# Patient Record
Sex: FEMALE | Race: WHITE | NOT HISPANIC OR LATINO | Employment: PART TIME | URBAN - METROPOLITAN AREA
[De-identification: names, ages, dates, MRNs, and addresses within clinical notes are randomized per-mention and may not be internally consistent; named-entity substitution may affect disease eponyms.]

---

## 2017-02-01 ENCOUNTER — TELEPHONE (OUTPATIENT)
Dept: INTERNAL MEDICINE | Facility: MEDICAL CENTER | Age: 62
End: 2017-02-01

## 2017-02-01 NOTE — TELEPHONE ENCOUNTER
Refilled prozac. Patient has elavil listed in her med list (from external pharmacy). I called patient and left voicemail to make sure she is not taking both prozac and elavil together.   Can you also please call and make sure she not taking both meds together. Thanks.

## 2017-02-07 RX ORDER — OMEGA-3/DHA/EPA/FISH OIL 35-113.5MG
TABLET,CHEWABLE ORAL
Qty: 60 TAB | Refills: 0 | Status: SHIPPED | OUTPATIENT
Start: 2017-02-07 | End: 2017-03-07

## 2017-02-07 NOTE — TELEPHONE ENCOUNTER
Last seen: 11/29/16 by Dr. Black  Next appt: None     Was the patient seen in the last year in this department? Yes   Does patient have an active prescription for medications requested? No   Received Request Via: Pharmacy

## 2017-03-07 ENCOUNTER — OFFICE VISIT (OUTPATIENT)
Dept: INTERNAL MEDICINE | Facility: MEDICAL CENTER | Age: 62
End: 2017-03-07
Payer: MEDICAID

## 2017-03-07 VITALS
HEIGHT: 64 IN | BODY MASS INDEX: 24.59 KG/M2 | HEART RATE: 58 BPM | WEIGHT: 144 LBS | RESPIRATION RATE: 16 BRPM | OXYGEN SATURATION: 99 % | SYSTOLIC BLOOD PRESSURE: 128 MMHG | DIASTOLIC BLOOD PRESSURE: 80 MMHG | TEMPERATURE: 97.9 F

## 2017-03-07 DIAGNOSIS — Z79.899 MEDICATION MANAGEMENT: ICD-10-CM

## 2017-03-07 DIAGNOSIS — E11.8 UNCONTROLLED TYPE 2 DIABETES MELLITUS WITH COMPLICATION, UNSPECIFIED LONG TERM INSULIN USE STATUS: ICD-10-CM

## 2017-03-07 DIAGNOSIS — E11.65 UNCONTROLLED TYPE 2 DIABETES MELLITUS WITH COMPLICATION, UNSPECIFIED LONG TERM INSULIN USE STATUS: ICD-10-CM

## 2017-03-07 DIAGNOSIS — Z72.0 TOBACCO USE: ICD-10-CM

## 2017-03-07 DIAGNOSIS — F41.9 ANXIETY: ICD-10-CM

## 2017-03-07 PROCEDURE — 99214 OFFICE O/P EST MOD 30 MIN: CPT | Mod: GC | Performed by: INTERNAL MEDICINE

## 2017-03-07 NOTE — PATIENT INSTRUCTIONS
Diabetes and Exercise  Exercising regularly is important. It is not just about losing weight. It has many health benefits, such as:  · Improving your overall fitness, flexibility, and endurance.  · Increasing your bone density.  · Helping with weight control.  · Decreasing your body fat.  · Increasing your muscle strength.  · Reducing stress and tension.  · Improving your overall health.  People with diabetes who exercise gain additional benefits because exercise:  · Reduces appetite.  · Improves the body's use of blood sugar (glucose).  · Helps lower or control blood glucose.  · Decreases blood pressure.  · Helps control blood lipids (such as cholesterol and triglycerides).  · Improves the body's use of the hormone insulin by:  · Increasing the body's insulin sensitivity.  · Reducing the body's insulin needs.  · Decreases the risk for heart disease because exercising:  · Lowers cholesterol and triglycerides levels.  · Increases the levels of good cholesterol (such as high-density lipoproteins [HDL]) in the body.  · Lowers blood glucose levels.  YOUR ACTIVITY PLAN   Choose an activity that you enjoy, and set realistic goals. To exercise safely, you should begin practicing any new physical activity slowly, and gradually increase the intensity of the exercise over time. Your health care provider or diabetes educator can help create an activity plan that works for you. General recommendations include:  · Encouraging children to engage in at least 60 minutes of physical activity each day.  · Stretching and performing strength training exercises, such as yoga or weight lifting, at least 2 times per week.  · Performing a total of at least 150 minutes of moderate-intensity exercise each week, such as brisk walking or water aerobics.  · Exercising at least 3 days per week, making sure you allow no more than 2 consecutive days to pass without exercising.  · Avoiding long periods of inactivity (90 minutes or more). When you  have to spend an extended period of time sitting down, take frequent breaks to walk or stretch.  RECOMMENDATIONS FOR EXERCISING WITH TYPE 1 OR TYPE 2 DIABETES   · Check your blood glucose before exercising. If blood glucose levels are greater than 240 mg/dL, check for urine ketones. Do not exercise if ketones are present.  · Avoid injecting insulin into areas of the body that are going to be exercised. For example, avoid injecting insulin into:  ¨ The arms when playing tennis.  ¨ The legs when jogging.  · Keep a record of:  ¨ Food intake before and after you exercise.  ¨ Expected peak times of insulin action.  ¨ Blood glucose levels before and after you exercise.  ¨ The type and amount of exercise you have done.  · Review your records with your health care provider. Your health care provider will help you to develop guidelines for adjusting food intake and insulin amounts before and after exercising.  · If you take insulin or oral hypoglycemic agents, watch for signs and symptoms of hypoglycemia. They include:  ¨ Dizziness.  ¨ Shaking.  ¨ Sweating.  ¨ Chills.  ¨ Confusion.  · Drink plenty of water while you exercise to prevent dehydration or heat stroke. Body water is lost during exercise and must be replaced.  · Talk to your health care provider before starting an exercise program to make sure it is safe for you. Remember, almost any type of activity is better than none.     This information is not intended to replace advice given to you by your health care provider. Make sure you discuss any questions you have with your health care provider.     Document Released: 03/09/2005 Document Revised: 05/03/2016 Document Reviewed: 05/27/2014  [employer] Interactive Patient Education ©2016 [employer]  Hypoglycemia  Hypoglycemia occurs when the glucose in your blood is too low. Glucose is a type of sugar that is your body's main energy source. Hormones, such as insulin and glucagon, control the level of glucose in the  blood. Insulin lowers blood glucose and glucagon increases blood glucose. Having too much insulin in your blood stream, or not eating enough food containing sugar, can result in hypoglycemia. Hypoglycemia can happen to people with or without diabetes. It can develop quickly and can be a medical emergency.   CAUSES   · Missing or delaying meals.  · Not eating enough carbohydrates at meals.  · Taking too much diabetes medicine.  · Not timing your oral diabetes medicine or insulin doses with meals, snacks, and exercise.  · Nausea and vomiting.  · Certain medicines.  · Severe illnesses, such as hepatitis, kidney disorders, and certain eating disorders.  · Increased activity or exercise without eating something extra or adjusting medicines.  · Drinking too much alcohol.  · A nerve disorder that affects body functions like your heart rate, blood pressure, and digestion (autonomic neuropathy).  · A condition where the stomach muscles do not function properly (gastroparesis). Therefore, medicines and food may not absorb properly.  · Rarely, a tumor of the pancreas can produce too much insulin.  SYMPTOMS   · Hunger.  · Sweating (diaphoresis).  · Change in body temperature.  · Shakiness.  · Headache.  · Anxiety.  · Lightheadedness.  · Irritability.  · Difficulty concentrating.  · Dry mouth.  · Tingling or numbness in the hands or feet.  · Restless sleep or sleep disturbances.  · Altered speech and coordination.  · Change in mental status.  · Seizures or prolonged convulsions.  · Combativeness.  · Drowsiness (lethargic).  · Weakness.  · Increased heart rate or palpitations.  · Confusion.  · Pale, gray skin color.  · Blurred or double vision.  · Fainting.  DIAGNOSIS   A physical exam and medical history will be performed. Your caregiver may make a diagnosis based on your symptoms. Blood tests and other lab tests may be performed to confirm a diagnosis. Once the diagnosis is made, your caregiver will see if your signs and  symptoms go away once your blood glucose is raised.   TREATMENT   Usually, you can easily treat your hypoglycemia when you notice symptoms.  · Check your blood glucose. If it is less than 70 mg/dl, take one of the following:    ¨ 3-4 glucose tablets.    ¨ ½ cup juice.    ¨ ½ cup regular soda.    ¨ 1 cup skim milk.    ¨ ½-1 tube of glucose gel.    ¨ 5-6 hard candies.    · Avoid high-fat drinks or food that may delay a rise in blood glucose levels.  · Do not take more than the recommended amount of sugary foods, drinks, gel, or tablets. Doing so will cause your blood glucose to go too high.    · Wait 10-15 minutes and recheck your blood glucose. If it is still less than 70 mg/dl or below your target range, repeat treatment.    · Eat a snack if it is more than 1 hour until your next meal.    There may be a time when your blood glucose may go so low that you are unable to treat yourself at home when you start to notice symptoms. You may need someone to help you. You may even faint or be unable to swallow. If you cannot treat yourself, someone will need to bring you to the hospital.   HOME CARE INSTRUCTIONS  · If you have diabetes, follow your diabetes management plan by:  ¨ Taking your medicines as directed.  ¨ Following your exercise plan.  ¨ Following your meal plan. Do not skip meals. Eat on time.  ¨ Testing your blood glucose regularly. Check your blood glucose before and after exercise. If you exercise longer or different than usual, be sure to check blood glucose more frequently.  ¨ Wearing your medical alert jewelry that says you have diabetes.  · Identify the cause of your hypoglycemia. Then, develop ways to prevent the recurrence of hypoglycemia.  · Do not take a hot bath or shower right after an insulin shot.  · Always carry treatment with you. Glucose tablets are the easiest to carry.  · If you are going to drink alcohol, drink it only with meals.  · Tell friends or family members ways to keep you safe during  a seizure. This may include removing hard or sharp objects from the area or turning you on your side.  · Maintain a healthy weight.  SEEK MEDICAL CARE IF:   · You are having problems keeping your blood glucose in your target range.  · You are having frequent episodes of hypoglycemia.  · You feel you might be having side effects from your medicines.  · You are not sure why your blood glucose is dropping so low.  · You notice a change in vision or a new problem with your vision.  SEEK IMMEDIATE MEDICAL CARE IF:   · Confusion develops.  · A change in mental status occurs.  · The inability to swallow develops.  · Fainting occurs.     This information is not intended to replace advice given to you by your health care provider. Make sure you discuss any questions you have with your health care provider.     Document Released: 12/18/2006 Document Revised: 12/23/2014 Document Reviewed: 04/15/2013  [employer] Interactive Patient Education ©2016 [employer]

## 2017-03-07 NOTE — PROGRESS NOTES
Established Patient    Mary presents today with the following:    CC: Follow-up    HPI: 61-year-old female here for regular follow-up.    Diabetes mellitus type 2: She is currently on metformin 1000 twice a day, Lantus 20 in the evening and glipizide 5 mg once a day. Her A1c in September was 7. She did not get repeat A1c. Also did not get her microalbumin to creatinine ratio. Patient is still having episodes of dizziness and confusion. She is not checking her blood sugars. She states that the meter and the strips are not working together and her insurance is not going to cover for another glucometer.    Tobacco abuse: She is down to 5 cigarettes now from 10 cigarettes.     Depression/anxiety: Patient is taking only Elavil. She confirms that she is not taking Prozac in addition.    Has maintenance: States she has an upcoming appointment for mammogram as well as colonoscopy which is scheduled on 28 March.      Patient Active Problem List    Diagnosis Date Noted   • Screening mammogram, encounter for 11/29/2016   • Callus of right foot 11/29/2016   • Essential hypertension, benign 11/14/2016   • Chest discomfort 11/14/2016   • Coronary artery disease, non-occlusive 11/14/2016   • Unstable angina (CMS-HCC) 08/18/2016   • Tobacco use 08/18/2016   • Near syncope 08/18/2016   • History of lupus 08/18/2016   • Vitamin B12 deficiency 08/15/2016   • Multiple pulmonary nodules 06/23/2016   • Fibromyalgia 06/23/2016   • GERD (gastroesophageal reflux disease) 06/23/2016   • Dyslipidemia 06/23/2016   • Insomnia 06/23/2016   • Anxiety 06/23/2016   • Vitamin D deficiency 06/23/2016   • Diabetes type 2, uncontrolled (CMS-[facility]) 09/21/2010       Current Outpatient Prescriptions   Medication Sig Dispense Refill   • [facility] VITAMIN B12 1000 MCG Tab TAKE 2 TABLETS BY MOUTH EVERY DAY. 60 Tab 0   • fluoxetine (PROZAC) 10 MG Cap TAKE 1 CAPSULE BY MOUTH DAILY 30 Cap 3   • atorvastatin (LIPITOR) 40 MG Tab Take 1 Tab by mouth every day. 30  "Tab 5   • cyanocobalamin (VITAMIN B12) 1000 MCG Tab Take 2 Tabs by mouth every day. 30 Tab 6   • trazodone (DESYREL) 50 MG Tab Take 1 Tab by mouth every bedtime. TAKE 1 TABLET BY MOUTH DAILY 30 Tab 3   • metoprolol (LOPRESSOR) 25 MG Tab Take 0.5 Tabs by mouth 2 times a day. 120 Tab 3   • glipiZIDE (GLUCOTROL) 5 MG Tab TAKE 1 TABLET BY MOUTH 2 TIMES A DAY. 60 Tab 5   • Blood Glucose Monitoring Suppl (TRUE METRIX METER) W/DEVICE Kit 1 Each by Does not apply route 2 times a day. True Metrix 1 Kit 10   • glucose blood strip 1 Strip by Other route 2 Times a Day. True Metrix brand 100 Strip 6   • cyanocobalamin (VITAMIN B-12) 1000 MCG/ML Solution 1 mL by Intramuscular route every 30 days. 1 Vial 2   • metformin (GLUCOPHAGE) 1000 MG tablet Take 1 Tab by mouth 2 times a day, with meals. 60 Tab 6   • gabapentin (NEURONTIN) 100 MG Cap Take 1 Cap by mouth 3 times a day. 90 Cap 3   • amitriptyline (ELAVIL) 25 MG Tab Take 25 mg by mouth.  1   • Lancets Misc Lancets order: Lancets for glucometer. Sig: use BID and prn ssx high or low sugar. #100 RF x 3 100 Each 6   • lisinopril (PRINIVIL) 20 MG Tab Take 0.5 Tabs by mouth every day. 30 Tab 6   • insulin glargine (LANTUS) 100 UNIT/ML Solution Pen-injector injection Inject 20 Units as instructed every evening. 3 PEN 6   • glipiZIDE (GLUCOTROL) 5 MG Tab Take 1 Tab by mouth 2 times a day. 60 Tab 3   • Blood Glucose Monitoring Suppl (TRUERESULT BLOOD GLUCOSE) W/DEVICE Kit Use as directed 1 Kit 0   • Insulin Pen Needle 31G X 8 MM Misc Use as directed 100 Each 6   • pantoprazole (PROTONIX) 40 MG Tablet Delayed Response Take 40 mg by mouth every day.     • Lancet Devices (TRUEDRAW LANCING DEVICE) Misc by Does not apply route.     • aspirin 81 MG tablet Take 81 mg by mouth every morning. Starting 9/26        No current facility-administered medications for this visit.       ROS: As per HPI.     /80 mmHg  Pulse 58  Temp(Src) 36.6 °C (97.9 °F)  Resp 16  Ht 1.626 m (5' 4.02\")  Wt " 65.318 kg (144 lb)  BMI 24.71 kg/m2  SpO2 99%    Physical Exam   Constitutional:  oriented to person, place, and time. No distress.   Eyes: Pupils are equal, round, and reactive to light. No scleral icterus.  Neck: Neck supple. No thyromegaly present.   Cardiovascular: Normal rate, regular rhythm and normal heart sounds.  Exam reveals no gallop and no friction rub.  No murmur heard.  Pulmonary/Chest: Breath sounds normal. Chest wall is not dull to percussion.   Musculoskeletal:   no edema.   Lymphadenopathy: no cervical adenopathy  Neurological: alert and oriented to person, place, and time.   Skin: No cyanosis. Nails show no clubbing.      Assessment and Plan    1. Uncontrolled type 2 diabetes mellitus with complication, unspecified long term insulin use status (CMS-[facility])  Currently on metformin thousand twice a day, Lantus 20 at bedtime, glipizide 5 mg.  A1c 6.6. As we previously thought patient is overly controlled on her diabetes and is having hypoglycemic episodes contributing to dizziness and confusion. She has previously been worked up for chest pain/dizziness and has had Holter monitoring and cardiac catheterization done which was pretty much normal. She was diagnosed with B12 deficiency and has been taking supplements but is still having episodes of dizziness and confusion.  We will completely stop glipizide at this point in time and decrease metformin to 1000 daily from twice a day.  We will repeat A1c in 3 months and see how she's doing.  Reordered microalbumin to creatinine ratio as patient did not get it done last time  She does not want to see a podiatrist.    2. Tobacco use  She is down to 5 cigarettes now from 10 cigarettes. Congratulated about cutting down on smoking    3. Anxiety  Patient confirms that she is not on both Prozac and Elavil together and is only taking Elavil.      4. Health maintenance  States that she is going to have the mammogram done soon. Also stated that she has appointment  for colonoscopy on the 28th of this month. Willing to get a Pap smear next visit. Patient was asked to schedule a Pap smear visit. Instructed her to get a DTaP vaccine from her pharmacy.          Followup: Return in about 5 weeks (around 4/11/2017).      Signed by: Darwin Jasmine M.D.

## 2017-03-07 NOTE — MR AVS SNAPSHOT
"Mary Gibbsbe   3/7/2017 8:30 AM   Office Visit   MRN: [id number]    Department:  [facility] Med - Internal Med   Dept Phone:  [phone]    Description:  Female : 1955   Provider:  Darwin Jasmine M.D.           Reason for Visit     Dizziness states still not getting better      Allergies as of 3/7/2017     No Known Allergies      You were diagnosed with     Uncontrolled type 2 diabetes mellitus with complication, unspecified long term insulin use status (CMS-HCC)   [3355111]       Tobacco use   [632679]       Anxiety   [165185]         Vital Signs     Blood Pressure Pulse Temperature Respirations Height Weight    128/80 mmHg 58 36.6 °C (97.9 °F) 16 1.626 m (5' 4.02\") 65.318 kg (144 lb)    Body Mass Index Oxygen Saturation Smoking Status             24.71 kg/m2 99% Current Every Day Smoker         Basic Information     Date Of Birth Sex Race Ethnicity Preferred Language    1955 Female White Non- English      Your appointments     2017  9:30 AM   ANNUAL EXAM PREVENTATIVE with Darwin Jasmine M.D.   [facility] - Internal Medicine (--)    [address]  [address]  [facility] [id number] [phone]            May 30, 2017  2:30 PM   FOLLOW UP with Beck Man M.D.   [facility] (--)    [address], [address]  [facility] [id number] [phone]              Problem List              ICD-10-CM Priority Class Noted - Resolved    Diabetes type 2, uncontrolled (CMS-HCC) E11.65   2010 - Present    Multiple pulmonary nodules R91.8   2016 - Present    Fibromyalgia M79.7   2016 - Present    GERD (gastroesophageal reflux disease) K21.9   2016 - Present    Dyslipidemia E78.5   2016 - Present    Insomnia G47.00   2016 - Present    Anxiety F41.9   2016 - Present    Vitamin D deficiency E55.9   2016 - Present    Vitamin B12 deficiency E53.8   8/15/2016 - Present    Unstable angina " (CMS-[facility]) I20.0   8/18/2016 - Present    Tobacco use Z72.0   8/18/2016 - Present    Near syncope R55   8/18/2016 - Present    History of lupus Z87.39   8/18/2016 - Present    Essential hypertension, benign I10   11/14/2016 - Present    Chest discomfort R07.89   11/14/2016 - Present    Coronary artery disease, non-occlusive I25.10   11/14/2016 - Present    Screening mammogram, encounter for Z12.31   11/29/2016 - Present    Callus of right foot L84   11/29/2016 - Present      Health Maintenance        Date Due Completion Dates    IMM HEP B VACCINE (1 of 3 - Primary Series) 1955 ---    DIABETES MONOFILAMENT / LE EXAM 3/23/1957 ---    URINE ACR / MICROALBUMIN 9/23/1974 ---    IMM DTaP/Tdap/Td Vaccine (1 - Tdap) 9/23/1975 ---    PAP SMEAR 9/23/1977 ---    IMM ZOSTER VACCINE 9/23/2016 ---    IMM PNEUMOCOCCAL 19-64 (ADULT) MEDIUM RISK SERIES (1 of 1 - PPSV23) 1/24/2017 11/29/2016    MAMMOGRAM 2/1/2017 2/1/2016, 5/6/2013    A1C SCREENING 3/12/2017 9/12/2016, 7/2/2016, 9/21/2010, 5/8/2009    RETINAL SCREENING 6/23/2017 6/23/2016    SERUM CREATININE 8/25/2017 8/25/2016, 7/2/2016, 3/8/2016, 3/2/2016, 1/21/2016, 1/14/2016, 8/26/2013, 9/22/2010, 9/21/2010, 9/20/2010, 5/8/2009    FASTING LIPID PROFILE 11/28/2017 11/28/2016, 9/21/2010, 5/8/2009    COLONOSCOPY 2/1/2027 2/1/2017 (N/S)    Override on 2/1/2017: (N/S) (PER [facility] AND [facility] NO COLONOSCOPY)            Current Immunizations     13-VALENT PCV PREVNAR 11/29/2016    Influenza Vaccine Quad Inj (Preserved) 11/22/2016, 10/9/2014      Below and/or attached are the medications your provider expects you to take. Review all of your home medications and newly ordered medications with your provider and/or pharmacist. Follow medication instructions as directed by your provider and/or pharmacist. Please keep your medication list with you and share with your provider. Update the information when medications are discontinued, doses are changed, or new medications (including  over-the-counter products) are added; and carry medication information at all times in the event of emergency situations     Allergies:  No Known Allergies          Medications  Valid as of: March 07, 2017 -  9:07 AM    Generic Name Brand Name Tablet Size Instructions for use    Amitriptyline HCl (Tab) ELAVIL 25 MG Take 25 mg by mouth.        Aspirin (Tab) aspirin 81 MG Take 81 mg by mouth every morning. Starting 9/26         Atorvastatin Calcium (Tab) LIPITOR 40 MG Take 1 Tab by mouth every day.        Blood Glucose Monitoring Suppl (Kit) TRUERESULT BLOOD GLUCOSE W/DEVICE Use as directed        Blood Glucose Monitoring Suppl (Kit) TRUE METRIX METER W/DEVICE 1 Each by Does not apply route 2 times a day. True Metrix        Cyanocobalamin (Tab) VITAMIN B12 1000 MCG Take 2 Tabs by mouth every day.        FLUoxetine HCl (Cap) PROZAC 10 MG TAKE 1 CAPSULE BY MOUTH DAILY        Gabapentin (Cap) NEURONTIN 100 MG Take 1 Cap by mouth 3 times a day.        GlipiZIDE (Tab) GLUCOTROL 5 MG Take 1 Tab by mouth 2 times a day.        Glucose Blood (Strip) glucose blood  1 Strip by Other route 2 Times a Day. True Metrix brand        Insulin Glargine (Solution Pen-injector) LANTUS 100 UNIT/ML Inject 20 Units as instructed every evening.        Insulin Pen Needle (Misc) Insulin Pen Needle 31G X 8 MM Use as directed        Lancet Devices (Misc) TRUEDRAW LANCING DEVICE  by Does not apply route.        Lancets (Misc) Lancets  Lancets order: Lancets for glucometer. Sig: use BID and prn ssx high or low sugar. #100 RF x 3        Lisinopril (Tab) PRINIVIL 20 MG Take 0.5 Tabs by mouth every day.        MetFORMIN HCl (Tab) GLUCOPHAGE 1000 MG Take 1 Tab by mouth 2 times a day, with meals.        Metoprolol Tartrate (Tab) LOPRESSOR 25 MG Take 0.5 Tabs by mouth 2 times a day.        Pantoprazole Sodium (Tablet Delayed Response) PROTONIX 40 MG Take 40 mg by mouth every day.        TraZODone HCl (Tab) DESYREL 50 MG Take 1 Tab by mouth every bedtime.  TAKE 1 TABLET BY MOUTH DAILY        .                 Medicines prescribed today were sent to:     [facility] #[id number] - [place], [place] - [address] [facility]    [address] [place] [address] [place] [address]    Phone: [phone] Fax: [phone]    Open 24 Hours?: No      Medication refill instructions:       If your prescription bottle indicates you have medication refills left, it is not necessary to call your provider’s office. Please contact your pharmacy and they will refill your medication.    If your prescription bottle indicates you do not have any refills left, you may request refills at any time through one of the following ways: The online [employer] system (except Urgent Care), by calling your provider’s office, or by asking your pharmacy to contact your provider’s office with a refill request. Medication refills are processed only during regular business hours and may not be available until the next business day. Your provider may request additional information or to have a follow-up visit with you prior to refilling your medication.   *Please Note: Medication refills are assigned a new Rx number when refilled electronically. Your pharmacy may indicate that no refills were authorized even though a new prescription for the same medication is available at the pharmacy. Please request the medicine by name with the pharmacy before contacting your provider for a refill.        Your To Do List     Future Labs/Procedures Complete By Expires    MICROALBUMIN CREAT RATIO URINE  As directed 3/7/2018      Instructions    Diabetes and Exercise  Exercising regularly is important. It is not just about losing weight. It has many health benefits, such as:  · Improving your overall fitness, flexibility, and endurance.  · Increasing your bone density.  · Helping with weight control.  · Decreasing your body fat.  · Increasing your muscle strength.  · Reducing stress and tension.  · Improving your overall health.  People with diabetes who exercise  gain additional benefits because exercise:  · Reduces appetite.  · Improves the body's use of blood sugar (glucose).  · Helps lower or control blood glucose.  · Decreases blood pressure.  · Helps control blood lipids (such as cholesterol and triglycerides).  · Improves the body's use of the hormone insulin by:  · Increasing the body's insulin sensitivity.  · Reducing the body's insulin needs.  · Decreases the risk for heart disease because exercising:  · Lowers cholesterol and triglycerides levels.  · Increases the levels of good cholesterol (such as high-density lipoproteins [HDL]) in the body.  · Lowers blood glucose levels.  YOUR ACTIVITY PLAN   Choose an activity that you enjoy, and set realistic goals. To exercise safely, you should begin practicing any new physical activity slowly, and gradually increase the intensity of the exercise over time. Your health care provider or diabetes educator can help create an activity plan that works for you. General recommendations include:  · Encouraging children to engage in at least 60 minutes of physical activity each day.  · Stretching and performing strength training exercises, such as yoga or weight lifting, at least 2 times per week.  · Performing a total of at least 150 minutes of moderate-intensity exercise each week, such as brisk walking or water aerobics.  · Exercising at least 3 days per week, making sure you allow no more than 2 consecutive days to pass without exercising.  · Avoiding long periods of inactivity (90 minutes or more). When you have to spend an extended period of time sitting down, take frequent breaks to walk or stretch.  RECOMMENDATIONS FOR EXERCISING WITH TYPE 1 OR TYPE 2 DIABETES   · Check your blood glucose before exercising. If blood glucose levels are greater than 240 mg/dL, check for urine ketones. Do not exercise if ketones are present.  · Avoid injecting insulin into areas of the body that are going to be exercised. For example, avoid  injecting insulin into:  ¨ The arms when playing tennis.  ¨ The legs when jogging.  · Keep a record of:  ¨ Food intake before and after you exercise.  ¨ Expected peak times of insulin action.  ¨ Blood glucose levels before and after you exercise.  ¨ The type and amount of exercise you have done.  · Review your records with your health care provider. Your health care provider will help you to develop guidelines for adjusting food intake and insulin amounts before and after exercising.  · If you take insulin or oral hypoglycemic agents, watch for signs and symptoms of hypoglycemia. They include:  ¨ Dizziness.  ¨ Shaking.  ¨ Sweating.  ¨ Chills.  ¨ Confusion.  · Drink plenty of water while you exercise to prevent dehydration or heat stroke. Body water is lost during exercise and must be replaced.  · Talk to your health care provider before starting an exercise program to make sure it is safe for you. Remember, almost any type of activity is better than none.     This information is not intended to replace advice given to you by your health care provider. Make sure you discuss any questions you have with your health care provider.     Document Released: 03/09/2005 Document Revised: 05/03/2016 Document Reviewed: 05/27/2014  [employer] Patient Education ©2016 [employer]  Hypoglycemia  Hypoglycemia occurs when the glucose in your blood is too low. Glucose is a type of sugar that is your body's main energy source. Hormones, such as insulin and glucagon, control the level of glucose in the blood. Insulin lowers blood glucose and glucagon increases blood glucose. Having too much insulin in your blood stream, or not eating enough food containing sugar, can result in hypoglycemia. Hypoglycemia can happen to people with or without diabetes. It can develop quickly and can be a medical emergency.   CAUSES   · Missing or delaying meals.  · Not eating enough carbohydrates at meals.  · Taking too much diabetes  medicine.  · Not timing your oral diabetes medicine or insulin doses with meals, snacks, and exercise.  · Nausea and vomiting.  · Certain medicines.  · Severe illnesses, such as hepatitis, kidney disorders, and certain eating disorders.  · Increased activity or exercise without eating something extra or adjusting medicines.  · Drinking too much alcohol.  · A nerve disorder that affects body functions like your heart rate, blood pressure, and digestion (autonomic neuropathy).  · A condition where the stomach muscles do not function properly (gastroparesis). Therefore, medicines and food may not absorb properly.  · Rarely, a tumor of the pancreas can produce too much insulin.  SYMPTOMS   · Hunger.  · Sweating (diaphoresis).  · Change in body temperature.  · Shakiness.  · Headache.  · Anxiety.  · Lightheadedness.  · Irritability.  · Difficulty concentrating.  · Dry mouth.  · Tingling or numbness in the hands or feet.  · Restless sleep or sleep disturbances.  · Altered speech and coordination.  · Change in mental status.  · Seizures or prolonged convulsions.  · Combativeness.  · Drowsiness (lethargic).  · Weakness.  · Increased heart rate or palpitations.  · Confusion.  · Pale, gray skin color.  · Blurred or double vision.  · Fainting.  DIAGNOSIS   A physical exam and medical history will be performed. Your caregiver may make a diagnosis based on your symptoms. Blood tests and other lab tests may be performed to confirm a diagnosis. Once the diagnosis is made, your caregiver will see if your signs and symptoms go away once your blood glucose is raised.   TREATMENT   Usually, you can easily treat your hypoglycemia when you notice symptoms.  · Check your blood glucose. If it is less than 70 mg/dl, take one of the following:    ¨ 3-4 glucose tablets.    ¨ ½ cup juice.    ¨ ½ cup regular soda.    ¨ 1 cup skim milk.    ¨ ½-1 tube of glucose gel.    ¨ 5-6 hard candies.    · Avoid high-fat drinks or food that may delay a rise  in blood glucose levels.  · Do not take more than the recommended amount of sugary foods, drinks, gel, or tablets. Doing so will cause your blood glucose to go too high.    · Wait 10-15 minutes and recheck your blood glucose. If it is still less than 70 mg/dl or below your target range, repeat treatment.    · Eat a snack if it is more than 1 hour until your next meal.    There may be a time when your blood glucose may go so low that you are unable to treat yourself at home when you start to notice symptoms. You may need someone to help you. You may even faint or be unable to swallow. If you cannot treat yourself, someone will need to bring you to the hospital.   HOME CARE INSTRUCTIONS  · If you have diabetes, follow your diabetes management plan by:  ¨ Taking your medicines as directed.  ¨ Following your exercise plan.  ¨ Following your meal plan. Do not skip meals. Eat on time.  ¨ Testing your blood glucose regularly. Check your blood glucose before and after exercise. If you exercise longer or different than usual, be sure to check blood glucose more frequently.  ¨ Wearing your medical alert jewelry that says you have diabetes.  · Identify the cause of your hypoglycemia. Then, develop ways to prevent the recurrence of hypoglycemia.  · Do not take a hot bath or shower right after an insulin shot.  · Always carry treatment with you. Glucose tablets are the easiest to carry.  · If you are going to drink alcohol, drink it only with meals.  · Tell friends or family members ways to keep you safe during a seizure. This may include removing hard or sharp objects from the area or turning you on your side.  · Maintain a healthy weight.  SEEK MEDICAL CARE IF:   · You are having problems keeping your blood glucose in your target range.  · You are having frequent episodes of hypoglycemia.  · You feel you might be having side effects from your medicines.  · You are not sure why your blood glucose is dropping so low.  · You  notice a change in vision or a new problem with your vision.  SEEK IMMEDIATE MEDICAL CARE IF:   · Confusion develops.  · A change in mental status occurs.  · The inability to swallow develops.  · Fainting occurs.     This information is not intended to replace advice given to you by your health care provider. Make sure you discuss any questions you have with your health care provider.     Document Released: 12/18/2006 Document Revised: 12/23/2014 Document Reviewed: 04/15/2013  [employer] Interactive Patient Education ©2016 [employer]            MyChart Status: Patient Declined

## 2017-03-11 ENCOUNTER — HOSPITAL ENCOUNTER (OUTPATIENT)
Facility: MEDICAL CENTER | Age: 62
End: 2017-03-11
Attending: GENERAL ACUTE CARE HOSPITAL
Payer: MEDICAID

## 2017-03-11 DIAGNOSIS — E11.65 UNCONTROLLED TYPE 2 DIABETES MELLITUS WITH COMPLICATION, UNSPECIFIED LONG TERM INSULIN USE STATUS: ICD-10-CM

## 2017-03-11 DIAGNOSIS — E11.8 UNCONTROLLED TYPE 2 DIABETES MELLITUS WITH COMPLICATION, UNSPECIFIED LONG TERM INSULIN USE STATUS: ICD-10-CM

## 2017-03-11 LAB
CREAT UR-MCNC: 98.1 MG/DL
MICROALBUMIN UR-MCNC: 3.5 MG/DL
MICROALBUMIN/CREAT UR: 36 MG/G (ref 0–30)

## 2017-03-11 PROCEDURE — 82570 ASSAY OF URINE CREATININE: CPT

## 2017-03-11 PROCEDURE — 82043 UR ALBUMIN QUANTITATIVE: CPT

## 2017-03-14 DIAGNOSIS — E53.8 VITAMIN B12 DEFICIENCY: ICD-10-CM

## 2017-03-15 NOTE — TELEPHONE ENCOUNTER
Last seen: 3/7/17 by Dr. Jasmine  Next appt: 4/11/17 with Dr. Jasmine    Was the patient seen in the last year in this department? Yes   Does patient have an active prescription for medications requested? No   Received Request Via: Pharmacy

## 2017-04-11 ENCOUNTER — HOSPITAL ENCOUNTER (OUTPATIENT)
Facility: MEDICAL CENTER | Age: 62
End: 2017-04-11
Attending: GENERAL ACUTE CARE HOSPITAL
Payer: MEDICAID

## 2017-04-11 ENCOUNTER — OFFICE VISIT (OUTPATIENT)
Dept: INTERNAL MEDICINE | Facility: MEDICAL CENTER | Age: 62
End: 2017-04-11
Payer: MEDICAID

## 2017-04-11 VITALS
TEMPERATURE: 96.6 F | DIASTOLIC BLOOD PRESSURE: 72 MMHG | HEIGHT: 64 IN | SYSTOLIC BLOOD PRESSURE: 104 MMHG | WEIGHT: 147.13 LBS | BODY MASS INDEX: 25.12 KG/M2 | OXYGEN SATURATION: 95 % | HEART RATE: 61 BPM

## 2017-04-11 DIAGNOSIS — Z01.419 PAP SMEAR, AS PART OF ROUTINE GYNECOLOGICAL EXAMINATION: ICD-10-CM

## 2017-04-11 PROCEDURE — 87624 HPV HI-RISK TYP POOLED RSLT: CPT

## 2017-04-11 PROCEDURE — 88175 CYTOPATH C/V AUTO FLUID REDO: CPT

## 2017-04-11 PROCEDURE — 99396 PREV VISIT EST AGE 40-64: CPT | Mod: GC | Performed by: INTERNAL MEDICINE

## 2017-04-11 PROCEDURE — 99000 SPECIMEN HANDLING OFFICE-LAB: CPT | Performed by: INTERNAL MEDICINE

## 2017-04-11 NOTE — PATIENT INSTRUCTIONS
Pap Test  A Pap test checks the cells on the surface of your cervix. Your doctor will look for cell changes that are not normal, an infection, or cancer. If the cells no longer look normal, it is called dysplasia. Dysplasia can turn into cancer. Regular Pap tests are important to stop cancer from developing.  BEFORE THE PROCEDURE  · Ask your doctor when to schedule your Pap test. Timing the test around your period may be important.  · Do not douche or have sex (intercourse) for 24 hours before the test.  · Do not put creams on your vagina or use tampons for 24 hours before the test.  · Go pee (urinate) just before the test.  PROCEDURE  · You will lie on an exam table with your feet in stirrups.  · A warm metal or plastic tool (speculum) will be put in your vagina to open it up.  · Your doctor will use a small, plastic brush or wooden spatula to take cells from your cervix.  · The cells will be put in a lab container.  · The cells will be checked under a microscope to see if they are normal or not.  AFTER THE PROCEDURE  Get your test results. If they are abnormal, you may need more tests.  Document Released: 01/20/2012 Document Revised: 03/11/2013 Document Reviewed: 12/13/2012  [employer]® Patient Information ©2014 [employer].

## 2017-04-11 NOTE — MR AVS SNAPSHOT
"Mary Gibbsbe   2017 9:30 AM   Office Visit   MRN: [id number]    Department:  [facility] - Internal Med   Dept Phone:  [phone]    Description:  Female : 1955   Provider:  Darwin Jasmine M.D.           Reason for Visit     Follow-Up     Gynecologic Exam           Allergies as of 2017     No Known Allergies      You were diagnosed with     Pap smear, as part of routine gynecological examination   [[id number]]         Vital Signs     Blood Pressure Pulse Temperature Height Weight Body Mass Index    104/72 mmHg 61 35.9 °C (96.6 °F) 1.626 m (5' 4.02\") 66.735 kg (147 lb 2 oz) 25.24 kg/m2    Oxygen Saturation Smoking Status                95% Current Every Day Smoker          Basic Information     Date Of Birth Sex Race Ethnicity Preferred Language    1955 Female White Non- English      Your appointments     May 16, 2017 10:30 AM   Established Patient with Darwin Jasmine M.D.   [facility] - Internal Medicine (--)    [address] [place] [address]  [address]  [facility] [address] [phone]           You will be receiving a confirmation call a few days before your appointment from our automated call confirmation system.            May 30, 2017  2:30 PM   FOLLOW UP with Beck Man M.D.   [facility] (--)    [address] [place] [address]  [facility] [address] [phone]              Problem List              ICD-10-CM Priority Class Noted - Resolved    Diabetes type 2, uncontrolled (CMS-HCC) E11.65   2010 - Present    Multiple pulmonary nodules R91.8   2016 - Present    Fibromyalgia M79.7   2016 - Present    GERD (gastroesophageal reflux disease) K21.9   2016 - Present    Dyslipidemia E78.5   2016 - Present    Insomnia G47.00   2016 - Present    Anxiety F41.9   2016 - Present    Vitamin D deficiency E55.9   2016 - Present    Vitamin B12 deficiency E53.8   8/15/2016 - Present    Unstable " angina (CMS-[facility]) I20.0   8/18/2016 - Present    Tobacco use Z72.0   8/18/2016 - Present    Near syncope R55   8/18/2016 - Present    History of lupus Z87.39   8/18/2016 - Present    Essential hypertension, benign I10   11/14/2016 - Present    Chest discomfort R07.89   11/14/2016 - Present    Coronary artery disease, non-occlusive I25.10   11/14/2016 - Present    Screening mammogram, encounter for Z12.31   11/29/2016 - Present    Callus of right foot L84   11/29/2016 - Present      Health Maintenance        Date Due Completion Dates    IMM HEP B VACCINE (1 of 3 - Primary Series) 1955 ---    DIABETES MONOFILAMENT / LE EXAM 3/23/1957 ---    IMM DTaP/Tdap/Td Vaccine (1 - Tdap) 9/23/1975 ---    PAP SMEAR 9/23/1977 ---    IMM ZOSTER VACCINE 9/23/2016 ---    IMM PNEUMOCOCCAL 19-64 (ADULT) MEDIUM RISK SERIES (1 of 1 - PPSV23) 1/24/2017 11/29/2016    MAMMOGRAM 2/1/2017 2/1/2016, 5/6/2013    A1C SCREENING 3/12/2017 9/12/2016, 7/2/2016, 9/21/2010, 5/8/2009    RETINAL SCREENING 6/23/2017 6/23/2016    SERUM CREATININE 8/25/2017 8/25/2016, 7/2/2016, 3/8/2016, 3/2/2016, 1/21/2016, 1/14/2016, 8/26/2013, 9/22/2010, 9/21/2010, 9/20/2010, 5/8/2009    FASTING LIPID PROFILE 11/28/2017 11/28/2016, 9/21/2010, 5/8/2009    URINE ACR / MICROALBUMIN 3/11/2018 3/11/2017    COLONOSCOPY 2/1/2027 2/1/2017 (N/S)    Override on 2/1/2017: (N/S) (PER [facility]IC AND C NO COLONOSCOPY)            Current Immunizations     13-VALENT PCV PREVNAR 11/29/2016    Influenza Vaccine Quad Inj (Preserved) 11/22/2016, 10/9/2014      Below and/or attached are the medications your provider expects you to take. Review all of your home medications and newly ordered medications with your provider and/or pharmacist. Follow medication instructions as directed by your provider and/or pharmacist. Please keep your medication list with you and share with your provider. Update the information when medications are discontinued, doses are changed, or new medications (including  over-the-counter products) are added; and carry medication information at all times in the event of emergency situations     Allergies:  No Known Allergies          Medications  Valid as of: April 11, 2017 -  9:59 AM    Generic Name Brand Name Tablet Size Instructions for use    Amitriptyline HCl (Tab) ELAVIL 25 MG Take 25 mg by mouth.        Aspirin (Tab) aspirin 81 MG Take 81 mg by mouth every morning. Starting 9/26         Atorvastatin Calcium (Tab) LIPITOR 40 MG Take 1 Tab by mouth every day.        Blood Glucose Monitoring Suppl (Kit) TRUERESULT BLOOD GLUCOSE W/DEVICE Use as directed        Blood Glucose Monitoring Suppl (Kit) TRUE METRIX METER W/DEVICE 1 Each by Does not apply route 2 times a day. True Metrix        Cyanocobalamin (Tab) VITAMIN B12 1000 MCG Take 2 Tabs by mouth every day.        FLUoxetine HCl (Cap) PROZAC 10 MG TAKE 1 CAPSULE BY MOUTH DAILY        Gabapentin (Cap) NEURONTIN 100 MG Take 1 Cap by mouth 3 times a day.        Glucose Blood (Strip) glucose blood  1 Strip by Other route 2 Times a Day. True Metrix brand        Insulin Glargine (Solution Pen-injector) LANTUS 100 UNIT/ML Inject 20 Units as instructed every evening.        Insulin Glargine (Solution Pen-injector) LANTUS SOLOSTAR 100 UNIT/ML INJECT 20 UNITS SUBCOUTANEOUSLY AS INSTRUCTED EVERY EVENING.        Insulin Pen Needle (Misc) Insulin Pen Needle 31G X 8 MM Use as directed        Lancet Devices (Misc) TRUEDRAW LANCING DEVICE  by Does not apply route.        Lancets (Misc) Lancets  Lancets order: Lancets for glucometer. Sig: use BID and prn ssx high or low sugar. #100 RF x 3        Lisinopril (Tab) PRINIVIL 20 MG Take 0.5 Tabs by mouth every day.        MetFORMIN HCl (Tab) GLUCOPHAGE 1000 MG Take 1 Tab by mouth every day.        Metoprolol Tartrate (Tab) LOPRESSOR 25 MG Take 0.5 Tabs by mouth 2 times a day.        Pantoprazole Sodium (Tablet Delayed Response) PROTONIX 40 MG Take 40 mg by mouth every day.        TraZODone HCl (Tab)  DESYREL 50 MG Take 1 Tab by mouth every bedtime. TAKE 1 TABLET BY MOUTH DAILY        .                 Medicines prescribed today were sent to:     [facility] #[id number] - [place], [place] - [address] [facility]    [address]  [place] [address]    Phone: [phone] Fax: [phone]    Open 24 Hours?: No      Medication refill instructions:       If your prescription bottle indicates you have medication refills left, it is not necessary to call your provider’s office. Please contact your pharmacy and they will refill your medication.    If your prescription bottle indicates you do not have any refills left, you may request refills at any time through one of the following ways: The online Eponym system (except Urgent Care), by calling your provider’s office, or by asking your pharmacy to contact your provider’s office with a refill request. Medication refills are processed only during regular business hours and may not be available until the next business day. Your provider may request additional information or to have a follow-up visit with you prior to refilling your medication.   *Please Note: Medication refills are assigned a new Rx number when refilled electronically. Your pharmacy may indicate that no refills were authorized even though a new prescription for the same medication is available at the pharmacy. Please request the medicine by name with the pharmacy before contacting your provider for a refill.        Your To Do List     Future Labs/Procedures Complete By Expires    THINPREP PAP,REFLEX HPV ON ASC-US ONLY  As directed 4/11/2018         Eponym Status: Patient Declined        Quit Tobacco Information     Do you want to quit using tobacco?    Quitting tobacco decreases risks of cancer, heart and lung disease, increases life expectancy, improves sense of taste and smell, and increases spending money, among other benefits.    If you are thinking about quitting, we can help.  • [facility] Quit Tobacco Program:  [phone]  o Program occurs weekly for four weeks and includes pharmacist consultation on products to support quitting smoking or chewing tobacco. A provider referral is needed for pharmacist consultation.  • Tobacco Users Help Hotline: 8-800QUIT-NOW (702-3425) or https://[place].quitlogix.org/  o Free, confidential telephone and online coaching for [place] residents. Sessions are designed on a schedule that is convenient for you. Eligible clients receive free nicotine replacement therapy.  • Nationally: www.smokefree.gov  o Information and professional assistance to support both immediate and long-term needs as you become, and remain, a non-smoker. Smokefree.gov allows you to choose the help that best fits your needs.

## 2017-04-11 NOTE — PROGRESS NOTES
Established Patient    Mary presents today with the following:    CC: Pap smear    HPI: 61 year old F here for routine pap smear. Last pap smear few years ago-normal per patient. No history of uterine or cervical diseases.    Patient Active Problem List    Diagnosis Date Noted   • Screening mammogram, encounter for 11/29/2016   • Callus of right foot 11/29/2016   • Essential hypertension, benign 11/14/2016   • Chest discomfort 11/14/2016   • Coronary artery disease, non-occlusive 11/14/2016   • Unstable angina (CMS-HCC) 08/18/2016   • Tobacco use 08/18/2016   • Near syncope 08/18/2016   • History of lupus 08/18/2016   • Vitamin B12 deficiency 08/15/2016   • Multiple pulmonary nodules 06/23/2016   • Fibromyalgia 06/23/2016   • GERD (gastroesophageal reflux disease) 06/23/2016   • Dyslipidemia 06/23/2016   • Insomnia 06/23/2016   • Anxiety 06/23/2016   • Vitamin D deficiency 06/23/2016   • Diabetes type 2, uncontrolled (CMS-HCC) 09/21/2010       Current Outpatient Prescriptions   Medication Sig Dispense Refill   • LANTUS SOLOSTAR 100 UNIT/ML Solution Pen-injector injection INJECT 20 UNITS SUBCOUTANEOUSLY AS INSTRUCTED EVERY EVENING. 15 PEN 0   • cyanocobalamin (VITAMIN B12) 1000 MCG Tab Take 2 Tabs by mouth every day. 60 Tab 6   • metformin (GLUCOPHAGE) 1000 MG tablet Take 1 Tab by mouth every day. 30 Tab 6   • fluoxetine (PROZAC) 10 MG Cap TAKE 1 CAPSULE BY MOUTH DAILY 30 Cap 3   • atorvastatin (LIPITOR) 40 MG Tab Take 1 Tab by mouth every day. 30 Tab 5   • trazodone (DESYREL) 50 MG Tab Take 1 Tab by mouth every bedtime. TAKE 1 TABLET BY MOUTH DAILY 30 Tab 3   • metoprolol (LOPRESSOR) 25 MG Tab Take 0.5 Tabs by mouth 2 times a day. 120 Tab 3   • Blood Glucose Monitoring Suppl (TRUE METRIX METER) W/DEVICE Kit 1 Each by Does not apply route 2 times a day. True Metrix 1 Kit 10   • glucose blood strip 1 Strip by Other route 2 Times a Day. True Metrix brand 100 Strip 6   • gabapentin (NEURONTIN) 100 MG Cap Take 1  "Cap by mouth 3 times a day. 90 Cap 3   • amitriptyline (ELAVIL) 25 MG Tab Take 25 mg by mouth.  1   • Lancets Misc Lancets order: Lancets for glucometer. Sig: use BID and prn ssx high or low sugar. #100 RF x 3 100 Each 6   • lisinopril (PRINIVIL) 20 MG Tab Take 0.5 Tabs by mouth every day. 30 Tab 6   • insulin glargine (LANTUS) 100 UNIT/ML Solution Pen-injector injection Inject 20 Units as instructed every evening. 3 PEN 6   • Blood Glucose Monitoring Suppl (bluepulseULT BLOOD GLUCOSE) W/DEVICE Kit Use as directed 1 Kit 0   • Insulin Pen Needle 31G X 8 MM Misc Use as directed 100 Each 6   • pantoprazole (PROTONIX) 40 MG Tablet Delayed Response Take 40 mg by mouth every day.     • Lancet Devices (TRUEDRAW LANCING DEVICE) Misc by Does not apply route.     • aspirin 81 MG tablet Take 81 mg by mouth every morning. Starting 9/26        No current facility-administered medications for this visit.       ROS: As per HPI.     /72 mmHg  Pulse 61  Temp(Src) 35.9 °C (96.6 °F)  Ht 1.626 m (5' 4.02\")  Wt 66.735 kg (147 lb 2 oz)  BMI 25.24 kg/m2  SpO2 95%    Physical Exam   Constitutional:  oriented to person, place, and time. No distress.   Cardiovascular: Normal rate, regular rhythm and normal heart sounds.  Exam reveals no gallop and no friction rub.  No murmur heard.  Pulmonary/Chest: Breath sounds normal. Chest wall is not dull to percussion.       Assessment and Plan    1. Pap smear, as part of routine gynecological examination  Pap smear performed by myself under supervision of Dr. Marin.  Results to be communicated to patient.      Followup: Return in about 5 weeks (around 5/16/2017).      Signed by: Darwin Jasmine M.D.  "

## 2017-04-12 LAB
CYTOLOGY REG CYTOL: NORMAL
HPV HR 12 DNA CVX QL NAA+PROBE: NEGATIVE
HPV16 DNA SPEC QL NAA+PROBE: NEGATIVE
HPV18 DNA SPEC QL NAA+PROBE: NEGATIVE
SPECIMEN SOURCE: NORMAL

## 2017-05-11 NOTE — TELEPHONE ENCOUNTER
Was the patient seen in the last year in this department? Yes    Last seen: 04/11/17 by Dr. Jasmine  Next appt: 05/16/17 with Dr. Jasmine      Does patient have an active prescription for medications requested? No        Received Request Via: Pharmacy

## 2017-05-13 RX ORDER — LISINOPRIL 20 MG/1
TABLET ORAL
Qty: 30 TAB | Refills: 2 | Status: SHIPPED | OUTPATIENT
Start: 2017-05-13 | End: 2017-05-18

## 2017-05-16 ENCOUNTER — APPOINTMENT (OUTPATIENT)
Dept: INTERNAL MEDICINE | Facility: MEDICAL CENTER | Age: 62
End: 2017-05-16
Payer: MEDICAID

## 2017-05-18 ENCOUNTER — OFFICE VISIT (OUTPATIENT)
Dept: INTERNAL MEDICINE | Facility: MEDICAL CENTER | Age: 62
End: 2017-05-18
Payer: MEDICAID

## 2017-05-18 VITALS
TEMPERATURE: 97.3 F | HEART RATE: 80 BPM | SYSTOLIC BLOOD PRESSURE: 120 MMHG | BODY MASS INDEX: 25.44 KG/M2 | RESPIRATION RATE: 18 BRPM | OXYGEN SATURATION: 96 % | DIASTOLIC BLOOD PRESSURE: 76 MMHG | HEIGHT: 64 IN | WEIGHT: 149 LBS

## 2017-05-18 DIAGNOSIS — I10 ESSENTIAL HYPERTENSION, BENIGN: ICD-10-CM

## 2017-05-18 DIAGNOSIS — K21.9 GASTROESOPHAGEAL REFLUX DISEASE WITHOUT ESOPHAGITIS: ICD-10-CM

## 2017-05-18 DIAGNOSIS — E53.8 VITAMIN B12 DEFICIENCY: ICD-10-CM

## 2017-05-18 DIAGNOSIS — E78.5 DYSLIPIDEMIA: ICD-10-CM

## 2017-05-18 DIAGNOSIS — E11.8 UNCONTROLLED TYPE 2 DIABETES MELLITUS WITH COMPLICATION, UNSPECIFIED LONG TERM INSULIN USE STATUS: ICD-10-CM

## 2017-05-18 DIAGNOSIS — F41.9 ANXIETY: ICD-10-CM

## 2017-05-18 DIAGNOSIS — M79.7 FIBROMYALGIA: ICD-10-CM

## 2017-05-18 DIAGNOSIS — E11.65 UNCONTROLLED TYPE 2 DIABETES MELLITUS WITH COMPLICATION, UNSPECIFIED LONG TERM INSULIN USE STATUS: ICD-10-CM

## 2017-05-18 DIAGNOSIS — I25.10 CORONARY ARTERY DISEASE, NON-OCCLUSIVE: ICD-10-CM

## 2017-05-18 DIAGNOSIS — Z72.0 TOBACCO USE: ICD-10-CM

## 2017-05-18 PROCEDURE — 99213 OFFICE O/P EST LOW 20 MIN: CPT | Mod: GE | Performed by: INTERNAL MEDICINE

## 2017-05-18 RX ORDER — PANTOPRAZOLE SODIUM 40 MG/1
40 TABLET, DELAYED RELEASE ORAL DAILY
Qty: 30 TAB | Refills: 1 | Status: SHIPPED | OUTPATIENT
Start: 2017-05-18 | End: 2017-06-22

## 2017-05-18 ASSESSMENT — PAIN SCALES - GENERAL: PAINLEVEL: NO PAIN

## 2017-05-18 ASSESSMENT — PATIENT HEALTH QUESTIONNAIRE - PHQ9: CLINICAL INTERPRETATION OF PHQ2 SCORE: 2

## 2017-05-18 NOTE — PROGRESS NOTES
Established Patient    Mary presents today with the following:    CC: Follow-up    HPI: 61-year-old female here for follow-up.    Diabetic neuropathy/diabetes mellitus type 2: Patient states that her neuropathy is getting worse. She noted that her neuropathy is quite better when she started taking B12 vitamins and is concerned that she may not be absorbing oral B12 supplementation. She is requesting vitamin B-12 levels to be checked. She is supposed to be on gabapentin for neuropathy but is only taking it as needed because that makes her drowsy. She is on metformin 1000 once a day as well as Lantus 20 diabetes. Her last A1c was 6.6.     Tobacco abuse: Patient is currently smoking 4 cigarettes.     Anxiety/depression: Patient previously stated that she is taking Elavil and not Prozac. However, today she stated that she is doing it the other way around. When asked how she takes the medicine she stated that she takes it on an as needed basis. Also noted that she is not feeling depressed or anxious anymore and does not mind going off of it.    GERD: Requesting for refills on Protonix. States that her symptoms are well controlled on this medication.    Hyperlipidemia: Taking statins does not have any muscle aches or pains.    Fibromyalgia: States that she was previously seeing a rheumatologist and would like to get a referral.    Patient Active Problem List    Diagnosis Date Noted   • Screening mammogram, encounter for 11/29/2016   • Callus of right foot 11/29/2016   • Essential hypertension, benign 11/14/2016   • Chest discomfort 11/14/2016   • Coronary artery disease, non-occlusive 11/14/2016   • Unstable angina (CMS-HCC) 08/18/2016   • Tobacco use 08/18/2016   • Near syncope 08/18/2016   • History of lupus 08/18/2016   • Vitamin B12 deficiency 08/15/2016   • Multiple pulmonary nodules 06/23/2016   • Fibromyalgia 06/23/2016   • GERD (gastroesophageal reflux disease) 06/23/2016   • Dyslipidemia 06/23/2016   •  "Insomnia 06/23/2016   • Anxiety 06/23/2016   • Vitamin D deficiency 06/23/2016   • Diabetes type 2, uncontrolled (CMS-[facility]) 09/21/2010       Current Outpatient Prescriptions   Medication Sig Dispense Refill   • lisinopril (PRINIVIL) 20 MG Tab TAKE 1/2 TABLETS BY MOUTH EVERY DAY. 30 Tab 2   • trazodone (DESYREL) 50 MG Tab Take 1 Tab by mouth every bedtime. 30 Tab 0   • LANTUS SOLOSTAR 100 UNIT/ML Solution Pen-injector injection INJECT 20 UNITS SUBCOUTANEOUSLY AS INSTRUCTED EVERY EVENING. 15 PEN 0   • cyanocobalamin (VITAMIN B12) 1000 MCG Tab Take 2 Tabs by mouth every day. 60 Tab 6   • metformin (GLUCOPHAGE) 1000 MG tablet Take 1 Tab by mouth every day. 30 Tab 6   • fluoxetine (PROZAC) 10 MG Cap TAKE 1 CAPSULE BY MOUTH DAILY 30 Cap 3   • atorvastatin (LIPITOR) 40 MG Tab Take 1 Tab by mouth every day. 30 Tab 5   • metoprolol (LOPRESSOR) 25 MG Tab Take 0.5 Tabs by mouth 2 times a day. 120 Tab 3   • gabapentin (NEURONTIN) 100 MG Cap Take 1 Cap by mouth 3 times a day. 90 Cap 3   • amitriptyline (ELAVIL) 25 MG Tab Take 25 mg by mouth.  1   • lisinopril (PRINIVIL) 20 MG Tab Take 0.5 Tabs by mouth every day. 30 Tab 6   • insulin glargine (LANTUS) 100 UNIT/ML Solution Pen-injector injection Inject 20 Units as instructed every evening. 3 PEN 6   • Insulin Pen Needle 31G X 8 MM Misc Use as directed 100 Each 6   • pantoprazole (PROTONIX) 40 MG Tablet Delayed Response Take 40 mg by mouth every day.     • Lancet Devices (TRUEDRAW LANCING DEVICE) Misc by Does not apply route.     • aspirin 81 MG tablet Take 81 mg by mouth every morning. Starting 9/26      • Lancets Misc Lancets order: Lancets for glucometer. Sig: use BID and prn ssx high or low sugar. #100 RF x 3 100 Each 6     No current facility-administered medications for this visit.       ROS: As per HPI.     /76 mmHg  Pulse 80  Temp(Src) 36.3 °C (97.3 °F)  Resp 18  Ht 1.626 m (5' 4.02\")  Wt 67.586 kg (149 lb)  BMI 25.56 kg/m2  SpO2 96%  Breastfeeding? " No    Physical Exam   Constitutional:  oriented to person, place, and time. No distress.   Eyes: Pupils are equal, round, and reactive to light. No scleral icterus.  Neck: Neck supple. No thyromegaly present.   Cardiovascular: Normal rate, regular rhythm and normal heart sounds.  Exam reveals no gallop and no friction rub.  No murmur heard.  Pulmonary/Chest: Breath sounds normal. Chest wall is not dull to percussion.   Musculoskeletal:   no edema.   Lymphadenopathy: no cervical adenopathy  Neurological: alert and oriented to person, place, and time.   Skin: No cyanosis. Nails show no clubbing.      Assessment and Plan    1. Vitamin B12 deficiency  Patient wants her Vitamin B12 to be checked.  States that it helped with her neuropathy initially but now she is having worsening. She is curious what her levels are.  - VITAMIN B12; Future    2. Tobacco use  She is down to 4 cigarettes now (last visit 5 cigarettes)  Encouraged to quit smoking completely    3. Gastroesophageal reflux disease without esophagitis  Discussed the long term effects of PPI. Encouraged weaning herself off of it.    4. Essential hypertension, benign  Well controlled. Continue lisinopril and metoprolol    5. Uncontrolled type 2 diabetes mellitus with complication, unspecified long term insulin use status (CMS-[facility])  Diabetic neuropathy  Last A1c 6.6. Currently on lantus 20 U and metformin 1000 QD.  Will check A1c next visit.  Discussed with patient that we can try duloxetine instead of gabapentin for neuropathy but she needs to be off of fluoxetine first. Patient stated that she is only taking fluoxetine very rarely (see below). When she sees me next visit will prescribe duloxetine but first want to make sure she is off of fluoxetine completely.    6. Dyslipidemia  Continue atorvastatin.    7. Anxiety  Previously the patient told us that she is taking Elavil. However, today she is telling me that she is not taking Elavil but is taking Prozac.  Also  she stated that Prozac is only on and as needed basis and not daily   Discussed with patient that prozac should be taken every day and not when necessary. Patient stated that she does not feel depressed or anxious and would like to go off of it. Instructed to wean herself off of Prozac.    9. Fibromyalgia  States that she was previously seeing Dr. Delvalle. Previously we had put in a referral but she never heard from rheumatology. She is requesting a rereferral.  - REFERRAL TO RHEUMATOLOGY      Followup: 5 weeks    Signed by: Darwin Jasmine M.D.

## 2017-05-18 NOTE — MR AVS SNAPSHOT
"Mary Gibbsbe   2017 2:45 PM   Office Visit   MRN: [id number]    Department:  [facility] Med - Internal Med   Dept Phone:  [phone]    Description:  Female : 1955   Provider:  Darwin Jasmine M.D.           Reason for Visit     Diabetes dm 2    Medication Refill protonix    Gastrophageal Reflux anxiety      Allergies as of 2017     No Known Allergies      You were diagnosed with     Vitamin B12 deficiency   [965182]       Tobacco use   [446044]       Gastroesophageal reflux disease without esophagitis   [956445]       Essential hypertension, benign   [401.1.ICD-9-CM]       Uncontrolled type 2 diabetes mellitus with complication, unspecified long term insulin use status (CMS-[facility])   [1008604]       Dyslipidemia   [176467]       Coronary artery disease, non-occlusive   [024068]       Anxiety   [727678]       Fibromyalgia   [263374]         Vital Signs     Blood Pressure Pulse Temperature Respirations Height Weight    120/76 mmHg 80 36.3 °C (97.3 °F) 18 1.626 m (5' 4.02\") 67.586 kg (149 lb)    Body Mass Index Oxygen Saturation Breastfeeding? Smoking Status          25.56 kg/m2 96% No Current Every Day Smoker        Basic Information     Date Of Birth Sex Race Ethnicity Preferred Language    1955 Female White Non- English      Your appointments     May 30, 2017  2:30 PM   FOLLOW UP with Beck Man M.D.   [facility] (--)    [address] [place] [address] [address]  [facility] [place] [address] [phone]            2017  3:15 PM   [facility] (--)    [address]  [address]  [facility] [address] [phone]           You will be receiving a confirmation call a few days before your appointment from our automated call confirmation system.              Problem List              ICD-10-CM Priority Class Noted - Resolved    Diabetes type 2, uncontrolled " (CMS-HCC) E11.65   9/21/2010 - Present    Multiple pulmonary nodules R91.8   6/23/2016 - Present    Fibromyalgia M79.7   6/23/2016 - Present    GERD (gastroesophageal reflux disease) K21.9   6/23/2016 - Present    Dyslipidemia E78.5   6/23/2016 - Present    Insomnia G47.00   6/23/2016 - Present    Anxiety F41.9   6/23/2016 - Present    Vitamin D deficiency E55.9   6/23/2016 - Present    Vitamin B12 deficiency E53.8   8/15/2016 - Present    Tobacco use Z72.0   8/18/2016 - Present    History of lupus Z87.39   8/18/2016 - Present    Essential hypertension, benign I10   11/14/2016 - Present    Coronary artery disease, non-occlusive I25.10   11/14/2016 - Present    Screening mammogram, encounter for Z12.31   11/29/2016 - Present      Health Maintenance        Date Due Completion Dates    IMM HEP B VACCINE (1 of 3 - Primary Series) 1955 ---    DIABETES MONOFILAMENT / LE EXAM 3/23/1957 ---    IMM DTaP/Tdap/Td Vaccine (1 - Tdap) 9/23/1975 ---    IMM ZOSTER VACCINE 9/23/2016 ---    IMM PNEUMOCOCCAL 19-64 (ADULT) MEDIUM RISK SERIES (1 of 1 - PPSV23) 1/24/2017 11/29/2016    MAMMOGRAM 2/1/2017 2/1/2016, 5/6/2013    A1C SCREENING 3/12/2017 9/12/2016, 7/2/2016, 9/21/2010, 5/8/2009    RETINAL SCREENING 6/23/2017 6/23/2016    SERUM CREATININE 8/25/2017 8/25/2016, 7/2/2016, 3/8/2016, 3/2/2016, 1/21/2016, 1/14/2016, 8/26/2013, 9/22/2010, 9/21/2010, 9/20/2010, 5/8/2009    FASTING LIPID PROFILE 11/28/2017 11/28/2016, 9/21/2010, 5/8/2009    URINE ACR / MICROALBUMIN 3/11/2018 3/11/2017    PAP SMEAR 4/11/2020 4/11/2017    COLONOSCOPY 2/1/2027 2/1/2017 (N/S)    Override on 2/1/2017: (N/S) (PER [facility] AND C NO COLONOSCOPY)            Current Immunizations     13-VALENT PCV PREVNAR 11/29/2016    Influenza Vaccine Quad Inj (Preserved) 11/22/2016, 10/9/2014      Below and/or attached are the medications your provider expects you to take. Review all of your home medications and newly ordered medications with your provider and/or pharmacist.  Follow medication instructions as directed by your provider and/or pharmacist. Please keep your medication list with you and share with your provider. Update the information when medications are discontinued, doses are changed, or new medications (including over-the-counter products) are added; and carry medication information at all times in the event of emergency situations     Allergies:  No Known Allergies          Medications  Valid as of: May 18, 2017 -  3:08 PM    Generic Name Brand Name Tablet Size Instructions for use    Aspirin (Tab) aspirin 81 MG Take 81 mg by mouth every morning. Starting 9/26         Atorvastatin Calcium (Tab) LIPITOR 40 MG Take 1 Tab by mouth every day.        Cyanocobalamin (Tab) VITAMIN B12 1000 MCG Take 2 Tabs by mouth every day.        FLUoxetine HCl (Cap) PROZAC 10 MG TAKE 1 CAPSULE BY MOUTH DAILY        Gabapentin (Cap) NEURONTIN 100 MG Take 1 Cap by mouth 3 times a day.        Insulin Glargine (Solution Pen-injector) LANTUS 100 UNIT/ML Inject 20 Units as instructed every evening.        Insulin Glargine (Solution Pen-injector) LANTUS SOLOSTAR 100 UNIT/ML INJECT 20 UNITS SUBCOUTANEOUSLY AS INSTRUCTED EVERY EVENING.        Insulin Pen Needle (Misc) Insulin Pen Needle 31G X 8 MM Use as directed        Lancet Devices (Misc) TRUEDRAW LANCING DEVICE  by Does not apply route.        Lisinopril (Tab) PRINIVIL 20 MG Take 0.5 Tabs by mouth every day.        MetFORMIN HCl (Tab) GLUCOPHAGE 1000 MG Take 1 Tab by mouth every day.        Metoprolol Tartrate (Tab) LOPRESSOR 25 MG Take 0.5 Tabs by mouth 2 times a day.        Pantoprazole Sodium (Tablet Delayed Response) PROTONIX 40 MG Take 1 Tab by mouth every day.        TraZODone HCl (Tab) DESYREL 50 MG Take 1 Tab by mouth every bedtime.        .                 Medicines prescribed today were sent to:     [facility]/PHARMACY #[id number] - [place], [place] - [address]    [address] [place] [address]    Phone: [phone] Fax: [phone]    Open 24  Hours?: No      Medication refill instructions:       If your prescription bottle indicates you have medication refills left, it is not necessary to call your provider’s office. Please contact your pharmacy and they will refill your medication.    If your prescription bottle indicates you do not have any refills left, you may request refills at any time through one of the following ways: The online [employer] system (except Urgent Care), by calling your provider’s office, or by asking your pharmacy to contact your provider’s office with a refill request. Medication refills are processed only during regular business hours and may not be available until the next business day. Your provider may request additional information or to have a follow-up visit with you prior to refilling your medication.   *Please Note: Medication refills are assigned a new Rx number when refilled electronically. Your pharmacy may indicate that no refills were authorized even though a new prescription for the same medication is available at the pharmacy. Please request the medicine by name with the pharmacy before contacting your provider for a refill.        Your To Do List     Future Labs/Procedures Complete By Expires    VITAMIN B12  As directed 5/18/2018      Referral     A referral request has been sent to our patient care coordination department. Please allow 3-5 business days for us to process this request and contact you either by phone or mail. If you do not hear from us by the 5th business day, please call us at [phone].        Instructions    Smoking Hazards  Smoking cigarettes is extremely bad for your health. Tobacco smoke has over 200 known poisons in it. It contains the poisonous gases nitrogen oxide and carbon monoxide. There are over 60 chemicals in tobacco smoke that cause cancer. Some of the chemicals found in cigarette smoke include:   · Cyanide.    · Benzene.    · Formaldehyde.    · Methanol (wood alcohol).     · Acetylene (fuel used in welding torches).    · Ammonia.    Even smoking lightly shortens your life expectancy by several years. You can greatly reduce the risk of medical problems for you and your family by stopping now. Smoking is the most preventable cause of death and disease in our society. Within days of quitting smoking, your circulation improves, you decrease the risk of having a heart attack, and your lung capacity improves. There may be some increased phlegm in the first few days after quitting, and it may take months for your lungs to clear up completely. Quitting for 10 years reduces your risk of developing lung cancer to almost that of a nonsmoker.   WHAT ARE THE RISKS OF SMOKING?  Cigarette smokers have an increased risk of many serious medical problems, including:  · Lung cancer.    · Lung disease (such as pneumonia, bronchitis, and emphysema).    · Heart attack and chest pain due to the heart not getting enough oxygen (angina).    · Heart disease and peripheral blood vessel disease.    · Hypertension.    · Stroke.    · Oral cancer (cancer of the lip, mouth, or voice box).    · Bladder cancer.    · Pancreatic cancer.    · Cervical cancer.    · Pregnancy complications, including premature birth.    · Stillbirths and smaller  babies, birth defects, and genetic damage to sperm.    · Early menopause.    · Lower estrogen level for women.    · Infertility.    · Facial wrinkles.    · Blindness.    · Increased risk of broken bones (fractures).    · Senile dementia.    · Stomach ulcers and internal bleeding.    · Delayed wound healing and increased risk of complications during surgery.  Because of secondhand smoke exposure, children of smokers have an increased risk of the following:   · Sudden infant death syndrome (SIDS).    · Respiratory infections.    · Lung cancer.    · Heart disease.    · Ear infections.    WHY IS SMOKING ADDICTIVE?  Nicotine is the chemical agent in tobacco that is capable of  causing addiction or dependence. When you smoke and inhale, nicotine is absorbed rapidly into the bloodstream through your lungs. Both inhaled and noninhaled nicotine may be addictive.   WHAT ARE THE BENEFITS OF QUITTING?   There are many health benefits to quitting smoking. Some are:   · The likelihood of developing cancer and heart disease decreases. Health improvements are seen almost immediately.    · Blood pressure, pulse rate, and breathing patterns start returning to normal soon after quitting.    · People who quit may see an improvement in their overall quality of life.    HOW DO YOU QUIT SMOKING?  Smoking is an addiction with both physical and psychological effects, and longtime habits can be hard to change. Your health care provider can recommend:  · Programs and community resources, which may include group support, education, or therapy.  · Replacement products, such as patches, gum, and nasal sprays. Use these products only as directed. Do not replace cigarette smoking with electronic cigarettes (commonly called e-cigarettes). The safety of e-cigarettes is unknown, and some may contain harmful chemicals.  FOR MORE INFORMATION  · American Lung Association: www.lung.org  · American Cancer Society: www.cancer.org     This information is not intended to replace advice given to you by your health care provider. Make sure you discuss any questions you have with your health care provider.     Document Released: 01/25/2006 Document Revised: 10/08/2014 Document Reviewed: 06/09/2014  [employer] Interactive Patient Education ©2016 [employer]            MyChart Status: Patient Declined        Quit Tobacco Information     Do you want to quit using tobacco?    Quitting tobacco decreases risks of cancer, heart and lung disease, increases life expectancy, improves sense of taste and smell, and increases spending money, among other benefits.    If you are thinking about quitting, we can help.  • [facility] Quit Tobacco  Program: 986.468.5652  o Program occurs weekly for four weeks and includes pharmacist consultation on products to support quitting smoking or chewing tobacco. A provider referral is needed for pharmacist consultation.  • Tobacco Users Help Hotline: 800-QUIT-NOW (404-7714) or https://[place].quitlogix.org/  o Free, confidential telephone and online coaching for [place] residents. Sessions are designed on a schedule that is convenient for you. Eligible clients receive free nicotine replacement therapy.  • Nationally: www.smokefree.gov  o Information and professional assistance to support both immediate and long-term needs as you become, and remain, a non-smoker. Smokefree.gov allows you to choose the help that best fits your needs.

## 2017-05-18 NOTE — PATIENT INSTRUCTIONS
Smoking Hazards  Smoking cigarettes is extremely bad for your health. Tobacco smoke has over 200 known poisons in it. It contains the poisonous gases nitrogen oxide and carbon monoxide. There are over 60 chemicals in tobacco smoke that cause cancer. Some of the chemicals found in cigarette smoke include:   · Cyanide.    · Benzene.    · Formaldehyde.    · Methanol (wood alcohol).    · Acetylene (fuel used in welding torches).    · Ammonia.    Even smoking lightly shortens your life expectancy by several years. You can greatly reduce the risk of medical problems for you and your family by stopping now. Smoking is the most preventable cause of death and disease in our society. Within days of quitting smoking, your circulation improves, you decrease the risk of having a heart attack, and your lung capacity improves. There may be some increased phlegm in the first few days after quitting, and it may take months for your lungs to clear up completely. Quitting for 10 years reduces your risk of developing lung cancer to almost that of a nonsmoker.   WHAT ARE THE RISKS OF SMOKING?  Cigarette smokers have an increased risk of many serious medical problems, including:  · Lung cancer.    · Lung disease (such as pneumonia, bronchitis, and emphysema).    · Heart attack and chest pain due to the heart not getting enough oxygen (angina).    · Heart disease and peripheral blood vessel disease.    · Hypertension.    · Stroke.    · Oral cancer (cancer of the lip, mouth, or voice box).    · Bladder cancer.    · Pancreatic cancer.    · Cervical cancer.    · Pregnancy complications, including premature birth.    · Stillbirths and smaller  babies, birth defects, and genetic damage to sperm.    · Early menopause.    · Lower estrogen level for women.    · Infertility.    · Facial wrinkles.    · Blindness.    · Increased risk of broken bones (fractures).    · Senile dementia.    · Stomach ulcers and internal bleeding.    · Delayed  wound healing and increased risk of complications during surgery.  Because of secondhand smoke exposure, children of smokers have an increased risk of the following:   · Sudden infant death syndrome (SIDS).    · Respiratory infections.    · Lung cancer.    · Heart disease.    · Ear infections.    WHY IS SMOKING ADDICTIVE?  Nicotine is the chemical agent in tobacco that is capable of causing addiction or dependence. When you smoke and inhale, nicotine is absorbed rapidly into the bloodstream through your lungs. Both inhaled and noninhaled nicotine may be addictive.   WHAT ARE THE BENEFITS OF QUITTING?   There are many health benefits to quitting smoking. Some are:   · The likelihood of developing cancer and heart disease decreases. Health improvements are seen almost immediately.    · Blood pressure, pulse rate, and breathing patterns start returning to normal soon after quitting.    · People who quit may see an improvement in their overall quality of life.    HOW DO YOU QUIT SMOKING?  Smoking is an addiction with both physical and psychological effects, and longtime habits can be hard to change. Your health care provider can recommend:  · Programs and community resources, which may include group support, education, or therapy.  · Replacement products, such as patches, gum, and nasal sprays. Use these products only as directed. Do not replace cigarette smoking with electronic cigarettes (commonly called e-cigarettes). The safety of e-cigarettes is unknown, and some may contain harmful chemicals.  FOR MORE INFORMATION  · American Lung Association: www.lung.org  · American Cancer Society: www.cancer.org     This information is not intended to replace advice given to you by your health care provider. Make sure you discuss any questions you have with your health care provider.     Document Released: 01/25/2006 Document Revised: 10/08/2014 Document Reviewed: 06/09/2014  Elsevier Interactive Patient Education ©2016  [employer] Inc.

## 2017-05-30 NOTE — TELEPHONE ENCOUNTER
Was the patient seen in the last year in this department? Yes    Last seen: 05/18/17 by Dr. Jasmine  Next appt: 06/22/17 with Dr. Jasmine      Does patient have an active prescription for medications requested? No     Received Request Via: Pharmacy

## 2017-05-31 RX ORDER — FLUOXETINE 10 MG/1
CAPSULE ORAL
Qty: 30 CAP | Refills: 3 | Status: SHIPPED | OUTPATIENT
Start: 2017-05-31 | End: 2017-06-22

## 2017-06-02 ENCOUNTER — OFFICE VISIT (OUTPATIENT)
Dept: CARDIOLOGY | Facility: MEDICAL CENTER | Age: 62
End: 2017-06-02
Payer: MEDICAID

## 2017-06-02 VITALS
SYSTOLIC BLOOD PRESSURE: 130 MMHG | BODY MASS INDEX: 25.27 KG/M2 | HEART RATE: 56 BPM | WEIGHT: 148 LBS | DIASTOLIC BLOOD PRESSURE: 70 MMHG | HEIGHT: 64 IN | OXYGEN SATURATION: 95 %

## 2017-06-02 DIAGNOSIS — I10 ESSENTIAL HYPERTENSION, BENIGN: ICD-10-CM

## 2017-06-02 DIAGNOSIS — Z72.0 TOBACCO USE: ICD-10-CM

## 2017-06-02 DIAGNOSIS — J80: ICD-10-CM

## 2017-06-02 DIAGNOSIS — I25.10 CORONARY ARTERY DISEASE, NON-OCCLUSIVE: ICD-10-CM

## 2017-06-02 DIAGNOSIS — E78.5 DYSLIPIDEMIA: ICD-10-CM

## 2017-06-02 DIAGNOSIS — R07.2 PRECORDIAL PAIN: ICD-10-CM

## 2017-06-02 PROCEDURE — 99214 OFFICE O/P EST MOD 30 MIN: CPT | Performed by: INTERNAL MEDICINE

## 2017-06-02 RX ORDER — GLIPIZIDE 5 MG/1
5 TABLET ORAL
Refills: 5 | COMMUNITY
Start: 2017-03-27 | End: 2017-06-22

## 2017-06-02 ASSESSMENT — ENCOUNTER SYMPTOMS
CONSTIPATION: 1
BACK PAIN: 1
INSOMNIA: 1
ABDOMINAL PAIN: 0
TINGLING: 1
ORTHOPNEA: 0
LOSS OF CONSCIOUSNESS: 0
DIARRHEA: 1
SHORTNESS OF BREATH: 1
NERVOUS/ANXIOUS: 1
BLURRED VISION: 1
MYALGIAS: 0
FEVER: 0
PALPITATIONS: 0
PND: 0
CHILLS: 0
HEARTBURN: 1
DIZZINESS: 1
HEADACHES: 1

## 2017-06-02 NOTE — MR AVS SNAPSHOT
"        Mary Byers Oscar   2017 1:00 PM   Office Visit   MRN: [id number]    Department:  [facility]   Dept Phone:  [phone]    Description:  Female : 1955   Provider:  Beck Man M.D.           Reason for Visit     Follow-Up C/O chest pains, episodes are lasting longer. Quit smoking for the most part.       Allergies as of 2017     No Known Allergies      You were diagnosed with     Precordial pain   [786.51.ICD-9-CM]       Coronary artery disease, non-occlusive   [333265]       Essential hypertension, benign   [401.1.ICD-9-CM]       Dyslipidemia   [785187]       Adult respiratory stress syndrome (CMS-HCC)   [342935]       Tobacco use   [236553]         Vital Signs     Blood Pressure Pulse Height Weight Body Mass Index Oxygen Saturation    130/70 mmHg 56 1.626 m (5' 4.02\") 67.132 kg (148 lb) 25.39 kg/m2 95%    Smoking Status                   Former Smoker           Basic Information     Date Of Birth Sex Race Ethnicity Preferred Language    1955 Female White Non- English      Your appointments     2017  3:15 PM   Established Patient with Darwin Jasmine M.D.   [facility] - Internal Medicine (--)    [address] [place] [address] [phone]           You will be receiving a confirmation call a few days before your appointment from our automated call confirmation system.              Problem List              ICD-10-CM Priority Class Noted - Resolved    Diabetes type 2, uncontrolled (CMS-HCC) E11.65   2010 - Present    Multiple pulmonary nodules R91.8   2016 - Present    Fibromyalgia M79.7   2016 - Present    GERD (gastroesophageal reflux disease) K21.9   2016 - Present    Dyslipidemia E78.5   2016 - Present    Insomnia G47.00   2016 - Present    Anxiety F41.9   2016 - Present    Vitamin D deficiency E55.9   2016 - Present    Vitamin B12 deficiency E53.8   8/15/2016 - Present   " Tobacco use Z72.0   8/18/2016 - Present    History of lupus Z87.39   8/18/2016 - Present    Essential hypertension, benign I10   11/14/2016 - Present    Coronary artery disease, non-occlusive I25.10   11/14/2016 - Present    Screening mammogram, encounter for Z12.31   11/29/2016 - Present    Adult respiratory stress syndrome (CMS-HCC) J80   6/2/2017 - Present    Precordial pain R07.2   6/2/2017 - Present      Health Maintenance        Date Due Completion Dates    IMM HEP B VACCINE (1 of 3 - Primary Series) 1955 ---    DIABETES MONOFILAMENT / LE EXAM 3/23/1957 ---    IMM DTaP/Tdap/Td Vaccine (1 - Tdap) 9/23/1975 ---    IMM ZOSTER VACCINE 9/23/2016 ---    IMM PNEUMOCOCCAL 19-64 (ADULT) MEDIUM RISK SERIES (1 of 1 - PPSV23) 1/24/2017 11/29/2016    MAMMOGRAM 2/1/2017 2/1/2016, 5/6/2013    A1C SCREENING 3/12/2017 9/12/2016, 7/2/2016, 9/21/2010, 5/8/2009    RETINAL SCREENING 6/23/2017 6/23/2016    SERUM CREATININE 8/25/2017 8/25/2016, 7/2/2016, 3/8/2016, 3/2/2016, 1/21/2016, 1/14/2016, 8/26/2013, 9/22/2010, 9/21/2010, 9/20/2010, 5/8/2009    FASTING LIPID PROFILE 11/28/2017 11/28/2016, 9/21/2010, 5/8/2009    URINE ACR / MICROALBUMIN 3/11/2018 3/11/2017    PAP SMEAR 4/11/2020 4/11/2017    COLONOSCOPY 2/1/2027 2/1/2017 (N/S)    Override on 2/1/2017: (N/S) (PER [facility] AND C NO COLONOSCOPY)            Current Immunizations     13-VALENT PCV PREVNAR 11/29/2016    Influenza Vaccine Quad Inj (Preserved) 11/22/2016, 10/9/2014      Below and/or attached are the medications your provider expects you to take. Review all of your home medications and newly ordered medications with your provider and/or pharmacist. Follow medication instructions as directed by your provider and/or pharmacist. Please keep your medication list with you and share with your provider. Update the information when medications are discontinued, doses are changed, or new medications (including over-the-counter products) are added; and carry medication  information at all times in the event of emergency situations     Allergies:  No Known Allergies          Medications  Valid as of: June 02, 2017 -  1:22 PM    Generic Name Brand Name Tablet Size Instructions for use    Aspirin (Tab) aspirin 81 MG Take 81 mg by mouth every morning. Starting 9/26         Atorvastatin Calcium (Tab) LIPITOR 40 MG Take 1 Tab by mouth every day.        Cyanocobalamin (Tab) VITAMIN B12 1000 MCG Take 2 Tabs by mouth every day.        FLUoxetine HCl (Cap) PROZAC 10 MG TAKE 1 CAPSULE BY MOUTH DAILY        Gabapentin (Cap) NEURONTIN 100 MG Take 1 Cap by mouth 3 times a day.        GlipiZIDE (Tab) GLUCOTROL 5 MG Take 5 mg by mouth.        Insulin Glargine (Solution Pen-injector) LANTUS 100 UNIT/ML Inject 20 Units as instructed every evening.        Insulin Glargine (Solution Pen-injector) LANTUS SOLOSTAR 100 UNIT/ML INJECT 20 UNITS SUBCOUTANEOUSLY AS INSTRUCTED EVERY EVENING.        Insulin Pen Needle (Misc) Insulin Pen Needle 31G X 8 MM Use as directed        Lancet Devices (Misc) TRUEDRAW LANCING DEVICE  by Does not apply route.        Lisinopril (Tab) PRINIVIL 20 MG Take 0.5 Tabs by mouth every day.        MetFORMIN HCl (Tab) GLUCOPHAGE 1000 MG Take 1 Tab by mouth every day.        Metoprolol Tartrate (Tab) LOPRESSOR 25 MG Take 0.5 Tabs by mouth 2 times a day.        Pantoprazole Sodium (Tablet Delayed Response) PROTONIX 40 MG Take 1 Tab by mouth every day.        TraZODone HCl (Tab) DESYREL 50 MG Take 1 Tab by mouth every bedtime.        .                 Medicines prescribed today were sent to:     [facility]/PHARMACY #[id number] - [place], [place] - [address]    [address] [place] [address]    Phone: [phone] Fax: [phone]    Open 24 Hours?: No      Medication refill instructions:       If your prescription bottle indicates you have medication refills left, it is not necessary to call your provider’s office. Please contact your pharmacy and they will refill your medication.    If your  prescription bottle indicates you do not have any refills left, you may request refills at any time through one of the following ways: The online [employer] system (except Urgent Care), by calling your provider’s office, or by asking your pharmacy to contact your provider’s office with a refill request. Medication refills are processed only during regular business hours and may not be available until the next business day. Your provider may request additional information or to have a follow-up visit with you prior to refilling your medication.   *Please Note: Medication refills are assigned a new Rx number when refilled electronically. Your pharmacy may indicate that no refills were authorized even though a new prescription for the same medication is available at the pharmacy. Please request the medicine by name with the pharmacy before contacting your provider for a refill.        Your To Do List     Future Labs/Procedures Complete By Expires    NM-CARDIAC STRESS TEST  As directed 12/3/2017         [employer] Status: Patient Declined

## 2017-06-02 NOTE — Clinical Note
"     [facility]   [address], [address]  [place] [address]  Phone: [phone]  Fax: [phone]              Mary Moore  1955    Encounter Date: 6/2/2017    Beck Man M.D.          PROGRESS NOTE:  Subjective:   Mary Moore is a 61 y.o. female who presents today for follow-up evaluation of chest discomfort, CAD, hypertension and hyperlipidemia.    Last seen on 11/14/2016.    Since 11/14/2016 appointment the patient has had recent recurrent jaw and chest pain with without exertion.  Continues to smoke cigarettes.  States he has a lot of \"stress\" at home.    Has occasional nocturnal chest discomfort.  However has chronic insomnia.  Continues to smoke cigarettes.  Missed her scheduled appointment with the \"lung doctor\".  Has a rescheduled appointment on 12/5/2016.    Past medical history  Back in August, 2016 the past 3-4 weeks the patient reports to me symptoms of substernal toothache-like chest discomfort and jaw pain and headache.  The symptoms occur with or without activity.  She also feels episodes of lightheadedness without palpitations.  She doesn't feel comfortable about driving.  General he doesn't feel well.  Under a lot of stress for various reasons.    In 2009, seen by Dr. Demetrius Rosen evaluation of anginal like symptoms.  Cardiac catheterization showed minimal coronary artery disease and an EF of 75%.  Medical therapy and smoking cessation was recommended.    The patient continues to smoke cigarettes.  She has significant history of hypertension, diabetes mellitus and hypercholesterolemia.    Past Medical History   Diagnosis Date   • Insomnia      chronic   • Hyperlipidemia    • Hypertension      bordeline    • Multiple pulmonary nodules 6/23/2016   • Fibromyalgia 6/23/2016   • Constipation 6/23/2016   • Chest pain 6/23/2016   • Hypercalcemia 6/23/2016   • Urinary incontinence 6/23/2016   • Epigastric pain 6/23/2016   • GERD " (gastroesophageal reflux disease) 6/23/2016   • Lentigo 6/23/2016   • Lupus erythematosus 6/23/2016   • Dyslipidemia 6/23/2016   • HTN (hypertension) 6/23/2016   • Anxiety 6/23/2016   • Vitamin D deficiency 6/23/2016   • Facial rash 6/23/2016     Past Surgical History   Procedure Laterality Date   • Recovery  8/25/2016     Procedure: CATH LAB-C W/POSSIBLE-RITU;  Surgeon: Recoveryonly Surgery;  Location: SURGERY PRE-POST PROC UNIT [facility] [place];  Service:      Family History   Problem Relation Age of Onset   • Heart Disease Father    • Stroke Father      History   Smoking status   • Former Smoker -- 0.50 packs/day for 40 years   • Types: Cigarettes   • Quit date: 04/01/2017   Smokeless tobacco   • Never Used     Comment: 1/2 ppd - 1ppd      No Known Allergies  Outpatient Encounter Prescriptions as of 6/2/2017   Medication Sig Dispense Refill   • fluoxetine (PROZAC) 10 MG Cap TAKE 1 CAPSULE BY MOUTH DAILY 30 Cap 3   • pantoprazole (PROTONIX) 40 MG Tablet Delayed Response Take 1 Tab by mouth every day. 30 Tab 1   • trazodone (DESYREL) 50 MG Tab Take 1 Tab by mouth every bedtime. 30 Tab 0   • cyanocobalamin (VITAMIN B12) 1000 MCG Tab Take 2 Tabs by mouth every day. 60 Tab 6   • metformin (GLUCOPHAGE) 1000 MG tablet Take 1 Tab by mouth every day. 30 Tab 6   • atorvastatin (LIPITOR) 40 MG Tab Take 1 Tab by mouth every day. 30 Tab 5   • metoprolol (LOPRESSOR) 25 MG Tab Take 0.5 Tabs by mouth 2 times a day. 120 Tab 3   • gabapentin (NEURONTIN) 100 MG Cap Take 1 Cap by mouth 3 times a day. 90 Cap 3   • lisinopril (PRINIVIL) 20 MG Tab Take 0.5 Tabs by mouth every day. 30 Tab 6   • insulin glargine (LANTUS) 100 UNIT/ML Solution Pen-injector injection Inject 20 Units as instructed every evening. 3 PEN 6   • Insulin Pen Needle 31G X 8 MM Misc Use as directed 100 Each 6   • Lancet Devices (TRUEDRAW LANCING DEVICE) Misc by Does not apply route.     • aspirin 81 MG tablet Take 81 mg by mouth every morning. Starting 9/26      •  "glipiZIDE (GLUCOTROL) 5 MG Tab Take 5 mg by mouth.  5   • LANTUS SOLOSTAR 100 UNIT/ML Solution Pen-injector injection INJECT 20 UNITS SUBCOUTANEOUSLY AS INSTRUCTED EVERY EVENING. (Patient not taking: Reported on 6/2/2017) 15 PEN 6     No facility-administered encounter medications on file as of 6/2/2017.     Review of Systems   Constitutional: Positive for malaise/fatigue. Negative for fever and chills.   HENT: Positive for congestion.    Eyes: Positive for blurred vision.   Respiratory: Positive for shortness of breath.    Cardiovascular: Positive for chest pain. Negative for palpitations, orthopnea, leg swelling and PND.   Gastrointestinal: Positive for heartburn, diarrhea and constipation. Negative for abdominal pain.   Genitourinary: Negative for dysuria.   Musculoskeletal: Positive for back pain and joint pain. Negative for myalgias.   Skin: Negative for rash.   Neurological: Positive for dizziness, tingling and headaches. Negative for loss of consciousness.   Psychiatric/Behavioral: The patient is nervous/anxious and has insomnia.         Objective:   /70 mmHg  Pulse 56  Ht 1.626 m (5' 4.02\")  Wt 67.132 kg (148 lb)  BMI 25.39 kg/m2  SpO2 95%    Physical Exam   Constitutional: She is oriented to person, place, and time. She appears well-nourished. No distress.   HENT:   Head: Normocephalic and atraumatic.   Poor dentition.   Neck: Neck supple. No JVD present.   Normal jugular venous pressure.   Cardiovascular: Normal rate, regular rhythm, S1 normal and S2 normal.  Exam reveals no gallop and no friction rub.    No murmur heard.  Pulses:       Carotid pulses are 2+ on the right side, and 2+ on the left side.       Radial pulses are 2+ on the right side, and 2+ on the left side.   Pulmonary/Chest: Effort normal and breath sounds normal. She has no wheezes. She has no rhonchi. She has no rales.   Musculoskeletal: She exhibits no edema.   Neurological: She is alert and oriented to person, place, and time. " She has normal strength. Gait normal.   Skin: Skin is warm and dry. No cyanosis. Nails show no clubbing.   Psychiatric: She has a normal mood and affect. Her behavior is normal.    08/08/2016 EKG: Normal sinus rhythm, rate 63. Reviewed by myself.    11/16/2009 ECHOCARDIOGRAM  EF 65%.  No valvular disease.  Pulmonary artery pressure 25-30 mmHg.    11/17/2009 CARDIAC CATHETERIZATION  EF 75%.  Minimal coronary artery disease.  Medical therapy and smoking cessation recommended.    DATE OF SERVICE:  08/25/2016  PROCEDURE:  Cardiac catheterization.  A.  Left heart catheterization.  B.  Left ventriculography.  C.  Selective coronary artery.  D.  Right radial artery approach.  1.  EF 64%.  Normal wall motion.  2.  Proximal LAD at 20% ostial.  3.  Ostial D1 50%-60%.    7/6/2016 laboratory reviewed.    Assessment:     1. Precordial pain  NM-CARDIAC STRESS TEST   2. Coronary artery disease, non-occlusive  NM-CARDIAC STRESS TEST   3. Essential hypertension, benign     4. Dyslipidemia     5. Adult respiratory stress syndrome (CMS-HCC)     6. Tobacco use         Medical Decision Making:  Today's Assessment / Status / Plan:     Chest jaw discomfort.    CAD.    Hypertension. Controlled.    Hyperlipidemia.  On atorvastatin.    Diabetes mellitus.    Chronic tobacco use.    Insomnia/sleep disorder.    Polypharmacy.    Adult situational stress syndrome.    Recommendations/Discussion:  Again I counseled the patient and admonished her to stop smoking.  We'll proceed with a stress MPI.  Follow-up one year..      Darwin Jasmine M.D.  [address] [address]  VIA In Basket

## 2017-06-02 NOTE — PROGRESS NOTES
"Subjective:   Mary Moore is a 61 y.o. female who presents today for follow-up evaluation of chest discomfort, CAD, hypertension and hyperlipidemia.    Last seen on 11/14/2016.    Since 11/14/2016 appointment the patient has had recent recurrent jaw and chest pain with without exertion.  Continues to smoke cigarettes.  States he has a lot of \"stress\" at home.    Has occasional nocturnal chest discomfort.  However has chronic insomnia.  Continues to smoke cigarettes.  Missed her scheduled appointment with the \"lung doctor\".  Has a rescheduled appointment on 12/5/2016.    Past medical history  Back in August, 2016 the past 3-4 weeks the patient reports to me symptoms of substernal toothache-like chest discomfort and jaw pain and headache.  The symptoms occur with or without activity.  She also feels episodes of lightheadedness without palpitations.  She doesn't feel comfortable about driving.  General he doesn't feel well.  Under a lot of stress for various reasons.    In 2009, seen by Dr. Demetrius Rosen evaluation of anginal like symptoms.  Cardiac catheterization showed minimal coronary artery disease and an EF of 75%.  Medical therapy and smoking cessation was recommended.    The patient continues to smoke cigarettes.  She has significant history of hypertension, diabetes mellitus and hypercholesterolemia.    Past Medical History   Diagnosis Date   • Insomnia      chronic   • Hyperlipidemia    • Hypertension      bordeline    • Multiple pulmonary nodules 6/23/2016   • Fibromyalgia 6/23/2016   • Constipation 6/23/2016   • Chest pain 6/23/2016   • Hypercalcemia 6/23/2016   • Urinary incontinence 6/23/2016   • Epigastric pain 6/23/2016   • GERD (gastroesophageal reflux disease) 6/23/2016   • Lentigo 6/23/2016   • Lupus erythematosus 6/23/2016   • Dyslipidemia 6/23/2016   • HTN (hypertension) 6/23/2016   • Anxiety 6/23/2016   • Vitamin D deficiency 6/23/2016   • Facial rash 6/23/2016     Past Surgical History "   Procedure Laterality Date   • Recovery  8/25/2016     Procedure: CATH LAB-[facility] W/POSSIBLE-RITU;  Surgeon: Recoveryonly Surgery;  Location: SURGERY PRE-POST PROC UNIT [facility] [place];  Service:      Family History   Problem Relation Age of Onset   • Heart Disease Father    • Stroke Father      History   Smoking status   • Former Smoker -- 0.50 packs/day for 40 years   • Types: Cigarettes   • Quit date: 04/01/2017   Smokeless tobacco   • Never Used     Comment: 1/2 ppd - 1ppd      No Known Allergies  Outpatient Encounter Prescriptions as of 6/2/2017   Medication Sig Dispense Refill   • fluoxetine (PROZAC) 10 MG Cap TAKE 1 CAPSULE BY MOUTH DAILY 30 Cap 3   • pantoprazole (PROTONIX) 40 MG Tablet Delayed Response Take 1 Tab by mouth every day. 30 Tab 1   • trazodone (DESYREL) 50 MG Tab Take 1 Tab by mouth every bedtime. 30 Tab 0   • cyanocobalamin (VITAMIN B12) 1000 MCG Tab Take 2 Tabs by mouth every day. 60 Tab 6   • metformin (GLUCOPHAGE) 1000 MG tablet Take 1 Tab by mouth every day. 30 Tab 6   • atorvastatin (LIPITOR) 40 MG Tab Take 1 Tab by mouth every day. 30 Tab 5   • metoprolol (LOPRESSOR) 25 MG Tab Take 0.5 Tabs by mouth 2 times a day. 120 Tab 3   • gabapentin (NEURONTIN) 100 MG Cap Take 1 Cap by mouth 3 times a day. 90 Cap 3   • lisinopril (PRINIVIL) 20 MG Tab Take 0.5 Tabs by mouth every day. 30 Tab 6   • insulin glargine (LANTUS) 100 UNIT/ML Solution Pen-injector injection Inject 20 Units as instructed every evening. 3 PEN 6   • Insulin Pen Needle 31G X 8 MM Misc Use as directed 100 Each 6   • Lancet Devices (TRUEDRAW LANCING DEVICE) Misc by Does not apply route.     • aspirin 81 MG tablet Take 81 mg by mouth every morning. Starting 9/26      • glipiZIDE (GLUCOTROL) 5 MG Tab Take 5 mg by mouth.  5   • LANTUS SOLOSTAR 100 UNIT/ML Solution Pen-injector injection INJECT 20 UNITS SUBCOUTANEOUSLY AS INSTRUCTED EVERY EVENING. (Patient not taking: Reported on 6/2/2017) 15 PEN 6     No facility-administered encounter  "medications on file as of 6/2/2017.     Review of Systems   Constitutional: Positive for malaise/fatigue. Negative for fever and chills.   HENT: Positive for congestion.    Eyes: Positive for blurred vision.   Respiratory: Positive for shortness of breath.    Cardiovascular: Positive for chest pain. Negative for palpitations, orthopnea, leg swelling and PND.   Gastrointestinal: Positive for heartburn, diarrhea and constipation. Negative for abdominal pain.   Genitourinary: Negative for dysuria.   Musculoskeletal: Positive for back pain and joint pain. Negative for myalgias.   Skin: Negative for rash.   Neurological: Positive for dizziness, tingling and headaches. Negative for loss of consciousness.   Psychiatric/Behavioral: The patient is nervous/anxious and has insomnia.         Objective:   /70 mmHg  Pulse 56  Ht 1.626 m (5' 4.02\")  Wt 67.132 kg (148 lb)  BMI 25.39 kg/m2  SpO2 95%    Physical Exam   Constitutional: She is oriented to person, place, and time. She appears well-nourished. No distress.   HENT:   Head: Normocephalic and atraumatic.   Poor dentition.   Neck: Neck supple. No JVD present.   Normal jugular venous pressure.   Cardiovascular: Normal rate, regular rhythm, S1 normal and S2 normal.  Exam reveals no gallop and no friction rub.    No murmur heard.  Pulses:       Carotid pulses are 2+ on the right side, and 2+ on the left side.       Radial pulses are 2+ on the right side, and 2+ on the left side.   Pulmonary/Chest: Effort normal and breath sounds normal. She has no wheezes. She has no rhonchi. She has no rales.   Musculoskeletal: She exhibits no edema.   Neurological: She is alert and oriented to person, place, and time. She has normal strength. Gait normal.   Skin: Skin is warm and dry. No cyanosis. Nails show no clubbing.   Psychiatric: She has a normal mood and affect. Her behavior is normal.    08/08/2016 EKG: Normal sinus rhythm, rate 63. Reviewed by myself.    11/16/2009 " ECHOCARDIOGRAM  EF 65%.  No valvular disease.  Pulmonary artery pressure 25-30 mmHg.    11/17/2009 CARDIAC CATHETERIZATION  EF 75%.  Minimal coronary artery disease.  Medical therapy and smoking cessation recommended.    DATE OF SERVICE:  08/25/2016  PROCEDURE:  Cardiac catheterization.  A.  Left heart catheterization.  B.  Left ventriculography.  C.  Selective coronary artery.  D.  Right radial artery approach.  1.  EF 64%.  Normal wall motion.  2.  Proximal LAD at 20% ostial.  3.  Ostial D1 50%-60%.    7/6/2016 laboratory reviewed.    Assessment:     1. Precordial pain  NM-CARDIAC STRESS TEST   2. Coronary artery disease, non-occlusive  NM-CARDIAC STRESS TEST   3. Essential hypertension, benign     4. Dyslipidemia     5. Adult respiratory stress syndrome (CMS-HCC)     6. Tobacco use         Medical Decision Making:  Today's Assessment / Status / Plan:     Chest jaw discomfort.    CAD.    Hypertension. Controlled.    Hyperlipidemia.  On atorvastatin.    Diabetes mellitus.    Chronic tobacco use.    Insomnia/sleep disorder.    Polypharmacy.    Adult situational stress syndrome.    Recommendations/Discussion:  Again I counseled the patient and admonished her to stop smoking.  We'll proceed with a stress MPI.  Follow-up one year..

## 2017-06-13 ENCOUNTER — HOSPITAL ENCOUNTER (OUTPATIENT)
Dept: RADIOLOGY | Facility: MEDICAL CENTER | Age: 62
End: 2017-06-13
Attending: INTERNAL MEDICINE
Payer: MEDICAID

## 2017-06-13 DIAGNOSIS — I25.10 CORONARY ARTERY DISEASE, NON-OCCLUSIVE: ICD-10-CM

## 2017-06-13 DIAGNOSIS — R07.2 PRECORDIAL PAIN: ICD-10-CM

## 2017-06-13 PROCEDURE — A9502 TC99M TETROFOSMIN: HCPCS

## 2017-06-13 PROCEDURE — 700111 HCHG RX REV CODE 636 W/ 250 OVERRIDE (IP)

## 2017-06-13 RX ORDER — REGADENOSON 0.08 MG/ML
INJECTION, SOLUTION INTRAVENOUS
Status: COMPLETED
Start: 2017-06-13 | End: 2017-06-13

## 2017-06-13 RX ADMIN — REGADENOSON 0.4 MG: 0.08 INJECTION, SOLUTION INTRAVENOUS at 11:04

## 2017-06-17 ENCOUNTER — HOSPITAL ENCOUNTER (OUTPATIENT)
Dept: LAB | Facility: MEDICAL CENTER | Age: 62
End: 2017-06-17
Attending: GENERAL ACUTE CARE HOSPITAL
Payer: MEDICAID

## 2017-06-17 DIAGNOSIS — E53.8 VITAMIN B12 DEFICIENCY: ICD-10-CM

## 2017-06-17 LAB — VIT B12 SERPL-MCNC: >1500 PG/ML (ref 211–911)

## 2017-06-17 PROCEDURE — 82607 VITAMIN B-12: CPT

## 2017-06-17 PROCEDURE — 36415 COLL VENOUS BLD VENIPUNCTURE: CPT

## 2017-06-19 ENCOUNTER — TELEPHONE (OUTPATIENT)
Dept: CARDIOLOGY | Facility: MEDICAL CENTER | Age: 62
End: 2017-06-19

## 2017-06-19 NOTE — TELEPHONE ENCOUNTER
Called pt to inform that MPI showed no abnormalities. No answer so LM that stress test was normal and to call with any questions or concerns.

## 2017-06-22 ENCOUNTER — OFFICE VISIT (OUTPATIENT)
Dept: INTERNAL MEDICINE | Facility: MEDICAL CENTER | Age: 62
End: 2017-06-22
Payer: MEDICAID

## 2017-06-22 VITALS
BODY MASS INDEX: 25.88 KG/M2 | WEIGHT: 151.6 LBS | SYSTOLIC BLOOD PRESSURE: 125 MMHG | HEIGHT: 64 IN | OXYGEN SATURATION: 93 % | HEART RATE: 71 BPM | TEMPERATURE: 97.5 F | DIASTOLIC BLOOD PRESSURE: 81 MMHG

## 2017-06-22 DIAGNOSIS — E11.8 UNCONTROLLED TYPE 2 DIABETES MELLITUS WITH COMPLICATION, UNSPECIFIED LONG TERM INSULIN USE STATUS: ICD-10-CM

## 2017-06-22 DIAGNOSIS — E11.42 DIABETIC POLYNEUROPATHY ASSOCIATED WITH TYPE 2 DIABETES MELLITUS (HCC): ICD-10-CM

## 2017-06-22 DIAGNOSIS — I10 ESSENTIAL HYPERTENSION, BENIGN: ICD-10-CM

## 2017-06-22 DIAGNOSIS — E11.65 UNCONTROLLED TYPE 2 DIABETES MELLITUS WITH COMPLICATION, UNSPECIFIED LONG TERM INSULIN USE STATUS: ICD-10-CM

## 2017-06-22 DIAGNOSIS — E53.8 VITAMIN B12 DEFICIENCY: ICD-10-CM

## 2017-06-22 DIAGNOSIS — K21.9 GASTROESOPHAGEAL REFLUX DISEASE WITHOUT ESOPHAGITIS: ICD-10-CM

## 2017-06-22 DIAGNOSIS — R55 NEAR SYNCOPE: ICD-10-CM

## 2017-06-22 DIAGNOSIS — Z72.0 TOBACCO USE: ICD-10-CM

## 2017-06-22 LAB
HBA1C MFR BLD: 11.4 % (ref ?–5.8)
INT CON NEG: NORMAL
INT CON POS: NORMAL

## 2017-06-22 PROCEDURE — 99213 OFFICE O/P EST LOW 20 MIN: CPT | Mod: GE | Performed by: INTERNAL MEDICINE

## 2017-06-22 PROCEDURE — 83036 HEMOGLOBIN GLYCOSYLATED A1C: CPT | Performed by: INTERNAL MEDICINE

## 2017-06-22 RX ORDER — LISINOPRIL 10 MG/1
10 TABLET ORAL DAILY
Qty: 30 TAB | Refills: 3 | Status: SHIPPED | OUTPATIENT
Start: 2017-06-22 | End: 2018-07-25 | Stop reason: SDUPTHER

## 2017-06-22 RX ORDER — DULOXETIN HYDROCHLORIDE 60 MG/1
60 CAPSULE, DELAYED RELEASE ORAL DAILY
Qty: 30 CAP | Refills: 0 | Status: SHIPPED | OUTPATIENT
Start: 2017-06-22 | End: 2017-09-12

## 2017-06-22 ASSESSMENT — PAIN SCALES - GENERAL: PAINLEVEL: NO PAIN

## 2017-06-22 NOTE — PROGRESS NOTES
Established Patient    Mary presents today with the following:    CC: 5 week follow up.    HPI: 61-year-old female here for follow-up.    B12 deficiency: Recheck B12 level is more than 1500. Patient is currently taking 2000 µg daily.    Tobacco abuse: Patient quit smoking completely. She states she is not able to sleep ever since she did that.    GERD: Last time it was recommended that she stop taking the pantoprazole. Patient states that she has completely discontinued it and feels much better without it. Denies any heartburn or chest pain.    Hypertension: Patient was taking double the dose of lisinopril therapy prescribed. Has been feeling dizzy. Of note her dizziness has been worked up extensively in the past.     Dyslipidemia: She is currently on atorvastatin. Denies any muscle aches or pains.    Depression: Patient is off of fluoxetine. She is not on any antidepressants anymore. Denies depression. Does admit to anxiety.    Fibromyalgia: Still waiting to hear from rheumatology referral.    Diabetic neuropathy: States that gabapentin is not working and she would want to try another medication. She states that she is not taking Elavil or Prozac.    Patient Active Problem List    Diagnosis Date Noted   • Adult respiratory stress syndrome (CMS-HCC) 06/02/2017   • Precordial pain 06/02/2017   • Screening mammogram, encounter for 11/29/2016   • Essential hypertension, benign 11/14/2016   • Coronary artery disease, non-occlusive 11/14/2016   • Tobacco use 08/18/2016   • History of lupus 08/18/2016   • Vitamin B12 deficiency 08/15/2016   • Multiple pulmonary nodules 06/23/2016   • Fibromyalgia 06/23/2016   • GERD (gastroesophageal reflux disease) 06/23/2016   • Dyslipidemia 06/23/2016   • Insomnia 06/23/2016   • Anxiety 06/23/2016   • Vitamin D deficiency 06/23/2016   • Diabetes type 2, uncontrolled (CMS-[facility]) 09/21/2010       Current Outpatient Prescriptions   Medication Sig Dispense Refill   • cyanocobalamin  "(VITAMIN B12) 1000 MCG Tab Take 1 Tab by mouth every day. 60 Tab 1   • lisinopril (PRINIVIL) 10 MG Tab Take 1 Tab by mouth every day. 30 Tab 3   • insulin glargine (BASAGLAR KWIKPEN) 100 UNIT/ML Solution Pen-injector injection Inject 20 Units as instructed every evening. 3 PEN 6   • trazodone (DESYREL) 50 MG Tab TAKE 1 TABLET BY MOUTH EVERY BEDTIME. 90 Tab 0   • metformin (GLUCOPHAGE) 1000 MG tablet Take 1 Tab by mouth every day. 30 Tab 6   • atorvastatin (LIPITOR) 40 MG Tab Take 1 Tab by mouth every day. 30 Tab 5   • metoprolol (LOPRESSOR) 25 MG Tab Take 0.5 Tabs by mouth 2 times a day. 120 Tab 3   • Insulin Pen Needle 31G X 8 MM Misc Use as directed 100 Each 6   • Lancet Devices (TRUEDRAW LANCING DEVICE) Misc by Does not apply route.     • aspirin 81 MG tablet Take 81 mg by mouth every morning. Starting 9/26      • LANTUS SOLOSTAR 100 UNIT/ML Solution Pen-injector injection INJECT 20 UNITS SUBCOUTANEOUSLY AS INSTRUCTED EVERY EVENING. (Patient not taking: Reported on 6/2/2017) 15 PEN 6     No current facility-administered medications for this visit.       ROS: As per HPI.     /81 mmHg  Pulse 71  Temp(Src) 36.4 °C (97.5 °F)  Ht 1.626 m (5' 4.02\")  Wt 68.765 kg (151 lb 9.6 oz)  BMI 26.01 kg/m2  SpO2 93%  Breastfeeding? No    Physical Exam   Constitutional:  oriented to person, place, and time. No distress.   Eyes: Pupils are equal, round, and reactive to light. No scleral icterus.  Neck: Neck supple. No thyromegaly present.   Cardiovascular: Normal rate, regular rhythm and normal heart sounds.  Exam reveals no gallop and no friction rub.  No murmur heard.  Pulmonary/Chest: Breath sounds normal. Chest wall is not dull to percussion.   Musculoskeletal:   no edema.   Lymphadenopathy: no cervical adenopathy  Neurological: alert and oriented to person, place, and time.   Skin: No cyanosis. Nails show no clubbing.      Assessment and Plan    1. Vitamin B12 deficiency  Since B12 levels are currently good will " cut down B12 from 2000 to 1000 µg.  - cyanocobalamin (VITAMIN B12) 1000 MCG Tab; Take 1 Tab by mouth every day.  Dispense: 60 Tab; Refill: 1    2. Tobacco use  Congratulated about quitting smoking    3. Gastroesophageal reflux disease without esophagitis  Off of pantoprazole. Denies any heartburn    4. Essential hypertension, benign  Educated about proper medication use.  Ordered lisinopril 10 mg so that she doesn't take double the dose prescribed.  Could be contributing to her dizziness.    5. Uncontrolled type 2 diabetes mellitus with complication, unspecified long term insulin use status (CMS-[facility])  A1c in office is 11 which is significantly down from 6.6 in March. A1c before that was 7 so it seems like this could be a lab error. Patient states that she's been taking medication as prescribed  She is currently on Lantus 20 units and metformin 1000 daily.  The only change last visit was cutting down her metformin from twice a day to once a day so it is unlikely that her A1c would jump 4 points.  We will repeat A1c and titrate her medications based on that.  - POCT Hemoglobin A1C  - HEMOGLOBIN A1C; Future    6. Diabetic neuropathy  Discontinued gabapentin as it's not working. Prescribed duloxetine instead.  Made sure the patient is not taking fluoxetine.      Followup: 5 weeks    Signed by: Darwin Jasmine M.D.

## 2017-06-22 NOTE — PATIENT INSTRUCTIONS
Diabetes and Exercise  Exercising regularly is important. It is not just about losing weight. It has many health benefits, such as:  · Improving your overall fitness, flexibility, and endurance.  · Increasing your bone density.  · Helping with weight control.  · Decreasing your body fat.  · Increasing your muscle strength.  · Reducing stress and tension.  · Improving your overall health.  People with diabetes who exercise gain additional benefits because exercise:  · Reduces appetite.  · Improves the body's use of blood sugar (glucose).  · Helps lower or control blood glucose.  · Decreases blood pressure.  · Helps control blood lipids (such as cholesterol and triglycerides).  · Improves the body's use of the hormone insulin by:  ¨ Increasing the body's insulin sensitivity.  ¨ Reducing the body's insulin needs.  · Decreases the risk for heart disease because exercising:  ¨ Lowers cholesterol and triglycerides levels.  ¨ Increases the levels of good cholesterol (such as high-density lipoproteins [HDL]) in the body.  ¨ Lowers blood glucose levels.  YOUR ACTIVITY PLAN   Choose an activity that you enjoy, and set realistic goals. To exercise safely, you should begin practicing any new physical activity slowly, and gradually increase the intensity of the exercise over time. Your health care provider or diabetes educator can help create an activity plan that works for you. General recommendations include:  · Encouraging children to engage in at least 60 minutes of physical activity each day.  · Stretching and performing strength training exercises, such as yoga or weight lifting, at least 2 times per week.  · Performing a total of at least 150 minutes of moderate-intensity exercise each week, such as brisk walking or water aerobics.  · Exercising at least 3 days per week, making sure you allow no more than 2 consecutive days to pass without exercising.  · Avoiding long periods of inactivity (90 minutes or more). When you  have to spend an extended period of time sitting down, take frequent breaks to walk or stretch.  RECOMMENDATIONS FOR EXERCISING WITH TYPE 1 OR TYPE 2 DIABETES   · Check your blood glucose before exercising. If blood glucose levels are greater than 240 mg/dL, check for urine ketones. Do not exercise if ketones are present.  · Avoid injecting insulin into areas of the body that are going to be exercised. For example, avoid injecting insulin into:  ¨ The arms when playing tennis.  ¨ The legs when jogging.  · Keep a record of:  ¨ Food intake before and after you exercise.  ¨ Expected peak times of insulin action.  ¨ Blood glucose levels before and after you exercise.  ¨ The type and amount of exercise you have done.  · Review your records with your health care provider. Your health care provider will help you to develop guidelines for adjusting food intake and insulin amounts before and after exercising.  · If you take insulin or oral hypoglycemic agents, watch for signs and symptoms of hypoglycemia. They include:  ¨ Dizziness.  ¨ Shaking.  ¨ Sweating.  ¨ Chills.  ¨ Confusion.  · Drink plenty of water while you exercise to prevent dehydration or heat stroke. Body water is lost during exercise and must be replaced.  · Talk to your health care provider before starting an exercise program to make sure it is safe for you. Remember, almost any type of activity is better than none.     This information is not intended to replace advice given to you by your health care provider. Make sure you discuss any questions you have with your health care provider.     Document Released: 03/09/2005 Document Revised: 05/03/2016 Document Reviewed: 05/27/2014  [employer] Patient Education ©2016 [employer]

## 2017-06-22 NOTE — MR AVS SNAPSHOT
"        Mary Gibbsbe   2017 3:15 PM   Office Visit   MRN: [id number]    Department:  [facility] - Internal Med   Dept Phone:  [phone]    Description:  Female : 1955   Provider:  Darwin Jasmine M.D.           Reason for Visit     Follow-Up labs      Allergies as of 2017     No Known Allergies      You were diagnosed with     Vitamin B12 deficiency   [981407]   B12 lab: 205.  Sounds like intake is good.  Ordered B12 1000mcg injection with daily PO 1000mcg B12.  Pernicious workup: Parietal and itrins    Tobacco use   [187866]       Gastroesophageal reflux disease without esophagitis   [365764]       Essential hypertension, benign   [401.1.ICD-9-CM]       Uncontrolled type 2 diabetes mellitus with complication, unspecified long term insulin use status (CMS-HCC)   [9242269]         Vital Signs     Blood Pressure Pulse Temperature Height Weight Body Mass Index    125/81 mmHg 71 36.4 °C (97.5 °F) 1.626 m (5' 4.02\") 68.765 kg (151 lb 9.6 oz) 26.01 kg/m2    Oxygen Saturation Breastfeeding? Smoking Status             93% No Former Smoker         Basic Information     Date Of Birth Sex Race Ethnicity Preferred Language    1955 Female White Non- English      Your appointments     2017  3:00 PM   NEW TO YOU with Miki Newsome M.D.   [facility] / [facility] - Internal Medicine (--)    [address] [place] [address] [id number] [phone]              Problem List              ICD-10-CM Priority Class Noted - Resolved    Diabetes type 2, uncontrolled (CMS-HCC) E11.65   2010 - Present    Multiple pulmonary nodules R91.8   2016 - Present    Fibromyalgia M79.7   2016 - Present    GERD (gastroesophageal reflux disease) K21.9   2016 - Present    Dyslipidemia E78.5   2016 - Present    Insomnia G47.00   2016 - Present    Anxiety F41.9   2016 - Present    Vitamin D deficiency E55.9   2016 - Present    Vitamin B12 deficiency E53.8   " 8/15/2016 - Present    Tobacco use Z72.0   8/18/2016 - Present    History of lupus Z87.39   8/18/2016 - Present    Essential hypertension, benign I10   11/14/2016 - Present    Coronary artery disease, non-occlusive I25.10   11/14/2016 - Present    Screening mammogram, encounter for Z12.31   11/29/2016 - Present    Adult respiratory stress syndrome (CMS-HCC) J80   6/2/2017 - Present    Precordial pain R07.2   6/2/2017 - Present      Health Maintenance        Date Due Completion Dates    IMM HEP B VACCINE (1 of 3 - Primary Series) 1955 ---    DIABETES MONOFILAMENT / LE EXAM 3/23/1956 ---    IMM DTaP/Tdap/Td Vaccine (1 - Tdap) 9/23/1975 ---    IMM ZOSTER VACCINE 9/23/2016 ---    IMM PNEUMOCOCCAL 19-64 (ADULT) MEDIUM RISK SERIES (1 of 1 - PPSV23) 1/24/2017 11/29/2016    MAMMOGRAM 2/1/2017 2/1/2016, 5/6/2013    A1C SCREENING 3/12/2017 9/12/2016, 7/2/2016, 9/21/2010, 5/8/2009    RETINAL SCREENING 6/23/2017 6/23/2016    SERUM CREATININE 8/25/2017 8/25/2016, 7/2/2016, 3/8/2016, 3/2/2016, 1/21/2016, 1/14/2016, 8/26/2013, 9/22/2010, 9/21/2010, 9/20/2010, 5/8/2009    FASTING LIPID PROFILE 11/28/2017 11/28/2016, 9/21/2010, 5/8/2009    URINE ACR / MICROALBUMIN 3/11/2018 3/11/2017    PAP SMEAR 4/11/2020 4/11/2017    COLONOSCOPY 2/1/2027 2/1/2017 (N/S)    Override on 2/1/2017: (N/S) (PER [facility] NO COLONOSCOPY)            Results     POCT Hemoglobin A1C      Component    Glycohemoglobin    11.4    Internal Control Negative    Valid    Internal Control Positive    Valid                        Current Immunizations     13-VALENT PCV PREVNAR 11/29/2016    Influenza Vaccine Quad Inj (Preserved) 11/22/2016, 10/9/2014      Below and/or attached are the medications your provider expects you to take. Review all of your home medications and newly ordered medications with your provider and/or pharmacist. Follow medication instructions as directed by your provider and/or pharmacist. Please keep your medication list with you and  share with your provider. Update the information when medications are discontinued, doses are changed, or new medications (including over-the-counter products) are added; and carry medication information at all times in the event of emergency situations     Allergies:  No Known Allergies          Medications  Valid as of: June 22, 2017 -  3:19 PM    Generic Name Brand Name Tablet Size Instructions for use    Aspirin (Tab) aspirin 81 MG Take 81 mg by mouth every morning. Starting 9/26         Atorvastatin Calcium (Tab) LIPITOR 40 MG Take 1 Tab by mouth every day.        Cyanocobalamin (Tab) VITAMIN B12 1000 MCG Take 1 Tab by mouth every day.        DULoxetine HCl (Cap DR Particles) CYMBALTA 60 MG Take 1 Cap by mouth every day.        Insulin Glargine (Solution Pen-injector) LANTUS SOLOSTAR 100 UNIT/ML INJECT 20 UNITS SUBCOUTANEOUSLY AS INSTRUCTED EVERY EVENING.        Insulin Glargine (Solution Pen-injector) LANTUS 100 UNIT/ML Inject 20 Units as instructed every evening.        Insulin Pen Needle (Misc) Insulin Pen Needle 31G X 8 MM Use as directed        Lancet Devices (Misc) TRUEDRAW LANCING DEVICE  by Does not apply route.        Lisinopril (Tab) PRINIVIL 10 MG Take 1 Tab by mouth every day.        MetFORMIN HCl (Tab) GLUCOPHAGE 1000 MG Take 1 Tab by mouth every day.        Metoprolol Tartrate (Tab) LOPRESSOR 25 MG Take 0.5 Tabs by mouth 2 times a day.        TraZODone HCl (Tab) DESYREL 50 MG TAKE 1 TABLET BY MOUTH EVERY BEDTIME.        .                 Medicines prescribed today were sent to:     [facility]/PHARMACY [facility][id number]  [place] - [address] [place] [address]    Phone: [phone] Fax: [phone]    Open 24 Hours?: No      Medication refill instructions:       If your prescription bottle indicates you have medication refills left, it is not necessary to call your provider’s office. Please contact your pharmacy and they will refill your medication.    If your prescription bottle indicates  you do not have any refills left, you may request refills at any time through one of the following ways: The online [employer] system (except Urgent Care), by calling your provider’s office, or by asking your pharmacy to contact your provider’s office with a refill request. Medication refills are processed only during regular business hours and may not be available until the next business day. Your provider may request additional information or to have a follow-up visit with you prior to refilling your medication.   *Please Note: Medication refills are assigned a new Rx number when refilled electronically. Your pharmacy may indicate that no refills were authorized even though a new prescription for the same medication is available at the pharmacy. Please request the medicine by name with the pharmacy before contacting your provider for a refill.        Your To Do List     Future Labs/Procedures Complete By Expires    HEMOGLOBIN A1C  As directed 6/22/2018      Instructions    Diabetes and Exercise  Exercising regularly is important. It is not just about losing weight. It has many health benefits, such as:  · Improving your overall fitness, flexibility, and endurance.  · Increasing your bone density.  · Helping with weight control.  · Decreasing your body fat.  · Increasing your muscle strength.  · Reducing stress and tension.  · Improving your overall health.  People with diabetes who exercise gain additional benefits because exercise:  · Reduces appetite.  · Improves the body's use of blood sugar (glucose).  · Helps lower or control blood glucose.  · Decreases blood pressure.  · Helps control blood lipids (such as cholesterol and triglycerides).  · Improves the body's use of the hormone insulin by:  ¨ Increasing the body's insulin sensitivity.  ¨ Reducing the body's insulin needs.  · Decreases the risk for heart disease because exercising:  ¨ Lowers cholesterol and triglycerides levels.  ¨ Increases the levels of good  cholesterol (such as high-density lipoproteins [HDL]) in the body.  ¨ Lowers blood glucose levels.  YOUR ACTIVITY PLAN   Choose an activity that you enjoy, and set realistic goals. To exercise safely, you should begin practicing any new physical activity slowly, and gradually increase the intensity of the exercise over time. Your health care provider or diabetes educator can help create an activity plan that works for you. General recommendations include:  · Encouraging children to engage in at least 60 minutes of physical activity each day.  · Stretching and performing strength training exercises, such as yoga or weight lifting, at least 2 times per week.  · Performing a total of at least 150 minutes of moderate-intensity exercise each week, such as brisk walking or water aerobics.  · Exercising at least 3 days per week, making sure you allow no more than 2 consecutive days to pass without exercising.  · Avoiding long periods of inactivity (90 minutes or more). When you have to spend an extended period of time sitting down, take frequent breaks to walk or stretch.  RECOMMENDATIONS FOR EXERCISING WITH TYPE 1 OR TYPE 2 DIABETES   · Check your blood glucose before exercising. If blood glucose levels are greater than 240 mg/dL, check for urine ketones. Do not exercise if ketones are present.  · Avoid injecting insulin into areas of the body that are going to be exercised. For example, avoid injecting insulin into:  ¨ The arms when playing tennis.  ¨ The legs when jogging.  · Keep a record of:  ¨ Food intake before and after you exercise.  ¨ Expected peak times of insulin action.  ¨ Blood glucose levels before and after you exercise.  ¨ The type and amount of exercise you have done.  · Review your records with your health care provider. Your health care provider will help you to develop guidelines for adjusting food intake and insulin amounts before and after exercising.  · If you take insulin or oral hypoglycemic  agents, watch for signs and symptoms of hypoglycemia. They include:  ¨ Dizziness.  ¨ Shaking.  ¨ Sweating.  ¨ Chills.  ¨ Confusion.  · Drink plenty of water while you exercise to prevent dehydration or heat stroke. Body water is lost during exercise and must be replaced.  · Talk to your health care provider before starting an exercise program to make sure it is safe for you. Remember, almost any type of activity is better than none.     This information is not intended to replace advice given to you by your health care provider. Make sure you discuss any questions you have with your health care provider.     Document Released: 03/09/2005 Document Revised: 05/03/2016 Document Reviewed: 05/27/2014  [employer] Interactive Patient Education ©2016 [employer]            MyChart Status: Patient Declined

## 2017-07-11 NOTE — TELEPHONE ENCOUNTER
Last seen: 06/22/17 by Dr. Jasmine  Next appt: 07/25/17 with Dr. Newsome    Was the patient seen in the last year in this department? Yes   Does patient have an active prescription for medications requested? No   Received Request Via: Pharmacy

## 2017-07-14 RX ORDER — PEN NEEDLE, DIABETIC 31 GX5/16"
NEEDLE, DISPOSABLE MISCELLANEOUS
Qty: 100 EACH | Refills: 0 | Status: SHIPPED | OUTPATIENT
Start: 2017-07-14 | End: 2018-11-12

## 2017-07-15 ENCOUNTER — HOSPITAL ENCOUNTER (OUTPATIENT)
Dept: LAB | Facility: MEDICAL CENTER | Age: 62
End: 2017-07-15
Attending: GENERAL ACUTE CARE HOSPITAL
Payer: MEDICAID

## 2017-07-15 DIAGNOSIS — E11.65 UNCONTROLLED TYPE 2 DIABETES MELLITUS WITH COMPLICATION, UNSPECIFIED LONG TERM INSULIN USE STATUS: ICD-10-CM

## 2017-07-15 DIAGNOSIS — E11.8 UNCONTROLLED TYPE 2 DIABETES MELLITUS WITH COMPLICATION, UNSPECIFIED LONG TERM INSULIN USE STATUS: ICD-10-CM

## 2017-07-15 LAB
EST. AVERAGE GLUCOSE BLD GHB EST-MCNC: 283 MG/DL
HBA1C MFR BLD: 11.5 % (ref 0–5.6)

## 2017-07-15 PROCEDURE — 83036 HEMOGLOBIN GLYCOSYLATED A1C: CPT

## 2017-07-15 PROCEDURE — 36415 COLL VENOUS BLD VENIPUNCTURE: CPT

## 2017-07-25 ENCOUNTER — OFFICE VISIT (OUTPATIENT)
Dept: INTERNAL MEDICINE | Facility: MEDICAL CENTER | Age: 62
End: 2017-07-25
Payer: MEDICAID

## 2017-07-25 VITALS
OXYGEN SATURATION: 96 % | HEART RATE: 57 BPM | TEMPERATURE: 97.5 F | BODY MASS INDEX: 26.12 KG/M2 | SYSTOLIC BLOOD PRESSURE: 122 MMHG | HEIGHT: 64 IN | WEIGHT: 153 LBS | DIASTOLIC BLOOD PRESSURE: 84 MMHG

## 2017-07-25 DIAGNOSIS — M79.7 FIBROMYALGIA: ICD-10-CM

## 2017-07-25 DIAGNOSIS — R74.8 INCREASED VITAMIN B12 LEVEL: ICD-10-CM

## 2017-07-25 DIAGNOSIS — E78.5 DYSLIPIDEMIA: ICD-10-CM

## 2017-07-25 DIAGNOSIS — E11.65 UNCONTROLLED TYPE 2 DIABETES MELLITUS WITH COMPLICATION, UNSPECIFIED LONG TERM INSULIN USE STATUS: ICD-10-CM

## 2017-07-25 DIAGNOSIS — Z72.0 TOBACCO USE: ICD-10-CM

## 2017-07-25 DIAGNOSIS — K21.9 GASTROESOPHAGEAL REFLUX DISEASE WITHOUT ESOPHAGITIS: ICD-10-CM

## 2017-07-25 DIAGNOSIS — F41.9 ANXIETY: ICD-10-CM

## 2017-07-25 DIAGNOSIS — I10 ESSENTIAL HYPERTENSION, BENIGN: ICD-10-CM

## 2017-07-25 DIAGNOSIS — E11.8 UNCONTROLLED TYPE 2 DIABETES MELLITUS WITH COMPLICATION, UNSPECIFIED LONG TERM INSULIN USE STATUS: ICD-10-CM

## 2017-07-25 PROCEDURE — 99214 OFFICE O/P EST MOD 30 MIN: CPT | Performed by: INTERNAL MEDICINE

## 2017-07-25 ASSESSMENT — PAIN SCALES - GENERAL: PAINLEVEL: NO PAIN

## 2017-07-25 NOTE — PROGRESS NOTES
"      Mary Moore is a 61 y.o. female who is here to establish care with me. She was a patient of Dr. Kenroy rios who has now graduated.     CC: Follow up on DM 2    HPI:    Mary is a 61 year old female with a past medical history of DM type 2, B12 def, HTN, Anxiety, Fibromyalgia, and Tobacco abuse who presents to the clinic today for follow up on her DM. Patient had an A1C in the office which was 11 during her previous visit. It was thought it might be an error so it was re-checked in the lab and was 11.5. Noted that from 9/2016, her HbA1C was 7.0. Patient was initially on Metformin 1000mg BID, Insulin, and Glipizide. During the past months, her Metformin dose was decreased in half and she was taken off insulin. Patient was having dizziness and didn't tolerate the metformin at a higher dose. She was on Insulin 20 U. She feels since that change, her DM has been out of control. She has not been checking her glucose levels at home because her glucometer doesn't work according to the patient. She is quite anxious about her DM and says she \"doesn't know what to do.\" Says the diabetic education classes are not covered by her insurance but she is aware of the food she is supposed to avoid. Also says that she has been eating more lately but there are no other significant changes in her lifestyle.     Ankle Swollen: Noticed that her ankles were swollen over the weekend. Lasted about 3 days and went away on its own. She didn't try anything specific for it but noticed that by Sunday, her ankles were better. No SOB, chest pain, difficulty in breathing, orthopnea.    Tobacco abuse: Had quit earlier but has relapses. She has not smoked for about 5 days now but went 3 weeks in between when she had quit. Says she is trying and working on it.    Anxiety: Patient feels her anxiety has worsened over time. Would like to speak with a therapist/psychiatrist about this as she is not able to focus or do things as efficiently as " before.     B12: Levels were high and the dosage of her B12 was decreased.    Healthcare maintenance: Uptodate on PAP smear done this past year, mammogram was ordered but patient wants another print out. Colonoscopy done in the past 5 years.       No problem-specific assessment & plan notes found for this encounter.      She  has a past medical history of Insomnia; Hyperlipidemia; Hypertension; Multiple pulmonary nodules (6/23/2016); Fibromyalgia (6/23/2016); Constipation (6/23/2016); Chest pain (6/23/2016); Hypercalcemia (6/23/2016); Urinary incontinence (6/23/2016); Epigastric pain (6/23/2016); GERD (gastroesophageal reflux disease) (6/23/2016); Lentigo (6/23/2016); Lupus erythematosus (6/23/2016); Dyslipidemia (6/23/2016); HTN (hypertension) (6/23/2016); Anxiety (6/23/2016); Vitamin D deficiency (6/23/2016); and Facial rash (6/23/2016). She also has no past medical history of Breast cancer (CMS-HCC).    Patient Active Problem List    Diagnosis Date Noted   • Adult respiratory stress syndrome (CMS-[facility]) 06/02/2017   • Precordial pain 06/02/2017   • Screening mammogram, encounter for 11/29/2016   • Essential hypertension, benign 11/14/2016   • Coronary artery disease, non-occlusive 11/14/2016   • Tobacco use 08/18/2016   • History of lupus 08/18/2016   • Vitamin B12 deficiency 08/15/2016   • Multiple pulmonary nodules 06/23/2016   • Fibromyalgia 06/23/2016   • GERD (gastroesophageal reflux disease) 06/23/2016   • Dyslipidemia 06/23/2016   • Insomnia 06/23/2016   • Anxiety 06/23/2016   • Vitamin D deficiency 06/23/2016   • Diabetes type 2, uncontrolled (CMS-HCC) 09/21/2010       Allergies:Review of patient's allergies indicates no known allergies.    Current Outpatient Prescriptions   Medication Sig Dispense Refill   • insulin glargine (BASAGLAR KWIKPEN) 100 UNIT/ML Solution Pen-injector injection Inject 30 Units as instructed every evening. 3 PEN 6   • glucose monitoring kit (FREESTYLE) monitoring kit 1 Each by Does  not apply route Once for 1 dose. 1 Kit 0   • B-D ULTRAFINE III SHORT PEN 31G X 8 MM Misc USE WITH INSULIN AS INSTRUCTED ONCE A  Each 0   • cyanocobalamin (VITAMIN B12) 1000 MCG Tab Take 1 Tab by mouth every day. 60 Tab 1   • lisinopril (PRINIVIL) 10 MG Tab Take 1 Tab by mouth every day. 30 Tab 3   • duloxetine (CYMBALTA) 60 MG Cap DR Particles delayed-release capsule Take 1 Cap by mouth every day. 30 Cap 0   • trazodone (DESYREL) 50 MG Tab TAKE 1 TABLET BY MOUTH EVERY BEDTIME. 90 Tab 0   • metformin (GLUCOPHAGE) 1000 MG tablet Take 1 Tab by mouth every day. 30 Tab 6   • atorvastatin (LIPITOR) 40 MG Tab Take 1 Tab by mouth every day. 30 Tab 5   • metoprolol (LOPRESSOR) 25 MG Tab Take 0.5 Tabs by mouth 2 times a day. 120 Tab 3   • Lancet Devices (TRUEDRAW LANCING DEVICE) Misc by Does not apply route.     • aspirin 81 MG tablet Take 81 mg by mouth every morning. Starting 9/26      • metformin (GLUCOPHAGE) 1000 MG tablet TAKE 1 TABLET BY MOUTH 2 TIMES A DAY, WITH MEALS. 180 Tab 0     No current facility-administered medications for this visit.       Social History   Substance Use Topics   • Smoking status: Former Smoker -- 0.50 packs/day for 40 years     Types: Cigarettes     Quit date: 04/01/2017   • Smokeless tobacco: Never Used      Comment: 1/2 ppd - 1ppd    • Alcohol Use: No       Family History   Problem Relation Age of Onset   • Heart Disease Father    • Stroke Father        Review of Systems:   Constitutional ROS: No unexpected change in weight, No weakness, No unexplained fevers, sweats, or chills  Eye ROS: No recent significant change in vision  Ear ROS: No ear pain  Neck ROS: No lumps or masses  Pulmonary ROS: No dyspnea on exertion, No wheezing, No shortness of breath, No recent change in breathing  Cardiovascular ROS: No exercise intolerance, No chest pain, No palpitations  Gastrointestinal ROS: No abdominal pain, No change in bowel habits, No nausea, vomiting, diarrhea, or  "constipation  Musculoskeletal/Extremities ROS: Positive for swelling in the lower extremities up to the ankles.   Skin/Integumentary ROS: color, texture and temperature normal  Neurologic ROS: No weakness  Psychiatric ROS: Positive for anxiety.  Otherwise, rest of the ROS are negative.     Physical Exam:  Blood pressure 122/84, pulse 57, temperature 36.4 °C (97.5 °F), height 1.626 m (5' 4.02\"), weight 69.4 kg (153 lb), SpO2 96 %. Body mass index is 26.25 kg/(m^2).    General Appearance: healthy, alert, no distress, cooperative  Skin: Skin color, texture, turgor normal. No rashes or lesions.  Head: Normocephalic. No masses appreciated.   Eyes: conjunctivae/corneas clear. PERRLA.  Ears: External ears normal.  Nose/Sinuses: Nares normal. Mucosa normal. No drainage or sinus tenderness.  Oropharynx: Lips, mucosa, and tongue normal. Oropharynx moist and without lesion.  Lungs: Percussion normal. Lungs clear to auscultation bilaterally.  Heart: RRR without murmur, gallop, or rubs.  Abdomen: Soft, non-tender. BS normal. No masses,  No organomegaly  Musculoskeletal: Extremities: Upper and lower extremities appear normal. No deformities, edema.   Peripheral Pulses: Pulses: radial=4/4, dorsalis pedis=4/4  Neurologic: Cranial nerves intact.   Psychiatric: Somewhat depressed mood, Appears anxious at times.     Assessment/Plan:     1. Uncontrolled type 2 diabetes mellitus with complication, unspecified long term insulin use status (CMS-HCC): Worsening  - Will increase dose of her Insulin from 20 U to 30 U.  - Patient has not tolerated a higher dose of Metformin than the 1000mg daily.   - Will re-check HbA1C before next visit.  - Concerning about the significant increase in the span of about 9 months or so.   - Patient follows up with Ophthalmologist.   - insulin glargine (BASAGLAR KWIKPEN) 100 UNIT/ML Solution Pen-injector injection; Inject 30 Units as instructed every evening.  Dispense: 3 PEN; Refill: 6  - HBA1C, FUTURE    2. " Fibromyalgia: Stable  - Has not seen her Rheumatologist.  - Was on Cymbalta but states she is not taking it currently.  - Will monitor.     3. Gastroesophageal reflux disease without esophagitis: Stable  - No issues at current visit. Off her PPI since the last time she was seen.     4. Dyslipidemia: Stable  - Last lipid panel within this past year showed LDL of 49.   - Will continue on Statin for now because of increased ASCVD.     5. Anxiety: Worsening  - Anxiety has gotten worse recently.  - Would like to speak with a therapist/psychiatrist.   - REFERRAL TO PSYCHIATRY    6. Tobacco use: worsening  - Advised patient the importance of smoking cessation especially considering her other co-morbidities.  - Patient states she is trying her best and has not smoked for the past 5 days.  - Emphasized that the best thing she can do right now is to quit and not start again. Patient expressed understanding.     7. Essential hypertension, benign: Stable  - BP today, 122/84. Well controlled.   - Continue Lisinopril at 10mg daily. Patient was confused between 20mg or 10mg.     8. Increased vitamin B12 level  - Will re-check B12 before next visit to make sure it is not increased as she has a history of vit B12 def.   - VITAMIN B12, FUTURE      Followup: Return in about 3 months (around 10/25/2017) for Diabetes management.

## 2017-07-25 NOTE — PATIENT INSTRUCTIONS
Get your lab work done before your next visit with me.  Schedule an appointment in 3 months to go over your diabetes.       Diabetes and Exercise  Exercising regularly is important. It is not just about losing weight. It has many health benefits, such as:  · Improving your overall fitness, flexibility, and endurance.  · Increasing your bone density.  · Helping with weight control.  · Decreasing your body fat.  · Increasing your muscle strength.  · Reducing stress and tension.  · Improving your overall health.  People with diabetes who exercise gain additional benefits because exercise:  · Reduces appetite.  · Improves the body's use of blood sugar (glucose).  · Helps lower or control blood glucose.  · Decreases blood pressure.  · Helps control blood lipids (such as cholesterol and triglycerides).  · Improves the body's use of the hormone insulin by:  ¨ Increasing the body's insulin sensitivity.  ¨ Reducing the body's insulin needs.  · Decreases the risk for heart disease because exercising:  ¨ Lowers cholesterol and triglycerides levels.  ¨ Increases the levels of good cholesterol (such as high-density lipoproteins [HDL]) in the body.  ¨ Lowers blood glucose levels.  YOUR ACTIVITY PLAN   Choose an activity that you enjoy, and set realistic goals. To exercise safely, you should begin practicing any new physical activity slowly, and gradually increase the intensity of the exercise over time. Your health care provider or diabetes educator can help create an activity plan that works for you. General recommendations include:  · Encouraging children to engage in at least 60 minutes of physical activity each day.  · Stretching and performing strength training exercises, such as yoga or weight lifting, at least 2 times per week.  · Performing a total of at least 150 minutes of moderate-intensity exercise each week, such as brisk walking or water aerobics.  · Exercising at least 3 days per week, making sure you allow no more  than 2 consecutive days to pass without exercising.  · Avoiding long periods of inactivity (90 minutes or more). When you have to spend an extended period of time sitting down, take frequent breaks to walk or stretch.  RECOMMENDATIONS FOR EXERCISING WITH TYPE 1 OR TYPE 2 DIABETES   · Check your blood glucose before exercising. If blood glucose levels are greater than 240 mg/dL, check for urine ketones. Do not exercise if ketones are present.  · Avoid injecting insulin into areas of the body that are going to be exercised. For example, avoid injecting insulin into:  ¨ The arms when playing tennis.  ¨ The legs when jogging.  · Keep a record of:  ¨ Food intake before and after you exercise.  ¨ Expected peak times of insulin action.  ¨ Blood glucose levels before and after you exercise.  ¨ The type and amount of exercise you have done.  · Review your records with your health care provider. Your health care provider will help you to develop guidelines for adjusting food intake and insulin amounts before and after exercising.  · If you take insulin or oral hypoglycemic agents, watch for signs and symptoms of hypoglycemia. They include:  ¨ Dizziness.  ¨ Shaking.  ¨ Sweating.  ¨ Chills.  ¨ Confusion.  · Drink plenty of water while you exercise to prevent dehydration or heat stroke. Body water is lost during exercise and must be replaced.  · Talk to your health care provider before starting an exercise program to make sure it is safe for you. Remember, almost any type of activity is better than none.     This information is not intended to replace advice given to you by your health care provider. Make sure you discuss any questions you have with your health care provider.     Document Released: 03/09/2005 Document Revised: 05/03/2016 Document Reviewed: 05/27/2014  [employer] Interactive Patient Education ©2016 [employer]

## 2017-07-25 NOTE — MR AVS SNAPSHOT
"        Mary Byers Oscar   2017 3:00 PM   Office Visit   MRN: [id number]    Department:  [facility]   Dept Phone:  [phone]    Description:  Female : 1955   Provider:  Miki Newsome M.D.           Reason for Visit     Establish Care new to you    Follow-Up fu labs     Foot Swelling bilateral ankle swollen on weekend      Allergies as of 2017     No Known Allergies      You were diagnosed with     Uncontrolled type 2 diabetes mellitus with complication, unspecified long term insulin use status (CMS-HCC)   [3495713]       Fibromyalgia   [544760]       Gastroesophageal reflux disease without esophagitis   [448120]       Dyslipidemia   [619771]       Anxiety   [022660]       Tobacco use   [077960]       Essential hypertension, benign   [401.1.ICD-9-CM]       Increased vitamin B12 level   [9399566]         Vital Signs     Blood Pressure Pulse Temperature Height Weight Body Mass Index    122/84 mmHg 57 36.4 °C (97.5 °F) 1.626 m (5' 4.02\") 69.4 kg (153 lb) 26.25 kg/m2    Oxygen Saturation Smoking Status                96% Former Smoker          Basic Information     Date Of Birth Sex Race Ethnicity Preferred Language    1955 Female White Non- English      Problem List              ICD-10-CM Priority Class Noted - Resolved    Diabetes type 2, uncontrolled (CMS-HCC) E11.65   2010 - Present    Multiple pulmonary nodules R91.8   2016 - Present    Fibromyalgia M79.7   2016 - Present    GERD (gastroesophageal reflux disease) K21.9   2016 - Present    Dyslipidemia E78.5   2016 - Present    Insomnia G47.00   2016 - Present    Anxiety F41.9   2016 - Present    Vitamin D deficiency E55.9   2016 - Present    Vitamin B12 deficiency E53.8   8/15/2016 - Present    Tobacco use Z72.0   2016 - Present    History of lupus Z87.39   2016 - Present    Essential hypertension, benign I10   2016 - Present    Coronary artery disease, non-occlusive " I25.10   11/14/2016 - Present    Screening mammogram, encounter for Z12.31   11/29/2016 - Present    Adult respiratory stress syndrome (CMS-HCC) J80   6/2/2017 - Present    Precordial pain R07.2   6/2/2017 - Present      Health Maintenance        Date Due Completion Dates    IMM HEP B VACCINE (1 of 3 - Primary Series) 1955 ---    DIABETES MONOFILAMENT / LE EXAM 3/23/1956 ---    IMM DTaP/Tdap/Td Vaccine (1 - Tdap) 9/23/1975 ---    IMM ZOSTER VACCINE 9/23/2016 ---    IMM PNEUMOCOCCAL 19-64 (ADULT) MEDIUM RISK SERIES (1 of 1 - PPSV23) 1/24/2017 11/29/2016    MAMMOGRAM 2/1/2017 2/1/2016, 5/6/2013    RETINAL SCREENING 6/23/2017 6/23/2016    SERUM CREATININE 8/25/2017 8/25/2016, 7/2/2016, 3/8/2016, 3/2/2016, 1/21/2016, 1/14/2016, 8/26/2013, 9/22/2010, 9/21/2010, 9/20/2010, 5/8/2009    IMM INFLUENZA (1) 9/1/2017 11/22/2016, 10/9/2014    FASTING LIPID PROFILE 11/28/2017 11/28/2016, 9/21/2010, 5/8/2009    A1C SCREENING 1/15/2018 7/15/2017, 6/22/2017, 9/12/2016, 7/2/2016, 9/21/2010, 5/8/2009    URINE ACR / MICROALBUMIN 3/11/2018 3/11/2017    PAP SMEAR 4/11/2020 4/11/2017    COLONOSCOPY 2/1/2027 2/1/2017 (N/S)    Override on 2/1/2017: (N/S) (PER [facility] AND C NO COLONOSCOPY)            Current Immunizations     13-VALENT PCV PREVNAR 11/29/2016    Influenza Vaccine Quad Inj (Preserved) 11/22/2016, 10/9/2014      Below and/or attached are the medications your provider expects you to take. Review all of your home medications and newly ordered medications with your provider and/or pharmacist. Follow medication instructions as directed by your provider and/or pharmacist. Please keep your medication list with you and share with your provider. Update the information when medications are discontinued, doses are changed, or new medications (including over-the-counter products) are added; and carry medication information at all times in the event of emergency situations     Allergies:  No Known Allergies          Medications  Valid  as of: July 25, 2017 -  5:36 PM    Generic Name Brand Name Tablet Size Instructions for use    Aspirin (Tab) aspirin 81 MG Take 81 mg by mouth every morning. Starting 9/26         Atorvastatin Calcium (Tab) LIPITOR 40 MG Take 1 Tab by mouth every day.        Blood Glucose Monitoring Suppl (Kit) FREESTYLE  1 Each by Does not apply route Once for 1 dose.        Cyanocobalamin (Tab) VITAMIN B12 1000 MCG Take 1 Tab by mouth every day.        DULoxetine HCl (Cap DR Particles) CYMBALTA 60 MG Take 1 Cap by mouth every day.        Insulin Glargine (Solution Pen-injector) LANTUS 100 UNIT/ML Inject 30 Units as instructed every evening.        Insulin Pen Needle (Misc) B-D ULTRAFINE III SHORT PEN 31G X 8 MM USE WITH INSULIN AS INSTRUCTED ONCE A DAY        Lancet Devices (Mis) TRUEDRAW LANCING DEVICE  by Does not apply route.        Lisinopril (Tab) PRINIVIL 10 MG Take 1 Tab by mouth every day.        MetFORMIN HCl (Tab) GLUCOPHAGE 1000 MG Take 1 Tab by mouth every day.        MetFORMIN HCl (Tab) GLUCOPHAGE 1000 MG TAKE 1 TABLET BY MOUTH 2 TIMES A DAY, WITH MEALS.        Metoprolol Tartrate (Tab) LOPRESSOR 25 MG Take 0.5 Tabs by mouth 2 times a day.        TraZODone HCl (Tab) DESYREL 50 MG TAKE 1 TABLET BY MOUTH EVERY BEDTIME.        .                 Medicines prescribed today were sent to:     [facility]Y #[id number] - [place], [place] - [address] [place] [id number]    Phone: [phone] Fax: [phone]    Open 24 Hours?: No      Medication refill instructions:       If your prescription bottle indicates you have medication refills left, it is not necessary to call your provider’s office. Please contact your pharmacy and they will refill your medication.    If your prescription bottle indicates you do not have any refills left, you may request refills at any time through one of the following ways: The online [employer] system (except Urgent Care), by calling your provider’s office, or by asking your pharmacy to  contact your provider’s office with a refill request. Medication refills are processed only during regular business hours and may not be available until the next business day. Your provider may request additional information or to have a follow-up visit with you prior to refilling your medication.   *Please Note: Medication refills are assigned a new Rx number when refilled electronically. Your pharmacy may indicate that no refills were authorized even though a new prescription for the same medication is available at the pharmacy. Please request the medicine by name with the pharmacy before contacting your provider for a refill.        Your To Do List     Future Labs/Procedures Complete By Expires    HEMOGLOBIN A1C  As directed 7/25/2018    VITAMIN B12  As directed 7/25/2018      Referral     A referral request has been sent to our patient care coordination department. Please allow 3-5 business days for us to process this request and contact you either by phone or mail. If you do not hear from us by the 5th business day, please call us at [phone].        Instructions    Get your lab work done before your next visit with me.  Schedule an appointment in 3 months to go over your diabetes.       Diabetes and Exercise  Exercising regularly is important. It is not just about losing weight. It has many health benefits, such as:  · Improving your overall fitness, flexibility, and endurance.  · Increasing your bone density.  · Helping with weight control.  · Decreasing your body fat.  · Increasing your muscle strength.  · Reducing stress and tension.  · Improving your overall health.  People with diabetes who exercise gain additional benefits because exercise:  · Reduces appetite.  · Improves the body's use of blood sugar (glucose).  · Helps lower or control blood glucose.  · Decreases blood pressure.  · Helps control blood lipids (such as cholesterol and triglycerides).  · Improves the body's use of the hormone  insulin by:  ¨ Increasing the body's insulin sensitivity.  ¨ Reducing the body's insulin needs.  · Decreases the risk for heart disease because exercising:  ¨ Lowers cholesterol and triglycerides levels.  ¨ Increases the levels of good cholesterol (such as high-density lipoproteins [HDL]) in the body.  ¨ Lowers blood glucose levels.  YOUR ACTIVITY PLAN   Choose an activity that you enjoy, and set realistic goals. To exercise safely, you should begin practicing any new physical activity slowly, and gradually increase the intensity of the exercise over time. Your health care provider or diabetes educator can help create an activity plan that works for you. General recommendations include:  · Encouraging children to engage in at least 60 minutes of physical activity each day.  · Stretching and performing strength training exercises, such as yoga or weight lifting, at least 2 times per week.  · Performing a total of at least 150 minutes of moderate-intensity exercise each week, such as brisk walking or water aerobics.  · Exercising at least 3 days per week, making sure you allow no more than 2 consecutive days to pass without exercising.  · Avoiding long periods of inactivity (90 minutes or more). When you have to spend an extended period of time sitting down, take frequent breaks to walk or stretch.  RECOMMENDATIONS FOR EXERCISING WITH TYPE 1 OR TYPE 2 DIABETES   · Check your blood glucose before exercising. If blood glucose levels are greater than 240 mg/dL, check for urine ketones. Do not exercise if ketones are present.  · Avoid injecting insulin into areas of the body that are going to be exercised. For example, avoid injecting insulin into:  ¨ The arms when playing tennis.  ¨ The legs when jogging.  · Keep a record of:  ¨ Food intake before and after you exercise.  ¨ Expected peak times of insulin action.  ¨ Blood glucose levels before and after you exercise.  ¨ The type and amount of exercise you have  done.  · Review your records with your health care provider. Your health care provider will help you to develop guidelines for adjusting food intake and insulin amounts before and after exercising.  · If you take insulin or oral hypoglycemic agents, watch for signs and symptoms of hypoglycemia. They include:  ¨ Dizziness.  ¨ Shaking.  ¨ Sweating.  ¨ Chills.  ¨ Confusion.  · Drink plenty of water while you exercise to prevent dehydration or heat stroke. Body water is lost during exercise and must be replaced.  · Talk to your health care provider before starting an exercise program to make sure it is safe for you. Remember, almost any type of activity is better than none.     This information is not intended to replace advice given to you by your health care provider. Make sure you discuss any questions you have with your health care provider.     Document Released: 03/09/2005 Document Revised: 05/03/2016 Document Reviewed: 05/27/2014  [employer] Patient Education ©2016 [employer]            MyChart Status: Patient Declined

## 2017-07-26 RX ORDER — ATORVASTATIN CALCIUM 40 MG/1
TABLET, FILM COATED ORAL
Qty: 90 TAB | Refills: 0 | Status: SHIPPED | OUTPATIENT
Start: 2017-07-26 | End: 2017-10-23 | Stop reason: SDUPTHER

## 2017-09-02 ENCOUNTER — HOSPITAL ENCOUNTER (OUTPATIENT)
Dept: LAB | Facility: MEDICAL CENTER | Age: 62
End: 2017-09-02
Attending: INTERNAL MEDICINE
Payer: MEDICAID

## 2017-09-02 DIAGNOSIS — R74.8 INCREASED VITAMIN B12 LEVEL: ICD-10-CM

## 2017-09-02 DIAGNOSIS — E11.65 UNCONTROLLED TYPE 2 DIABETES MELLITUS WITH COMPLICATION, UNSPECIFIED LONG TERM INSULIN USE STATUS: ICD-10-CM

## 2017-09-02 DIAGNOSIS — E11.8 UNCONTROLLED TYPE 2 DIABETES MELLITUS WITH COMPLICATION, UNSPECIFIED LONG TERM INSULIN USE STATUS: ICD-10-CM

## 2017-09-02 LAB
EST. AVERAGE GLUCOSE BLD GHB EST-MCNC: 335 MG/DL
HBA1C MFR BLD: 13.3 % (ref 0–5.6)
VIT B12 SERPL-MCNC: >1500 PG/ML (ref 211–911)

## 2017-09-02 PROCEDURE — 36415 COLL VENOUS BLD VENIPUNCTURE: CPT

## 2017-09-02 PROCEDURE — 82607 VITAMIN B-12: CPT

## 2017-09-02 PROCEDURE — 83036 HEMOGLOBIN GLYCOSYLATED A1C: CPT

## 2017-09-11 RX ORDER — TRAZODONE HYDROCHLORIDE 50 MG/1
TABLET ORAL
Qty: 90 TAB | Refills: 0 | Status: SHIPPED | OUTPATIENT
Start: 2017-09-11 | End: 2017-09-12

## 2017-09-11 NOTE — TELEPHONE ENCOUNTER
Last seen: 07/25/17 by Dr. castillo  Next appt: 09/12/17 with Dr. Castillo    Was the patient seen in the last year in this department? Yes   Does patient have an active prescription for medications requested? No   Received Request Via: Pharmacy

## 2017-09-12 ENCOUNTER — OFFICE VISIT (OUTPATIENT)
Dept: INTERNAL MEDICINE | Facility: MEDICAL CENTER | Age: 62
End: 2017-09-12
Payer: MEDICAID

## 2017-09-12 VITALS
SYSTOLIC BLOOD PRESSURE: 110 MMHG | HEART RATE: 86 BPM | HEIGHT: 64 IN | BODY MASS INDEX: 26.46 KG/M2 | TEMPERATURE: 98.6 F | DIASTOLIC BLOOD PRESSURE: 62 MMHG | WEIGHT: 155 LBS | OXYGEN SATURATION: 93 %

## 2017-09-12 DIAGNOSIS — I10 ESSENTIAL HYPERTENSION, BENIGN: ICD-10-CM

## 2017-09-12 DIAGNOSIS — G47.00 INSOMNIA, UNSPECIFIED TYPE: ICD-10-CM

## 2017-09-12 DIAGNOSIS — Z86.39 HX OF DIABETIC NEUROPATHY: ICD-10-CM

## 2017-09-12 DIAGNOSIS — E11.65 UNCONTROLLED TYPE 2 DIABETES MELLITUS WITH COMPLICATION, UNSPECIFIED LONG TERM INSULIN USE STATUS: ICD-10-CM

## 2017-09-12 DIAGNOSIS — R74.8 INCREASED VITAMIN B12 LEVEL: ICD-10-CM

## 2017-09-12 DIAGNOSIS — E11.8 UNCONTROLLED TYPE 2 DIABETES MELLITUS WITH COMPLICATION, UNSPECIFIED LONG TERM INSULIN USE STATUS: ICD-10-CM

## 2017-09-12 PROCEDURE — 90686 IIV4 VACC NO PRSV 0.5 ML IM: CPT | Performed by: INTERNAL MEDICINE

## 2017-09-12 PROCEDURE — 90471 IMMUNIZATION ADMIN: CPT | Performed by: INTERNAL MEDICINE

## 2017-09-12 PROCEDURE — 99214 OFFICE O/P EST MOD 30 MIN: CPT | Mod: 25 | Performed by: INTERNAL MEDICINE

## 2017-09-12 RX ORDER — TRAZODONE HYDROCHLORIDE 100 MG/1
100 TABLET ORAL DAILY
Qty: 30 TAB | Refills: 3 | Status: SHIPPED | OUTPATIENT
Start: 2017-09-12 | End: 2018-11-12

## 2017-09-12 RX ORDER — DIPHENHYDRAMINE HYDROCHLORIDE 25 MG/1
1 CAPSULE, LIQUID FILLED ORAL 4 TIMES DAILY
Qty: 1 KIT | Refills: 0 | Status: SHIPPED | OUTPATIENT
Start: 2017-09-12 | End: 2018-11-12

## 2017-09-12 RX ORDER — PREGABALIN 25 MG/1
25 CAPSULE ORAL 2 TIMES DAILY
Qty: 60 CAP | Refills: 3 | Status: SHIPPED | OUTPATIENT
Start: 2017-09-12 | End: 2017-12-12

## 2017-09-12 NOTE — PROGRESS NOTES
"Mary Moore is a 61 y.o. female here for a non-provider visit for:   FLU    Reason for immunization: Annual Flu Vaccine  Immunization records indicate need for vaccine: Yes, confirmed with Epic  Minimum interval has been met for this vaccine: Yes  ABN completed: Not Indicated    Order and dose verified by: Gertrude ELAM Dated  8/7/2015 was given to patient: Yes  All IAC Questionnaire questions were answered \"No.\"    Patient tolerated injection and no adverse effects were observed or reported: Yes    Pt scheduled for next dose in series: Not Indicated    "

## 2017-09-12 NOTE — PROGRESS NOTES
Mary Moore is a 61 y.o. female who is here for routine follow up and to discuss lab results.    CC: Numbness and tingling in the feet    HPI:    Mary Moore is a 61 year old female who presents today for a routine follow up.    DM type 2/neuropathy: Her Hba1C from June 2017 was 11.3. She was seen by me in the office in July, her insulin dose was increased from 20 U to 30 U of the Lantus. She then had a re-check of her HbA1C which came back at 13.3. She is here today for follow up. Patient had been taking Metformin 1000mg Daily but was at some point supposed to take BID. She has only been taking it once daily. Patient says she recently had an eye exam which was normal. She complains of nerve pain in her lower extremities, more so on the right than the left. Says it is extremely bothersome, she cannot feel her feet at times. It also wakes her up from sleep and has been ongoing for a while now. She has tried Gabapentin which has not worked well for her as it caused Dizziness and confusion. She was also on Cymbalta by her Rheumatologist. Doesn't take it any longer and feels it was not working.     Increased vitamin B12: B12 re-check continued to show >1500. Advised to stop taking the B12.     Insomnia: Trazodone helps. Has been on it in the past. Was on 50mg nightly. Says she has some from before but it has not been helping her sleep and with the nerve pain, it becomes extremely difficult for her to go back to sleep. This happens around 2-3 AM. Requesting a higher dose of the Trazodone.       No problem-specific Assessment & Plan notes found for this encounter.      She  has a past medical history of Anxiety (6/23/2016); Chest pain (6/23/2016); Constipation (6/23/2016); Dyslipidemia (6/23/2016); Epigastric pain (6/23/2016); Facial rash (6/23/2016); Fibromyalgia (6/23/2016); GERD (gastroesophageal reflux disease) (6/23/2016); HTN (hypertension) (6/23/2016); Hypercalcemia (6/23/2016); Hyperlipidemia;  Hypertension; Insomnia; Lentigo (6/23/2016); Lupus erythematosus (6/23/2016); Multiple pulmonary nodules (6/23/2016); Urinary incontinence (6/23/2016); and Vitamin D deficiency (6/23/2016). She also has no past medical history of Breast cancer (CMS-HCC).    Patient Active Problem List    Diagnosis Date Noted   • Adult respiratory stress syndrome (CMS-HCC) 06/02/2017   • Precordial pain 06/02/2017   • Screening mammogram, encounter for 11/29/2016   • Essential hypertension, benign 11/14/2016   • Coronary artery disease, non-occlusive 11/14/2016   • Tobacco use 08/18/2016   • History of lupus 08/18/2016   • Vitamin B12 deficiency 08/15/2016   • Multiple pulmonary nodules 06/23/2016   • Fibromyalgia 06/23/2016   • GERD (gastroesophageal reflux disease) 06/23/2016   • Dyslipidemia 06/23/2016   • Insomnia 06/23/2016   • Anxiety 06/23/2016   • Vitamin D deficiency 06/23/2016   • Diabetes type 2, uncontrolled (CMS-HCC) 09/21/2010       Allergies:Review of patient's allergies indicates no known allergies.    Current Outpatient Prescriptions   Medication Sig Dispense Refill   • trazodone (DESYREL) 100 MG Tab Take 1 Tab by mouth every day. 30 Tab 3   • pregabalin (LYRICA) 25 MG Cap Take 1 Cap by mouth 2 times a day. 60 Cap 3   • Blood Glucose Monitoring Suppl (BLOOD GLUCOSE MONITOR SYSTEM) w/Device Kit 1 Device by Does not apply route 4 times a day. 1 Kit 0   • atorvastatin (LIPITOR) 40 MG Tab TAKE 1 TAB BY MOUTH EVERY DAY. 90 Tab 0   • insulin glargine (BASAGLAR KWIKPEN) 100 UNIT/ML Solution Pen-injector injection Inject 30 Units as instructed every evening. (Patient taking differently: Inject 35 Units as instructed every evening.) 3 PEN 6   • B-D ULTRAFINE III SHORT PEN 31G X 8 MM Misc USE WITH INSULIN AS INSTRUCTED ONCE A  Each 0   • lisinopril (PRINIVIL) 10 MG Tab Take 1 Tab by mouth every day. 30 Tab 3   • metformin (GLUCOPHAGE) 1000 MG tablet Take 1 Tab by mouth every day. (Patient taking differently: Take 1,000  "mg by mouth 2 times a day.) 30 Tab 6   • metoprolol (LOPRESSOR) 25 MG Tab Take 0.5 Tabs by mouth 2 times a day. 120 Tab 3   • aspirin 81 MG tablet Take 81 mg by mouth every morning. Starting 9/26      • Lancet Devices (TRUEDRAW LANCING DEVICE) Misc by Does not apply route.       No current facility-administered medications for this visit.        Social History   Substance Use Topics   • Smoking status: Former Smoker     Packs/day: 0.50     Years: 40.00     Types: Cigarettes     Quit date: 4/1/2017   • Smokeless tobacco: Never Used      Comment: 1/2 ppd - 1ppd    • Alcohol use No       Family History   Problem Relation Age of Onset   • Heart Disease Father    • Stroke Father      Review of Systems:     Pertinent positives as stated in HPI, all others reviewed as negative.    Physical Exam:  Blood pressure 110/62, pulse 86, temperature 37 °C (98.6 °F), height 1.626 m (5' 4.02\"), weight 70.3 kg (155 lb), SpO2 93 %. Body mass index is 26.59 kg/m².    General Appearance: healthy, alert, no distress, cooperative  Skin: Skin color, texture, turgor normal. No rashes or lesions.  Head: Normocephalic. No masses appreciated.   Eyes: conjunctivae/corneas clear. PERRLA.  Lungs: Percussion normal. Lungs clear to auscultation bilaterally.  Heart: RRR without murmur, gallop, or rubs.  Abdomen: Soft, non-tender. BS normal.   Musculoskeletal: Extremities: Upper and lower extremities appear normal. No deformities, edema.   Monofilament testing: Decreased sensation in both feet. 0/5 on the right and 3/5 on the left.    Peripheral Pulses: Pulses: radial=4/4, dorsalis pedis=4/4.  Psychiatric: Mood appears normal.     Assessment/Plan:     1. Uncontrolled type 2 diabetes mellitus with complication, unspecified long term insulin use status (CMS-[facility])  - HbA1C recently checked showed 13.3 Has been increasing over the past year.   - Recommended increasing Insulin from 30 U to 35 U and taking the Metformin 1000mg BID.   - Advised to get lab " work before next appointment.  - Also ordered glucometer. Advised to check glucose regularly.     - pregabalin (LYRICA) 25 MG Cap; Take 1 Cap by mouth 2 times a day.  Dispense: 60 Cap; Refill: 3  - Flu Quad Inj >3 Year Pre-Filled PF  - Blood Glucose Monitoring Suppl (BLOOD GLUCOSE MONITOR SYSTEM) w/Device Kit; 1 Device by Does not apply route 4 times a day.  Dispense: 1 Kit; Refill: 0  - Diabetic Monofilament Lower Extremity Exam  - COMP METABOLIC PANEL; Future  - HEMOGLOBIN A1C; Future  - LIPID PROFILE; Future  - MICROALBUMIN CREAT RATIO URINE (LAB COLLECT); Future    2. Diabetic Neuropathy  - Was tried on Gabapentin and was on Cymbalta with no relief.  - Will try Lyrica. Advised better control of blood glucose level.     3. Essential hypertension, benign  - BP today, 110/62.  - Continue medications: Lisinopril and Metoprolol.    4. Increased vitamin B12 level  - Has been persistently >1500. Advised to stop the medication.  - Has not helped with the neuropathy in any way and there is no longer a deficiency.     5. Insomnia, unspecified type  - Was on Trazodone from previous provider.  - The dose of 50mg is not helping. Will increase the dose for now.  - Anticipate improvement in insomnia with better control of the Diabetes and Neuropathy.   - trazodone (DESYREL) 100 MG Tab; Take 1 Tab by mouth every day.  Dispense: 30 Tab; Refill: 3      Followup: Return in about 3 months (around 12/12/2017).

## 2017-09-12 NOTE — PATIENT INSTRUCTIONS
Your insulin dose has been increased from 30 U to 35 U.  Please take the Metformin twice a day.  You were given Lyrica for Neuropathic pain.  Get lab work done before your next visit (try to get it done as closest to your next visit as possible).   You were also given the Flu vaccine today.

## 2017-10-09 NOTE — TELEPHONE ENCOUNTER
Last seen: 9/12/2017 by Dr. Newsome  Next appt: 12/12/2017 with Dr. Newsome    Was the patient seen in the last year in this department? Yes   Does patient have an active prescription for medications requested? No   Received Request Via: Pharmacy

## 2017-10-23 RX ORDER — ATORVASTATIN CALCIUM 40 MG/1
TABLET, FILM COATED ORAL
Qty: 90 TAB | Refills: 0 | Status: SHIPPED | OUTPATIENT
Start: 2017-10-23 | End: 2018-02-20 | Stop reason: SDUPTHER

## 2017-10-23 NOTE — TELEPHONE ENCOUNTER
Last seen: 09/12/17 by Dr. Newsome  Next appt: 12/12/17 with Dr. Newsome    Was the patient seen in the last year in this department? Yes   Does patient have an active prescription for medications requested? No   Received Request Via: Pharmacy

## 2017-11-10 RX ORDER — LISINOPRIL 20 MG/1
TABLET ORAL
Qty: 90 TAB | Refills: 0 | Status: SHIPPED | OUTPATIENT
Start: 2017-11-10 | End: 2018-03-13

## 2017-12-06 ENCOUNTER — HOSPITAL ENCOUNTER (OUTPATIENT)
Dept: LAB | Facility: MEDICAL CENTER | Age: 62
End: 2017-12-06
Attending: INTERNAL MEDICINE
Payer: MEDICAID

## 2017-12-06 DIAGNOSIS — E11.8 UNCONTROLLED TYPE 2 DIABETES MELLITUS WITH COMPLICATION, UNSPECIFIED LONG TERM INSULIN USE STATUS: ICD-10-CM

## 2017-12-06 DIAGNOSIS — E11.65 UNCONTROLLED TYPE 2 DIABETES MELLITUS WITH COMPLICATION, UNSPECIFIED LONG TERM INSULIN USE STATUS: ICD-10-CM

## 2017-12-06 LAB
ALBUMIN SERPL BCP-MCNC: 3.9 G/DL (ref 3.2–4.9)
ALBUMIN/GLOB SERPL: 1.4 G/DL
ALP SERPL-CCNC: 63 U/L (ref 30–99)
ALT SERPL-CCNC: 21 U/L (ref 2–50)
ANION GAP SERPL CALC-SCNC: 7 MMOL/L (ref 0–11.9)
AST SERPL-CCNC: 17 U/L (ref 12–45)
BILIRUB SERPL-MCNC: 0.3 MG/DL (ref 0.1–1.5)
BUN SERPL-MCNC: 21 MG/DL (ref 8–22)
CALCIUM SERPL-MCNC: 8.8 MG/DL (ref 8.5–10.5)
CHLORIDE SERPL-SCNC: 107 MMOL/L (ref 96–112)
CHOLEST SERPL-MCNC: 114 MG/DL (ref 100–199)
CO2 SERPL-SCNC: 24 MMOL/L (ref 20–33)
CREAT SERPL-MCNC: 0.6 MG/DL (ref 0.5–1.4)
CREAT UR-MCNC: 199.8 MG/DL
EST. AVERAGE GLUCOSE BLD GHB EST-MCNC: 235 MG/DL
GFR SERPL CREATININE-BSD FRML MDRD: >60 ML/MIN/1.73 M 2
GLOBULIN SER CALC-MCNC: 2.8 G/DL (ref 1.9–3.5)
GLUCOSE SERPL-MCNC: 217 MG/DL (ref 65–99)
HBA1C MFR BLD: 9.8 % (ref 0–5.6)
HDLC SERPL-MCNC: 44 MG/DL
LDLC SERPL CALC-MCNC: 46 MG/DL
MICROALBUMIN UR-MCNC: 6.3 MG/DL
MICROALBUMIN/CREAT UR: 32 MG/G (ref 0–30)
POTASSIUM SERPL-SCNC: 4.3 MMOL/L (ref 3.6–5.5)
PROT SERPL-MCNC: 6.7 G/DL (ref 6–8.2)
SODIUM SERPL-SCNC: 138 MMOL/L (ref 135–145)
TRIGL SERPL-MCNC: 121 MG/DL (ref 0–149)

## 2017-12-06 PROCEDURE — 36415 COLL VENOUS BLD VENIPUNCTURE: CPT

## 2017-12-06 PROCEDURE — 83036 HEMOGLOBIN GLYCOSYLATED A1C: CPT

## 2017-12-06 PROCEDURE — 82043 UR ALBUMIN QUANTITATIVE: CPT

## 2017-12-06 PROCEDURE — 80061 LIPID PANEL: CPT

## 2017-12-06 PROCEDURE — 80053 COMPREHEN METABOLIC PANEL: CPT

## 2017-12-06 PROCEDURE — 82570 ASSAY OF URINE CREATININE: CPT

## 2017-12-12 ENCOUNTER — OFFICE VISIT (OUTPATIENT)
Dept: INTERNAL MEDICINE | Facility: MEDICAL CENTER | Age: 62
End: 2017-12-12
Payer: MEDICAID

## 2017-12-12 VITALS
WEIGHT: 164 LBS | HEART RATE: 66 BPM | DIASTOLIC BLOOD PRESSURE: 70 MMHG | BODY MASS INDEX: 28 KG/M2 | TEMPERATURE: 97.8 F | SYSTOLIC BLOOD PRESSURE: 120 MMHG | OXYGEN SATURATION: 97 % | HEIGHT: 64 IN

## 2017-12-12 DIAGNOSIS — Z12.39 BREAST CANCER SCREENING: ICD-10-CM

## 2017-12-12 DIAGNOSIS — E78.5 DYSLIPIDEMIA: ICD-10-CM

## 2017-12-12 DIAGNOSIS — E11.65 UNCONTROLLED TYPE 2 DIABETES MELLITUS WITH COMPLICATION, UNSPECIFIED LONG TERM INSULIN USE STATUS: ICD-10-CM

## 2017-12-12 DIAGNOSIS — E11.8 UNCONTROLLED TYPE 2 DIABETES MELLITUS WITH COMPLICATION, UNSPECIFIED LONG TERM INSULIN USE STATUS: ICD-10-CM

## 2017-12-12 DIAGNOSIS — Z79.899 MEDICATION MANAGEMENT: ICD-10-CM

## 2017-12-12 DIAGNOSIS — I10 ESSENTIAL HYPERTENSION, BENIGN: ICD-10-CM

## 2017-12-12 DIAGNOSIS — K21.9 GASTROESOPHAGEAL REFLUX DISEASE WITHOUT ESOPHAGITIS: ICD-10-CM

## 2017-12-12 DIAGNOSIS — G62.9 NEUROPATHY: ICD-10-CM

## 2017-12-12 DIAGNOSIS — Z00.00 ENCOUNTER FOR PREVENTIVE HEALTH EXAMINATION: ICD-10-CM

## 2017-12-12 PROCEDURE — 99214 OFFICE O/P EST MOD 30 MIN: CPT | Performed by: INTERNAL MEDICINE

## 2017-12-12 RX ORDER — AMITRIPTYLINE HYDROCHLORIDE 25 MG/1
25 TABLET, FILM COATED ORAL DAILY
Qty: 30 TAB | Refills: 3 | Status: SHIPPED | OUTPATIENT
Start: 2017-12-12 | End: 2018-11-12

## 2017-12-12 RX ORDER — PANTOPRAZOLE SODIUM 20 MG/1
20 TABLET, DELAYED RELEASE ORAL DAILY
Qty: 30 TAB | Refills: 3 | Status: SHIPPED | OUTPATIENT
Start: 2017-12-12 | End: 2017-12-27

## 2017-12-12 RX ORDER — ARIPIPRAZOLE 2 MG/1
2 TABLET ORAL
Refills: 1 | COMMUNITY
Start: 2017-12-05 | End: 2019-09-27

## 2017-12-12 NOTE — LETTER
December 12, 2017        Mary Moore  [address] [place] [address]        Dear To Whom It May Concern,    Please excuse the patient from work for this week from 12/11/2017-12/15/2017 due to the patient experiencing right leg pain and making it difficult for her to walk.     If you have any questions or concerns, please don't hesitate to call.        Sincerely,        Miki Newsome M.D.    Electronically Signed

## 2017-12-13 NOTE — PROGRESS NOTES
Mary Moore is a 62 y.o. female who is here for routine follow up.    CC: Lab review, follow up on Diabetes mellitus type 2    HPI:    GERD: Patient feels it is worsening. She says she has been having more heartburn now. She is not on any medication but she was on Protonix at some point. Wants to try that again. She denied any dysphagia, weight loss, or any loss of appetite.    DM type 2: Recent Hba1C checked was 9.8. It has decreased from 13.3. Patient is on Insulin 35 U and she is also on metformin. Says she wants to see an endocrinologist. She was offered diabetes education at some point but insurance didn't cover it at that time. Recommended following a low carb diet. She also complains of peripheral neuropathy but denied any fatigue, changes in vision.     Dyslipidemia: Lipid panel TG is 121, HDL 44, LDL 46. She is already on Atorvastatin.  No issues. Advised on diet and exercise.    Essential hypertension: BP stable. On Lisinopril. Patient is not sure of the dose. She is also on Metoprolol. No issues. No vision changes, headaches noted.     Neuropathy: Patient has peripheral neuropathy, worse on the right than the left. It is making it difficult for her to walk. She was on Gabapentin which she didn't tolerate because she had mood changes. She was then placed on Lyrica which the insurance didn't cover. She was tried on Elavil at some point but taken off for unclear reasons. Patient wanting to try that again.       No problem-specific Assessment & Plan notes found for this encounter.      She  has a past medical history of Anxiety (6/23/2016); Chest pain (6/23/2016); Constipation (6/23/2016); Dyslipidemia (6/23/2016); Epigastric pain (6/23/2016); Facial rash (6/23/2016); Fibromyalgia (6/23/2016); GERD (gastroesophageal reflux disease) (6/23/2016); HTN (hypertension) (6/23/2016); Hypercalcemia (6/23/2016); Hyperlipidemia; Hypertension; Insomnia; Lentigo (6/23/2016); Lupus erythematosus (6/23/2016);  Multiple pulmonary nodules (6/23/2016); Urinary incontinence (6/23/2016); and Vitamin D deficiency (6/23/2016). She also has no past medical history of Breast cancer (CMS-HCC).    Patient Active Problem List    Diagnosis Date Noted   • Adult respiratory stress syndrome (CMS-HCC) 06/02/2017   • Precordial pain 06/02/2017   • Screening mammogram, encounter for 11/29/2016   • Essential hypertension, benign 11/14/2016   • Coronary artery disease, non-occlusive 11/14/2016   • Tobacco use 08/18/2016   • History of lupus 08/18/2016   • Vitamin B12 deficiency 08/15/2016   • Multiple pulmonary nodules 06/23/2016   • Fibromyalgia 06/23/2016   • GERD (gastroesophageal reflux disease) 06/23/2016   • Dyslipidemia 06/23/2016   • Insomnia 06/23/2016   • Anxiety 06/23/2016   • Vitamin D deficiency 06/23/2016   • Diabetes type 2, uncontrolled (CMS-HCC) 09/21/2010       Allergies:Patient has no known allergies.    Current Outpatient Prescriptions   Medication Sig Dispense Refill   • aripiprazole (ABILIFY) 2 MG tablet Take 2 mg by mouth.  1   • pantoprazole (PROTONIX) 20 MG tablet Take 1 Tab by mouth every day. 30 Tab 3   • amitriptyline (ELAVIL) 25 MG Tab Take 1 Tab by mouth every day. 30 Tab 3   • lisinopril (PRINIVIL) 20 MG Tab TAKE 1/2 TABLETS BY MOUTH EVERY DAY. 90 Tab 0   • atorvastatin (LIPITOR) 40 MG Tab TAKE 1 TABLET BY MOUTH EVERY DAY. 90 Tab 0   • trazodone (DESYREL) 100 MG Tab Take 1 Tab by mouth every day. 30 Tab 3   • insulin glargine (BASAGLAR KWIKPEN) 100 UNIT/ML Solution Pen-injector injection Inject 30 Units as instructed every evening. (Patient taking differently: Inject 40 Units as instructed every evening.) 3 PEN 6   • lisinopril (PRINIVIL) 10 MG Tab Take 1 Tab by mouth every day. 30 Tab 3   • metformin (GLUCOPHAGE) 1000 MG tablet Take 1 Tab by mouth every day. (Patient taking differently: Take 1,000 mg by mouth 2 times a day.) 30 Tab 6   • metoprolol (LOPRESSOR) 25 MG Tab Take 0.5 Tabs by mouth 2 times a day. 120  "Tab 3   • aspirin 81 MG tablet Take 81 mg by mouth every morning. Starting 9/26      • Blood Glucose Monitoring Suppl (BLOOD GLUCOSE MONITOR SYSTEM) w/Device Kit 1 Device by Does not apply route 4 times a day. 1 Kit 0   • B-D ULTRAFINE III SHORT PEN 31G X 8 MM Misc USE WITH INSULIN AS INSTRUCTED ONCE A  Each 0   • Lancet Devices (TRUEDRAW LANCING DEVICE) Misc by Does not apply route.       No current facility-administered medications for this visit.        Social History   Substance Use Topics   • Smoking status: Former Smoker     Packs/day: 0.50     Years: 40.00     Types: Cigarettes     Quit date: 4/1/2017   • Smokeless tobacco: Never Used      Comment: 1/2 ppd - 1ppd    • Alcohol use No       Family History   Problem Relation Age of Onset   • Heart Disease Father    • Stroke Father        Review of Systems:     Pertinent positives as stated in HPI, all others reviewed as negative.    Physical Exam:  Blood pressure 120/70, pulse 66, temperature 36.6 °C (97.8 °F), height 1.626 m (5' 4.02\"), weight 74.4 kg (164 lb), SpO2 97 %. Body mass index is 28.13 kg/m².    General Appearance: healthy, alert, no distress, cooperative  Skin: No rashes or lesions.  Head: Normocephalic. No masses appreciated.   Oropharynx: Oropharynx moist and without lesion.  Lungs: Lungs clear to auscultation bilaterally.  Heart: RRR without murmur, gallop, or rubs.  Abdomen: Soft, non-tender. BS normal.   Musculoskeletal: Extremities: Upper and lower extremities appear normal. No deformities, edema.   Psychiatric: Mood appears normal.     Assessment/Plan:     1. Gastroesophageal reflux disease without esophagitis  - Will try Protonix. Patient has been on it in the past.  - Advised on eating small, frequent meals. Advised on what foods to avoid.   - pantoprazole (PROTONIX) 20 MG tablet; Take 1 Tab by mouth every day.  Dispense: 30 Tab; Refill: 3    2. Uncontrolled type 2 diabetes mellitus with complication, unspecified long term insulin " use status (CMS-HCC)  - Increased insulin from 35 U to 40 U. There has been improvement with her Hba1C but not within goal yet.  - She is on Metformin. Will continue that for now as well.  - Patient insisting on seeing an Endocrinologist for this. Will make referral on patient request.  - Advised on diet and exercise. Diabetes education referral not covered by insurance.   - REFERRAL TO ENDOCRINOLOGY    3. Dyslipidemia  - Continue Atorvastatin.     4. Essential hypertension, benign  - Continue Lisinopril (patient not sure of dose) and Metoprolol.    5. Breast cancer screening  - Due for mammogram. Will order.  - MA-SCREEN MAMMO W/CAD-BILAT; Future    6. Neuropathy (CMS-HCC)  - Will place on Amitriptyline. Patient unable to function because of the pain.  - She was tried on Gabapentin and experienced side effects.  - Lyrica was prescribed at previous visit, insurance didn't cover.   - amitriptyline (ELAVIL) 25 MG Tab; Take 1 Tab by mouth every day.  Dispense: 30 Tab; Refill: 3      Followup: Return in about 3 months (around 3/12/2018), or if symptoms worsen or fail to improve.

## 2017-12-18 DIAGNOSIS — I25.10 CORONARY ARTERY DISEASE, NON-OCCLUSIVE: ICD-10-CM

## 2017-12-20 ENCOUNTER — TELEPHONE (OUTPATIENT)
Dept: INTERNAL MEDICINE | Facility: MEDICAL CENTER | Age: 62
End: 2017-12-20

## 2017-12-21 NOTE — TELEPHONE ENCOUNTER
VOICEMAIL  1. Caller Name: Mary Moore   Call Back Number: [phone] (home)       2. Message: Needs for us to start a prior authorization on her Pantoprazole.  She has been vomiting and having indigestion.     3. Patient approves office to leave a detailed voicemail/MyChart message: yes

## 2017-12-21 NOTE — TELEPHONE ENCOUNTER
DOCUMENTATION OF PAR STATUS:    1. Name of Medication & Dose: Pantoprazole 20 mg      2. Name of Prescription Coverage Company & phone #: [employer] through [employer]    3. Date Prior Auth Submitted: 12/20/17    4. What information was given to obtain insurance decision? Diagnosis and notes     5. Prior Auth Letter Approved or Denied? Pending     6. Action Taken: Pharmacy/Patient Notified: Yes

## 2017-12-23 NOTE — TELEPHONE ENCOUNTER
Medication was denied.  Patient needs to try and fail 2 of the following medicine: Lansoprazole, Prevacid, Omeprazole or Nexium.

## 2017-12-23 NOTE — TELEPHONE ENCOUNTER
I called patient and left detailed message on her voicemail stating that her medication was denied and she had to try different medication and for her to give us a call with which ones she would like to try and we would gladly send it in to her pharmacy Per Dr. Newsome.

## 2017-12-27 DIAGNOSIS — K21.9 GASTROESOPHAGEAL REFLUX DISEASE WITHOUT ESOPHAGITIS: ICD-10-CM

## 2017-12-27 NOTE — TELEPHONE ENCOUNTER
Patient called back and she states she did not want any over the counter medications because they don't help her out.  She only wants to take medications from the pharmacy.  So if you can please send in any of the medications to her pharmacy.

## 2018-02-20 NOTE — TELEPHONE ENCOUNTER
Patient Seen: 12/12/17 With Dr. IVA Newsome  Next Appointment: 03/13/18 With Dr. IVA Newsome  Was the patient seen in the last year in this department? Yes     Does patient have an active prescription for medications requested? No     Received Request Via: Patient

## 2018-02-21 RX ORDER — ATORVASTATIN CALCIUM 40 MG/1
40 TABLET, FILM COATED ORAL
Qty: 90 TAB | Refills: 0 | Status: SHIPPED | OUTPATIENT
Start: 2018-02-21 | End: 2018-05-24 | Stop reason: SDUPTHER

## 2018-03-08 ENCOUNTER — HOSPITAL ENCOUNTER (OUTPATIENT)
Dept: LAB | Facility: MEDICAL CENTER | Age: 63
End: 2018-03-08
Attending: INTERNAL MEDICINE
Payer: MEDICAID

## 2018-03-08 LAB
CREAT UR-MCNC: 235.9 MG/DL
MICROALBUMIN UR-MCNC: 1.6 MG/DL
MICROALBUMIN/CREAT UR: 7 MG/G (ref 0–30)

## 2018-03-08 PROCEDURE — 82043 UR ALBUMIN QUANTITATIVE: CPT

## 2018-03-08 PROCEDURE — 82570 ASSAY OF URINE CREATININE: CPT

## 2018-03-13 ENCOUNTER — OFFICE VISIT (OUTPATIENT)
Dept: INTERNAL MEDICINE | Facility: MEDICAL CENTER | Age: 63
End: 2018-03-13
Payer: MEDICAID

## 2018-03-13 VITALS
TEMPERATURE: 97.7 F | BODY MASS INDEX: 26.8 KG/M2 | OXYGEN SATURATION: 96 % | SYSTOLIC BLOOD PRESSURE: 136 MMHG | DIASTOLIC BLOOD PRESSURE: 82 MMHG | WEIGHT: 157 LBS | HEIGHT: 64 IN | HEART RATE: 70 BPM

## 2018-03-13 DIAGNOSIS — I10 ESSENTIAL HYPERTENSION, BENIGN: ICD-10-CM

## 2018-03-13 DIAGNOSIS — M79.7 FIBROMYALGIA: ICD-10-CM

## 2018-03-13 DIAGNOSIS — E78.5 DYSLIPIDEMIA: ICD-10-CM

## 2018-03-13 DIAGNOSIS — K21.9 GASTROESOPHAGEAL REFLUX DISEASE WITHOUT ESOPHAGITIS: ICD-10-CM

## 2018-03-13 DIAGNOSIS — E11.65 UNCONTROLLED TYPE 2 DIABETES MELLITUS WITH COMPLICATION, UNSPECIFIED LONG TERM INSULIN USE STATUS: ICD-10-CM

## 2018-03-13 DIAGNOSIS — E11.8 UNCONTROLLED TYPE 2 DIABETES MELLITUS WITH COMPLICATION, UNSPECIFIED LONG TERM INSULIN USE STATUS: ICD-10-CM

## 2018-03-13 DIAGNOSIS — F41.9 ANXIETY: ICD-10-CM

## 2018-03-13 DIAGNOSIS — G47.00 INSOMNIA, UNSPECIFIED TYPE: ICD-10-CM

## 2018-03-13 PROCEDURE — 99214 OFFICE O/P EST MOD 30 MIN: CPT | Performed by: INTERNAL MEDICINE

## 2018-03-13 NOTE — LETTER
March 13, 2018        Mary Moore  [address] [place] [address]      To whom it may concern,    Patient was seen in the Internal Medicine office today by me.     If you have any questions or concerns, please don't hesitate to call.        Sincerely,        Miki Newsome M.D.    Electronically Signed

## 2018-03-13 NOTE — PROGRESS NOTES
Mary Moore is a 62 y.o. female who is here for routine follow up.    CC: Diabetes, GERD    HPI:    Uncontrolled type 2 diabetes mellitus with complication, unspecified long term insulin use status:  When the patient was previously seen by me, her Insulin dosage was increased from 35 U to 40 U.  Her last Hba1c was 9.8 from December.  At the patient's request, a referral was made for her to see an Endocrinologist.  Endo increased her Insulin from 40 U to 41 U.  Her latest Hba1c is 6.9.  She is also on Metformin.  Patient says she feels better and her numbers are also looking better.   She has also seen a Dietician.   Endocrinologist ordered lab work for her which includes CMP, Vitamin D, Lipid panel, TSH.  Her latest micro albumin creatinine is within normal limits.   She is still feeling cold all the time hence thyroid studies ordered by Endo.     Gastroesophageal reflux disease without esophagitis:  This has essentially resolved.  She has followed up with a Dietician.  Patient says she no longer drinks soda that was causing her a lot of acid reflux.   She is now drinking Ice which is flavored but with no calories.  Patient says she no longer has to take her medication.    Dyslipidemia:  Patient is on Atorvastatin. Doing well on this medication.  She also does see a Cardiologist as well and needs to make an appointment with them.    Essential hypertension, benign:  Bp within range.  On medications.  No chest pain, chest pressure, vision changes, or headaches noted.     Insomnia/Anxiety/Depression:  Patient is on Abilify, Fluoxetine, and Trazodone.  She is following up with a therapist.  Is doing well on these medications.     No problem-specific Assessment & Plan notes found for this encounter.      She  has a past medical history of Anxiety (6/23/2016); Chest pain (6/23/2016); Constipation (6/23/2016); Dyslipidemia (6/23/2016); Epigastric pain (6/23/2016); Facial rash (6/23/2016); Fibromyalgia  (6/23/2016); GERD (gastroesophageal reflux disease) (6/23/2016); HTN (hypertension) (6/23/2016); Hypercalcemia (6/23/2016); Hyperlipidemia; Hypertension; Insomnia; Lentigo (6/23/2016); Lupus erythematosus (6/23/2016); Multiple pulmonary nodules (6/23/2016); Urinary incontinence (6/23/2016); and Vitamin D deficiency (6/23/2016). She also has no past medical history of Breast cancer (CMS-HCC).    Patient Active Problem List    Diagnosis Date Noted   • Adult respiratory stress syndrome (CMS-HCC) 06/02/2017   • Precordial pain 06/02/2017   • Screening mammogram, encounter for 11/29/2016   • Essential hypertension, benign 11/14/2016   • Coronary artery disease, non-occlusive 11/14/2016   • Tobacco use 08/18/2016   • History of lupus 08/18/2016   • Vitamin B12 deficiency 08/15/2016   • Multiple pulmonary nodules 06/23/2016   • Fibromyalgia 06/23/2016   • GERD (gastroesophageal reflux disease) 06/23/2016   • Dyslipidemia 06/23/2016   • Insomnia 06/23/2016   • Anxiety 06/23/2016   • Vitamin D deficiency 06/23/2016   • Diabetes type 2, uncontrolled (CMS-HCC) 09/21/2010       Allergies:Patient has no known allergies.    Current Outpatient Prescriptions   Medication Sig Dispense Refill   • FLUoxetine (PROZAC) 10 MG Cap Take 10 mg by mouth every day.     • atorvastatin (LIPITOR) 40 MG Tab Take 1 Tab by mouth every day. 90 Tab 0   • metoprolol (LOPRESSOR) 25 MG Tab TAKE 1/2 TABLET BY MOUTH 2 TIMES A DAY. 90 Tab 3   • trazodone (DESYREL) 100 MG Tab Take 1 Tab by mouth every day. 30 Tab 3   • Blood Glucose Monitoring Suppl (BLOOD GLUCOSE MONITOR SYSTEM) w/Device Kit 1 Device by Does not apply route 4 times a day. 1 Kit 0   • insulin glargine (BASAGLAR KWIKPEN) 100 UNIT/ML Solution Pen-injector injection Inject 30 Units as instructed every evening. (Patient taking differently: Inject 41 Units as instructed every evening.) 3 PEN 6   • B-D ULTRAFINE III SHORT PEN 31G X 8 MM Misc USE WITH INSULIN AS INSTRUCTED ONCE A  Each 0  "  • metformin (GLUCOPHAGE) 1000 MG tablet Take 1 Tab by mouth every day. (Patient taking differently: Take 1,000 mg by mouth 2 times a day.) 30 Tab 6   • Lancet Devices (TRUEDRAW LANCING DEVICE) Misc by Does not apply route.     • aspirin 81 MG tablet Take 81 mg by mouth every morning. Starting 9/26      • aripiprazole (ABILIFY) 2 MG tablet Take 2 mg by mouth.  1   • amitriptyline (ELAVIL) 25 MG Tab Take 1 Tab by mouth every day. 30 Tab 3   • lisinopril (PRINIVIL) 10 MG Tab Take 1 Tab by mouth every day. 30 Tab 3     No current facility-administered medications for this visit.        Social History   Substance Use Topics   • Smoking status: Former Smoker     Packs/day: 0.50     Years: 40.00     Types: Cigarettes     Quit date: 4/1/2017   • Smokeless tobacco: Never Used      Comment: 1/2 ppd - 1ppd    • Alcohol use No       Family History   Problem Relation Age of Onset   • Heart Disease Father    • Stroke Father        Review of Systems:     Pertinent positives as stated in HPI, all others reviewed as negative.    Physical Exam:  Blood pressure 136/82, pulse 70, temperature 36.5 °C (97.7 °F), height 1.626 m (5' 4.02\"), weight 71.2 kg (157 lb), SpO2 96 %. Body mass index is 26.93 kg/m².    General Appearance: healthy, alert, no distress, cooperative  Skin: Skin color, texture, turgor normal. No rashes or lesions.  Head: Normocephalic. No masses appreciated.   Eyes: PERRLA.  Oropharynx: Oropharynx moist and without lesion.  Neck: Neck supple. No adenopathy.   Lungs: Lungs clear to auscultation bilaterally.  Heart: RRR without murmur, gallop, or rubs.  Abdomen: Soft, non-tender. BS normal.   Musculoskeletal: Extremities: Upper and lower extremities appear normal. No deformities, edema.   Peripheral Pulses: Pulses: radial=4/4, dorsalis pedis=4/4  Psychiatric: Mood appears normal.     Assessment/Plan:     1. Uncontrolled type 2 diabetes mellitus with complication, unspecified long term insulin use status (CMS-[facility])  - " Doing well.  - On Insulin Glargine 41 U daily.  - Also on Metformin.  - Latest Hba1c is 6.9.  - Following with Endo and Dietician.  - Micro albumin-creatinine ratio within range.   - Continue current management. Will get labs in 3 months ordered by Endo.     2. Fibromyalgia  - Doing well. She is on Elavil. No complaints.     3. Gastroesophageal reflux disease without esophagitis  - No longer on medication.  - Controlled with diet.     4. Dyslipidemia  - On Statin. Doing well.     5. Essential hypertension, benign  - BP within range. No issues.     6. Insomnia, unspecified type  - Takes Trazodone.   - Working well for her.     7. Anxiety/Depression  - Following with up therapist.  - She is on Abilify and Prozac. Stable on these medications.       Followup: Return in about 6 months (around 9/13/2018), or if symptoms worsen or fail to improve.

## 2018-03-14 RX ORDER — FLUOXETINE 10 MG/1
10 CAPSULE ORAL DAILY
COMMUNITY
End: 2018-11-12

## 2018-04-04 ENCOUNTER — HOSPITAL ENCOUNTER (OUTPATIENT)
Dept: LAB | Facility: MEDICAL CENTER | Age: 63
End: 2018-04-04
Attending: INTERNAL MEDICINE
Payer: MEDICAID

## 2018-04-04 LAB
25(OH)D3 SERPL-MCNC: 24 NG/ML (ref 30–100)
ALBUMIN SERPL BCP-MCNC: 4 G/DL (ref 3.2–4.9)
ALBUMIN/GLOB SERPL: 1.3 G/DL
ALP SERPL-CCNC: 57 U/L (ref 30–99)
ALT SERPL-CCNC: 15 U/L (ref 2–50)
ANION GAP SERPL CALC-SCNC: 6 MMOL/L (ref 0–11.9)
AST SERPL-CCNC: 17 U/L (ref 12–45)
BASOPHILS # BLD AUTO: 0.4 % (ref 0–1.8)
BASOPHILS # BLD: 0.03 K/UL (ref 0–0.12)
BILIRUB SERPL-MCNC: 0.2 MG/DL (ref 0.1–1.5)
BUN SERPL-MCNC: 18 MG/DL (ref 8–22)
CALCIUM SERPL-MCNC: 9.2 MG/DL (ref 8.5–10.5)
CHLORIDE SERPL-SCNC: 111 MMOL/L (ref 96–112)
CHOLEST SERPL-MCNC: 90 MG/DL (ref 100–199)
CO2 SERPL-SCNC: 22 MMOL/L (ref 20–33)
CREAT SERPL-MCNC: 0.65 MG/DL (ref 0.5–1.4)
EOSINOPHIL # BLD AUTO: 0.51 K/UL (ref 0–0.51)
EOSINOPHIL NFR BLD: 6.8 % (ref 0–6.9)
ERYTHROCYTE [DISTWIDTH] IN BLOOD BY AUTOMATED COUNT: 54.5 FL (ref 35.9–50)
EST. AVERAGE GLUCOSE BLD GHB EST-MCNC: 140 MG/DL
GLOBULIN SER CALC-MCNC: 3.2 G/DL (ref 1.9–3.5)
GLUCOSE SERPL-MCNC: 101 MG/DL (ref 65–99)
HBA1C MFR BLD: 6.5 % (ref 0–5.6)
HCT VFR BLD AUTO: 45.1 % (ref 37–47)
HDLC SERPL-MCNC: 38 MG/DL
HGB BLD-MCNC: 14.1 G/DL (ref 12–16)
IMM GRANULOCYTES # BLD AUTO: 0.01 K/UL (ref 0–0.11)
IMM GRANULOCYTES NFR BLD AUTO: 0.1 % (ref 0–0.9)
LDLC SERPL CALC-MCNC: 32 MG/DL
LYMPHOCYTES # BLD AUTO: 2.69 K/UL (ref 1–4.8)
LYMPHOCYTES NFR BLD: 36 % (ref 22–41)
MCH RBC QN AUTO: 30.1 PG (ref 27–33)
MCHC RBC AUTO-ENTMCNC: 31.3 G/DL (ref 33.6–35)
MCV RBC AUTO: 96.2 FL (ref 81.4–97.8)
MONOCYTES # BLD AUTO: 0.55 K/UL (ref 0–0.85)
MONOCYTES NFR BLD AUTO: 7.4 % (ref 0–13.4)
NEUTROPHILS # BLD AUTO: 3.69 K/UL (ref 2–7.15)
NEUTROPHILS NFR BLD: 49.3 % (ref 44–72)
NRBC # BLD AUTO: 0 K/UL
NRBC BLD-RTO: 0 /100 WBC
PLATELET # BLD AUTO: 236 K/UL (ref 164–446)
PMV BLD AUTO: 10.5 FL (ref 9–12.9)
POTASSIUM SERPL-SCNC: 3.5 MMOL/L (ref 3.6–5.5)
PROT SERPL-MCNC: 7.2 G/DL (ref 6–8.2)
RBC # BLD AUTO: 4.69 M/UL (ref 4.2–5.4)
SODIUM SERPL-SCNC: 139 MMOL/L (ref 135–145)
TRIGL SERPL-MCNC: 98 MG/DL (ref 0–149)
TSH SERPL DL<=0.005 MIU/L-ACNC: 1.48 UIU/ML (ref 0.38–5.33)
WBC # BLD AUTO: 7.5 K/UL (ref 4.8–10.8)

## 2018-04-04 PROCEDURE — 85025 COMPLETE CBC W/AUTO DIFF WBC: CPT

## 2018-04-04 PROCEDURE — 36415 COLL VENOUS BLD VENIPUNCTURE: CPT

## 2018-04-04 PROCEDURE — 82306 VITAMIN D 25 HYDROXY: CPT

## 2018-04-04 PROCEDURE — 84443 ASSAY THYROID STIM HORMONE: CPT

## 2018-04-04 PROCEDURE — 80061 LIPID PANEL: CPT

## 2018-04-04 PROCEDURE — 80053 COMPREHEN METABOLIC PANEL: CPT

## 2018-04-04 PROCEDURE — 83036 HEMOGLOBIN GLYCOSYLATED A1C: CPT

## 2018-04-04 PROCEDURE — 84681 ASSAY OF C-PEPTIDE: CPT

## 2018-04-05 LAB — C PEPTIDE SERPL-MCNC: 1.2 NG/ML (ref 0.8–3.5)

## 2018-04-14 DIAGNOSIS — E11.65 UNCONTROLLED TYPE 2 DIABETES MELLITUS WITH COMPLICATION, UNSPECIFIED LONG TERM INSULIN USE STATUS: ICD-10-CM

## 2018-04-14 DIAGNOSIS — E11.8 UNCONTROLLED TYPE 2 DIABETES MELLITUS WITH COMPLICATION, UNSPECIFIED LONG TERM INSULIN USE STATUS: ICD-10-CM

## 2018-04-16 NOTE — TELEPHONE ENCOUNTER
Last seen: 03/13/18 by Dr. Newsome  Next appt: 06/26/18 with Dr. Newsome    Was the patient seen in the last year in this department? Yes   Does patient have an active prescription for medications requested? No   Received Request Via: Pharmacy

## 2018-04-18 RX ORDER — INSULIN GLARGINE 100 [IU]/ML
30 INJECTION, SOLUTION SUBCUTANEOUS EVERY EVENING
Qty: 15 PEN | Refills: 0 | Status: SHIPPED | OUTPATIENT
Start: 2018-04-18 | End: 2018-05-17 | Stop reason: SDUPTHER

## 2018-05-05 ENCOUNTER — TELEPHONE (OUTPATIENT)
Dept: INTERNAL MEDICINE | Facility: MEDICAL CENTER | Age: 63
End: 2018-05-05

## 2018-05-05 DIAGNOSIS — E11.8 UNCONTROLLED TYPE 2 DIABETES MELLITUS WITH COMPLICATION, UNSPECIFIED WHETHER LONG TERM INSULIN USE: ICD-10-CM

## 2018-05-05 DIAGNOSIS — Z79.899 MEDICATION MANAGEMENT: ICD-10-CM

## 2018-05-05 DIAGNOSIS — E11.65 UNCONTROLLED TYPE 2 DIABETES MELLITUS WITH COMPLICATION, UNSPECIFIED WHETHER LONG TERM INSULIN USE: ICD-10-CM

## 2018-05-05 DIAGNOSIS — R91.8 MULTIPLE PULMONARY NODULES: ICD-10-CM

## 2018-05-05 NOTE — TELEPHONE ENCOUNTER
Returned after hours page. Patient worried about her sugar levels being elevated if she does not take her metformin over the weekend. Per, patient she does take insulin glargine 38-41 units every night depending on her fasting blood glucose. Reassured patient that missing a few does over the weekend should not alter her blood sugars drastically since she is taking a basal insulin.   Refilled patients metformin rx 1000mg BID.   Patient went on to request a referral to pulmonology since she has not been able to see her pulmonologist that was with [facility] due to insurance changes about a year ago. She has history of pulmonary nodules and is concerned that her breathing feels different, and she feels different. Non emergent concern.    Referral to pulmonology provided. Patient will be following up with her PCP.

## 2018-05-17 RX ORDER — INSULIN GLARGINE 100 [IU]/ML
30 INJECTION, SOLUTION SUBCUTANEOUS EVERY EVENING
Qty: 15 PEN | Refills: 0 | Status: SHIPPED | OUTPATIENT
Start: 2018-05-17 | End: 2018-10-09 | Stop reason: SDUPTHER

## 2018-05-17 NOTE — TELEPHONE ENCOUNTER
Last seen: 03/13/18 by Dr. Castillo  Next appt: 06/26/18 with Dr. castillo    Was the patient seen in the last year in this department? Yes   Does patient have an active prescription for medications requested? No   Received Request Via: Pharmacy

## 2018-05-24 NOTE — TELEPHONE ENCOUNTER
Last seen: 3/13/18 by Dr. Newsome  Next appt: 6/26/18 with Dr. Newsome     Was the patient seen in the last year in this department? Yes   Does patient have an active prescription for medications requested? No   Received Request Via: Patient/ Pharmacy

## 2018-05-25 RX ORDER — ATORVASTATIN CALCIUM 40 MG/1
40 TABLET, FILM COATED ORAL
Qty: 90 TAB | Refills: 0 | Status: SHIPPED | OUTPATIENT
Start: 2018-05-25 | End: 2018-08-17 | Stop reason: SDUPTHER

## 2018-06-19 ENCOUNTER — HOSPITAL ENCOUNTER (OUTPATIENT)
Dept: RADIOLOGY | Facility: MEDICAL CENTER | Age: 63
End: 2018-06-19
Attending: INTERNAL MEDICINE
Payer: MEDICAID

## 2018-06-19 DIAGNOSIS — Z12.39 BREAST CANCER SCREENING: ICD-10-CM

## 2018-06-19 PROCEDURE — 77067 SCR MAMMO BI INCL CAD: CPT

## 2018-06-26 ENCOUNTER — OFFICE VISIT (OUTPATIENT)
Dept: INTERNAL MEDICINE | Facility: MEDICAL CENTER | Age: 63
End: 2018-06-26
Payer: MEDICAID

## 2018-06-26 VITALS
OXYGEN SATURATION: 93 % | HEART RATE: 84 BPM | TEMPERATURE: 96.8 F | HEIGHT: 64 IN | SYSTOLIC BLOOD PRESSURE: 130 MMHG | DIASTOLIC BLOOD PRESSURE: 85 MMHG | BODY MASS INDEX: 25.61 KG/M2 | WEIGHT: 150 LBS

## 2018-06-26 DIAGNOSIS — G47.00 INSOMNIA, UNSPECIFIED TYPE: ICD-10-CM

## 2018-06-26 DIAGNOSIS — E55.9 VITAMIN D DEFICIENCY: ICD-10-CM

## 2018-06-26 DIAGNOSIS — Z72.0 TOBACCO USE: ICD-10-CM

## 2018-06-26 DIAGNOSIS — E11.65 UNCONTROLLED TYPE 2 DIABETES MELLITUS WITH COMPLICATION, UNSPECIFIED WHETHER LONG TERM INSULIN USE: ICD-10-CM

## 2018-06-26 DIAGNOSIS — R91.8 LUNG NODULES: ICD-10-CM

## 2018-06-26 DIAGNOSIS — E78.5 DYSLIPIDEMIA: ICD-10-CM

## 2018-06-26 DIAGNOSIS — M79.7 FIBROMYALGIA: ICD-10-CM

## 2018-06-26 DIAGNOSIS — K21.9 GASTROESOPHAGEAL REFLUX DISEASE WITHOUT ESOPHAGITIS: ICD-10-CM

## 2018-06-26 DIAGNOSIS — E11.8 UNCONTROLLED TYPE 2 DIABETES MELLITUS WITH COMPLICATION, UNSPECIFIED WHETHER LONG TERM INSULIN USE: ICD-10-CM

## 2018-06-26 DIAGNOSIS — I10 ESSENTIAL HYPERTENSION, BENIGN: ICD-10-CM

## 2018-06-26 PROCEDURE — 99214 OFFICE O/P EST MOD 30 MIN: CPT | Performed by: INTERNAL MEDICINE

## 2018-06-26 RX ORDER — CHOLECALCIFEROL (VITAMIN D3) 125 MCG
1 CAPSULE ORAL DAILY
Qty: 30 TAB | Refills: 3 | Status: SHIPPED | OUTPATIENT
Start: 2018-06-26 | End: 2018-11-12

## 2018-06-26 ASSESSMENT — PATIENT HEALTH QUESTIONNAIRE - PHQ9: CLINICAL INTERPRETATION OF PHQ2 SCORE: 0

## 2018-06-26 NOTE — PROGRESS NOTES
Mary Moore is a 62 y.o. female who is here for routine follow up.    CC: Diabetes, Hypertension, Vitamin D def    HPI:    Patient is a 62-year-old female with an extensive past medical history who presents to clinic today for routine follow-up. She has a past medical history of diabetes mellitus type 2 for which she is on insulin as well as metformin 500 mg twice a day. She follows up with endocrinologist on a regular basis and her last hemoglobin A1c was noted to be 6.5. Patient says her insulin fluctuates based on what her glucose levels are but she normally takes 40-41 units daily. She follows up with the ophthalmologist on a regular basis and does have peripheral neuropathy from the diabetes. Patient is on Elavil. She says her neuropathy is stable and she also does have a history of fibromyalgia. She denied any nausea, vomiting, abdominal pain, changes in her bowel movements, fever, chills at this point.    She does have a history of multiple lung nodules for which she requested a pulmonology consult. A pulmonology consult was placed but she has not followed up with them as they are located in [place]. She is requesting if a CT scan can be done to follow-up on those pulmonary nodules. I did explain to her that they were stable on the recent examination and there was no follow-up recommended but patient does want to get a follow-up. Any complaints of shortness of breath, difficulty in breathing, night sweats, weight loss, or loss of appetite.    We went over other lab work that was done recently by her endocrinologist in April and it did show a low vitamin D levels. I instructed patient to take vitamin D and calcium and patient is requesting a prescription for that. In regards to her insomnia, patient is on trazodone and doing well. She is also on metoprolol, lisinopril for her blood pressure. She is also on atorvastatin for hyperlipidemia and does not have any complaints in relation to that. Does  not have any complaints of chest pain, chest pressure, vision changes. She also does have a history of anxiety for which she is on fluoxetine and has been stable on 10 mg daily. Does not report any suicidal ideation or homicidal ideation. She doesn't have any other complaints at this time.      No problem-specific Assessment & Plan notes found for this encounter.      She  has a past medical history of Anxiety (6/23/2016); Chest pain (6/23/2016); Constipation (6/23/2016); Dyslipidemia (6/23/2016); Epigastric pain (6/23/2016); Facial rash (6/23/2016); Fibromyalgia (6/23/2016); GERD (gastroesophageal reflux disease) (6/23/2016); HTN (hypertension) (6/23/2016); Hypercalcemia (6/23/2016); Hyperlipidemia; Hypertension; Insomnia; Lentigo (6/23/2016); Lupus erythematosus (6/23/2016); Multiple pulmonary nodules (6/23/2016); Urinary incontinence (6/23/2016); and Vitamin D deficiency (6/23/2016). She also has no past medical history of Breast cancer ([facility]).    Patient Active Problem List    Diagnosis Date Noted   • Adult respiratory stress syndrome (HCC) 06/02/2017   • Precordial pain 06/02/2017   • Screening mammogram, encounter for 11/29/2016   • Essential hypertension, benign 11/14/2016   • Coronary artery disease, non-occlusive 11/14/2016   • Tobacco use 08/18/2016   • History of lupus 08/18/2016   • Vitamin B12 deficiency 08/15/2016   • Multiple pulmonary nodules 06/23/2016   • Fibromyalgia 06/23/2016   • GERD (gastroesophageal reflux disease) 06/23/2016   • Dyslipidemia 06/23/2016   • Insomnia 06/23/2016   • Anxiety 06/23/2016   • Vitamin D deficiency 06/23/2016   • Diabetes type 2, uncontrolled ([facility]) 09/21/2010       Allergies:Patient has no known allergies.    Current Outpatient Prescriptions   Medication Sig Dispense Refill   • Cholecalciferol (VITAMIN D3) 2000 units Tab Take 1 Tab by mouth every day. 30 Tab 3   • atorvastatin (LIPITOR) 40 MG Tab Take 1 Tab by mouth every day. 90 Tab 0   • Insulin Glargine (BASAGLAR  "KWIKPEN) 100 UNIT/ML Solution Pen-injector INJECT 30 UNITS AS INSTRUCTED EVERY EVENING. 15 PEN 0   • metformin (GLUCOPHAGE) 1000 MG tablet Take 1 Tab by mouth 2 times a day. (Patient taking differently: Take 500 mg by mouth 2 times a day.) 60 Tab 6   • FLUoxetine (PROZAC) 10 MG Cap Take 10 mg by mouth every day.     • metoprolol (LOPRESSOR) 25 MG Tab TAKE 1/2 TABLET BY MOUTH 2 TIMES A DAY. 90 Tab 3   • aripiprazole (ABILIFY) 2 MG tablet Take 2 mg by mouth.  1   • amitriptyline (ELAVIL) 25 MG Tab Take 1 Tab by mouth every day. 30 Tab 3   • trazodone (DESYREL) 100 MG Tab Take 1 Tab by mouth every day. 30 Tab 3   • Blood Glucose Monitoring Suppl (BLOOD GLUCOSE MONITOR SYSTEM) w/Device Kit 1 Device by Does not apply route 4 times a day. 1 Kit 0   • B-D ULTRAFINE III SHORT PEN 31G X 8 MM Misc USE WITH INSULIN AS INSTRUCTED ONCE A  Each 0   • lisinopril (PRINIVIL) 10 MG Tab Take 1 Tab by mouth every day. 30 Tab 3   • Lancet Devices (TRUEDRAW LANCING DEVICE) Misc by Does not apply route.     • aspirin 81 MG tablet Take 81 mg by mouth every morning. Starting 9/26        No current facility-administered medications for this visit.        Social History   Substance Use Topics   • Smoking status: Former Smoker     Packs/day: 0.50     Years: 40.00     Types: Cigarettes     Quit date: 4/1/2017   • Smokeless tobacco: Never Used      Comment: 1/2 ppd - 1ppd    • Alcohol use No       Family History   Problem Relation Age of Onset   • Heart Disease Father    • Stroke Father      Review of Systems:   Pertinent positives as stated in HPI, all others reviewed as negative.    Physical Exam:  Blood pressure 130/85, pulse 84, temperature 36 °C (96.8 °F), height 1.626 m (5' 4.02\"), weight 68 kg (150 lb), SpO2 93 %. Body mass index is 25.73 kg/m².    General Appearance: healthy, alert, no distress, cooperative  Skin: No rashes or lesions.  Head: Normocephalic. No masses appreciated.   Eyes: PERRLA.  Oropharynx: Oropharynx moist and " without lesion.  Neck: Neck supple. No adenopathy.   Lungs: Lungs clear to auscultation bilaterally.  Heart: RRR without murmur, gallop, or rubs.  Abdomen: Soft, non-tender. BS normal.   Musculoskeletal: Extremities: Upper and lower extremities appear normal. No deformities, edema.   Peripheral Pulses: Pulses: radial=4/4, dorsalis pedis=4/4  Psychiatric: Mood appears normal.     Assessment/Plan:     1. Uncontrolled type 2 diabetes mellitus with complication, unspecified whether long term insulin use (HCC)  Diabetes is not well controlled.  Patient takes glargine 40-41 units daily. She also checks her blood glucose levels frequently and this morning fasting it was 114.  Patient is also on metformin 500 mg twice a day.  She is following up with endocrinologist who also does frequent lab work. The lab work from April showed a hemoglobin A1c of 6.4.  Patient sees ophthalmologist on a regular basis as well.  No other needs identified at this point.    2. Dyslipidemia  Continue on atorvastatin 40 mg. Patient is doing well on this medication does not endorse any complaints.    3. Essential hypertension, benign  Blood pressure is within normal limits. Continue on metoprolol and lisinopril.    4. Gastroesophageal reflux disease without esophagitis  Patient does not endorse any complaints today. We'll continue to monitor.    5. Vitamin D deficiency  Vitamin D level was noted to be 24. Patient recommended to take vitamin D3 2000 international units. Also advised to take calcium. We'll repeat levels at the next visit.  - Cholecalciferol (VITAMIN D3) 2000 units Tab; Take 1 Tab by mouth every day.  Dispense: 30 Tab; Refill: 3    6. Lung nodules  Patient has a history of multiple lung nodules. Her last CT scan of the thorax was in 2016. There is a referral for her to see a pulmonologist but the nearest when she can get in to see his in [place] and she cannot try that far.  I did talk to her about the referral just contacting  her in regards to transportation and she says she will do that at some point.  At the patient's request we'll get a CT of the chest to follow-up on the lung nodules.  - CT-CHEST (THORAX) WITH; Future    7. Fibromyalgia  Patient is on amitriptyline. Stable on this medication. No concerns.    8. Insomnia, unspecified type  Patient takes trazodone 100 mg daily. Stable on this medication. No complaints.    9. Tobacco use  Patient says there are days where she does not use any tobacco. Her last cigarette was about a day ago. She says her tobacco use is infrequent.  Encouraged patient to continue working on this.    10. Anxiety  Patient is on Fluoxetine. Continue.  She is also on Abilify. Exact reason not known for being on this medication and patient doesn't know but thinks for possible depression. No side effects. Will chart review and determine reason.   Tolerating medications well.      Followup: Return in about 3 months (around 9/26/2018), or if symptoms worsen or fail to improve.    This note was created using voice recognition software. There may be unintended errors spelling, and grammar or content.

## 2018-06-27 ENCOUNTER — TELEPHONE (OUTPATIENT)
Dept: INTERNAL MEDICINE | Facility: MEDICAL CENTER | Age: 63
End: 2018-06-27

## 2018-06-27 DIAGNOSIS — R79.9 ABNORMAL BLOOD CHEMISTRY: ICD-10-CM

## 2018-06-27 NOTE — TELEPHONE ENCOUNTER
Radha with [facility] called and stated because patient is over 60, patients needs to get her creatine checked first before we can do the CT of the chest with contrast.     Please place the orders for patient.

## 2018-06-28 NOTE — TELEPHONE ENCOUNTER
Called patient to both numbers left a message on her cell phone to give us a call back in regards to labs we ordered

## 2018-06-29 NOTE — TELEPHONE ENCOUNTER
Patient called and left voicemail to give her a call back and okay to leave detailed message.     Called patient and left detailed voicemail.

## 2018-07-03 ENCOUNTER — HOSPITAL ENCOUNTER (OUTPATIENT)
Dept: LAB | Facility: MEDICAL CENTER | Age: 63
End: 2018-07-03
Attending: INTERNAL MEDICINE
Payer: MEDICAID

## 2018-07-03 ENCOUNTER — OFFICE VISIT (OUTPATIENT)
Dept: INTERNAL MEDICINE | Facility: MEDICAL CENTER | Age: 63
End: 2018-07-03
Payer: MEDICAID

## 2018-07-03 VITALS
OXYGEN SATURATION: 96 % | TEMPERATURE: 97.7 F | WEIGHT: 152 LBS | BODY MASS INDEX: 25.95 KG/M2 | DIASTOLIC BLOOD PRESSURE: 80 MMHG | HEIGHT: 64 IN | HEART RATE: 64 BPM | SYSTOLIC BLOOD PRESSURE: 132 MMHG

## 2018-07-03 DIAGNOSIS — Z23 NEED FOR TDAP VACCINATION: ICD-10-CM

## 2018-07-03 DIAGNOSIS — I10 ESSENTIAL HYPERTENSION, BENIGN: ICD-10-CM

## 2018-07-03 DIAGNOSIS — Z00.00 ANNUAL PHYSICAL EXAM: ICD-10-CM

## 2018-07-03 DIAGNOSIS — E78.5 DYSLIPIDEMIA: ICD-10-CM

## 2018-07-03 DIAGNOSIS — E11.8 TYPE 2 DIABETES MELLITUS WITH COMPLICATION, WITH LONG-TERM CURRENT USE OF INSULIN (HCC): ICD-10-CM

## 2018-07-03 DIAGNOSIS — R79.9 ABNORMAL BLOOD CHEMISTRY: ICD-10-CM

## 2018-07-03 DIAGNOSIS — Z72.0 TOBACCO USE: ICD-10-CM

## 2018-07-03 DIAGNOSIS — Z79.4 TYPE 2 DIABETES MELLITUS WITH COMPLICATION, WITH LONG-TERM CURRENT USE OF INSULIN (HCC): ICD-10-CM

## 2018-07-03 LAB
ANION GAP SERPL CALC-SCNC: 7 MMOL/L (ref 0–11.9)
BUN SERPL-MCNC: 16 MG/DL (ref 8–22)
CALCIUM SERPL-MCNC: 9.4 MG/DL (ref 8.5–10.5)
CHLORIDE SERPL-SCNC: 106 MMOL/L (ref 96–112)
CO2 SERPL-SCNC: 27 MMOL/L (ref 20–33)
CREAT SERPL-MCNC: 0.79 MG/DL (ref 0.5–1.4)
GLUCOSE SERPL-MCNC: 102 MG/DL (ref 65–99)
POTASSIUM SERPL-SCNC: 3.8 MMOL/L (ref 3.6–5.5)
SODIUM SERPL-SCNC: 140 MMOL/L (ref 135–145)

## 2018-07-03 PROCEDURE — 80048 BASIC METABOLIC PNL TOTAL CA: CPT

## 2018-07-03 PROCEDURE — 90471 IMMUNIZATION ADMIN: CPT | Performed by: INTERNAL MEDICINE

## 2018-07-03 PROCEDURE — 90715 TDAP VACCINE 7 YRS/> IM: CPT | Performed by: INTERNAL MEDICINE

## 2018-07-03 PROCEDURE — 99396 PREV VISIT EST AGE 40-64: CPT | Mod: 25 | Performed by: INTERNAL MEDICINE

## 2018-07-03 PROCEDURE — 36415 COLL VENOUS BLD VENIPUNCTURE: CPT

## 2018-07-03 NOTE — NON-PROVIDER
Mary Moore is a 62 y.o. female here for a non-provider visit for:   TDAP    Reason for immunization: Overdue/Provider Recommended  Immunization records indicate need for vaccine: Yes, confirmed with Epic  Minimum interval has been met for this vaccine: Yes  ABN completed: Not Indicated    Order and dose verified by: YUE  VIS Dated  2/24/15 was given to patient: Yes  IAC Questionnaire abnormal. Questionnaire reviewed and administration of injection approved by provider: DR WETZEL     Patient tolerated injection and no adverse effects were observed or reported: Yes    Pt scheduled for next dose in series: Not Indicated

## 2018-07-03 NOTE — PROGRESS NOTES
Mary Moore is a 62 y.o. female who is here for an annual visit.    CC: Annual, Diabetes, HTN    HPI:    Patient is a 62-year-old female with a past medical history of diabetes mellitus type 2 which is well-controlled, hypertension, dyslipidemia, recent vitamin D deficiency, fibromyalgia, insomnia, and anxiety was presenting to the clinic today for an annual visit. She does not endorse any complaints today. We talked about her diabetes for which she is seeing an endocrinologist now. Her last hemoglobin A1c was noted to be 6.5 who this past April. Patient says her fasting glucose levels in the past couple of days have been in the 150s but usually there are a lot lower. She doesn't endorse any changes in her symptoms. Enterovirus or hypertension and dyslipidemia, patient is stable on medications and those conditions are well controlled. She doesn't have any complaints of headaches, vision changes, numbness or tingling anywhere, chest pain, or any chest pressure. She doesn't complain of any shortness of breath either.    Her mood is stable and patient is on Abilify and fluoxetine. She also has insomnia for which she is on trazodone. No concerns at this time.    Healthcare maintenance:  Patient just had a mammogram done June 2018 which was normal.  She is not due for colonoscopy until 2027.  Patient had a Pap smear with HPV DNA testing in 2017. She would be due in 2022.  Patient just had lab work done through her endocrinologist office in April 2018. This was reviewed with the patient.  Intercostal vaccinations patient says she's had it Is sharp but is not sure exactly when. Immunizations were reviewed and patient did not have a tetanus vaccination. She is due for that today.  Patient saw the eye doctor last month and was told she has some signs of cataracts.  Patient also saw the dentist last month and now has dentures in place.      No problem-specific Assessment & Plan notes found for this  encounter.      She  has a past medical history of Anxiety (6/23/2016); Chest pain (6/23/2016); Constipation (6/23/2016); Dyslipidemia (6/23/2016); Epigastric pain (6/23/2016); Facial rash (6/23/2016); Fibromyalgia (6/23/2016); GERD (gastroesophageal reflux disease) (6/23/2016); HTN (hypertension) (6/23/2016); Hypercalcemia (6/23/2016); Hyperlipidemia; Hypertension; Insomnia; Lentigo (6/23/2016); Lupus erythematosus (6/23/2016); Multiple pulmonary nodules (6/23/2016); Urinary incontinence (6/23/2016); and Vitamin D deficiency (6/23/2016). She also has no past medical history of Breast cancer ([facility]).    Patient Active Problem List    Diagnosis Date Noted   • Adult respiratory stress syndrome (HCC) 06/02/2017   • Precordial pain 06/02/2017   • Screening mammogram, encounter for 11/29/2016   • Essential hypertension, benign 11/14/2016   • Coronary artery disease, non-occlusive 11/14/2016   • Tobacco use 08/18/2016   • History of lupus 08/18/2016   • Vitamin B12 deficiency 08/15/2016   • Multiple pulmonary nodules 06/23/2016   • Fibromyalgia 06/23/2016   • GERD (gastroesophageal reflux disease) 06/23/2016   • Dyslipidemia 06/23/2016   • Insomnia 06/23/2016   • Anxiety 06/23/2016   • Vitamin D deficiency 06/23/2016   • Type 2 diabetes mellitus with complication, with long-term current use of insulin ([facility]) 09/21/2010       Allergies:Patient has no known allergies.    Current Outpatient Prescriptions   Medication Sig Dispense Refill   • Cholecalciferol (VITAMIN D3) 2000 units Tab Take 1 Tab by mouth every day. 30 Tab 3   • atorvastatin (LIPITOR) 40 MG Tab Take 1 Tab by mouth every day. 90 Tab 0   • Insulin Glargine (BASAGLAR KWIKPEN) 100 UNIT/ML Solution Pen-injector INJECT 30 UNITS AS INSTRUCTED EVERY EVENING. 15 PEN 0   • metformin (GLUCOPHAGE) 1000 MG tablet Take 1 Tab by mouth 2 times a day. (Patient taking differently: Take 500 mg by mouth 2 times a day.) 60 Tab 6   • FLUoxetine (PROZAC) 10 MG Cap Take 10 mg by mouth  "every day.     • metoprolol (LOPRESSOR) 25 MG Tab TAKE 1/2 TABLET BY MOUTH 2 TIMES A DAY. 90 Tab 3   • aripiprazole (ABILIFY) 2 MG tablet Take 2 mg by mouth.  1   • amitriptyline (ELAVIL) 25 MG Tab Take 1 Tab by mouth every day. 30 Tab 3   • trazodone (DESYREL) 100 MG Tab Take 1 Tab by mouth every day. 30 Tab 3   • Blood Glucose Monitoring Suppl (BLOOD GLUCOSE MONITOR SYSTEM) w/Device Kit 1 Device by Does not apply route 4 times a day. 1 Kit 0   • B-D ULTRAFINE III SHORT PEN 31G X 8 MM Misc USE WITH INSULIN AS INSTRUCTED ONCE A  Each 0   • lisinopril (PRINIVIL) 10 MG Tab Take 1 Tab by mouth every day. 30 Tab 3   • Lancet Devices (TRUEDRAW LANCING DEVICE) Misc by Does not apply route.     • aspirin 81 MG tablet Take 81 mg by mouth every morning. Starting 9/26        No current facility-administered medications for this visit.        Social History   Substance Use Topics   • Smoking status: Current Some Day Smoker     Packs/day: 0.25     Years: 40.00     Types: Cigarettes     Last attempt to quit: 4/1/2017   • Smokeless tobacco: Never Used      Comment: Infrequest ciggarette smoking.    • Alcohol use No       Family History   Problem Relation Age of Onset   • Heart Disease Father    • Stroke Father      Review of Systems:   Pertinent positives as stated in HPI, all others reviewed as negative.    Physical Exam:  Blood pressure 132/80, pulse 64, temperature 36.5 °C (97.7 °F), height 1.626 m (5' 4.02\"), weight 68.9 kg (152 lb), SpO2 96 %. Body mass index is 26.07 kg/m².    General Appearance: healthy, alert, no distress, cooperative  Skin: No rashes or lesions.  Head: Normocephalic. No masses appreciated.   Eyes: PERRLA.  Ears: External ears normal.  Nose/Sinuses: Nares normal. Mucosa normal.   Oropharynx: Lips, mucosa, and tongue normal. Teeth and gums normal. Oropharynx moist and without lesion.  Neck: Neck supple. No adenopathy.   Lungs: Lungs clear to auscultation bilaterally.  Heart: RRR without murmur, " gallop, or rubs.  Abdomen: Soft, non-tender. BS normal. No masses,  No organomegaly  Musculoskeletal: Extremities: Upper and lower extremities appear normal. No deformities, edema.   Peripheral Pulses: Pulses: radial=4/4, dorsalis pedis=4/4  Neurologic: Cranial nerves intact.   Psychiatric: Mood appears normal.     Assessment/Plan:     1. Annual physical exam  Patient is up-to-date on mammogram. Repeat in 2019.  Patient is up-to-date on colonoscopy. Due in 2027.  Patient is up-to-date on Pap smear. It was done in 2017. Due in 2022.  Patient saw the dentist and ophthalmologist last month.  She had lab work done which was also reviewed with the patient in April 2018. No need for additional labs at this time.  She is not up-to-date on her tetanus vaccination so we'll administer that today.  We also discussed diet and exercise. She is following a healthy lifestyle at this time.  - Tdap =>6yo IM    2. Type 2 diabetes mellitus with complication, unspecified whether long term insulin use (HCC)  Her diabetes is now controlled. Last hemoglobin A1c in April was 6.5.  She also had microalbumin creatinine levels checked and they were normal.  Continue on current regimen. She does follow up with endocrinologist.    3. Dyslipidemia  Continue on atorvastatin. Reviewed recent lipid profile and it was within normal limits.    4. Essential hypertension, benign  Blood pressure is stable. Continue on lisinopril and metoprolol.    5. Tobacco use  We discussed this at multiple visits. Patient has cut down on her cigarette smoking but continues to do so sometimes. Says she is working on it but when she encounters stress, she tends to reach for a cigarette. We will continue to work on this at every visit.    6. Need for Tdap vaccination  - Tdap =>6yo IM      Followup: Return in about 6 months (around 1/3/2019), or if symptoms worsen or fail to improve.    This note was created using voice recognition software. There may be unintended  errors spelling, and grammar or content.

## 2018-07-19 ENCOUNTER — HOSPITAL ENCOUNTER (OUTPATIENT)
Dept: RADIOLOGY | Facility: MEDICAL CENTER | Age: 63
End: 2018-07-19
Attending: INTERNAL MEDICINE
Payer: MEDICAID

## 2018-07-19 DIAGNOSIS — R91.8 LUNG NODULES: ICD-10-CM

## 2018-07-19 PROCEDURE — 71260 CT THORAX DX C+: CPT

## 2018-07-19 PROCEDURE — 700117 HCHG RX CONTRAST REV CODE 255: Performed by: INTERNAL MEDICINE

## 2018-07-19 RX ADMIN — IOHEXOL 75 ML: 350 INJECTION, SOLUTION INTRAVENOUS at 09:30

## 2018-07-25 RX ORDER — LISINOPRIL 10 MG/1
10 TABLET ORAL DAILY
Qty: 30 TAB | Refills: 3 | Status: ON HOLD | OUTPATIENT
Start: 2018-07-25 | End: 2018-11-15

## 2018-07-25 NOTE — TELEPHONE ENCOUNTER
Last seen: 7/3/18 by Dr. Newsome   Next appt: 9/25/18 with Dr. Newsome     Was the patient seen in the last year in this department? Yes   Does patient have an active prescription for medications requested? No   Received Request Via: Patient

## 2018-07-31 ENCOUNTER — TELEPHONE (OUTPATIENT)
Dept: INTERNAL MEDICINE | Facility: MEDICAL CENTER | Age: 63
End: 2018-07-31

## 2018-07-31 NOTE — TELEPHONE ENCOUNTER
----- Message from Miki Newsome M.D. sent at 7/28/2018  9:10 PM PDT -----  Please inform patient that adrenal nodule is unchanged, there are new pulmonary nodules. Will repeat at CT scan in 12 months. I will call her once I am back in office in 1 week. Thanks.

## 2018-08-06 NOTE — TELEPHONE ENCOUNTER
Called and spoke with pt. Notified pt of this. She understood. She said when Dr Newsome calls her okay to leave a detail message on v/m

## 2018-08-14 ENCOUNTER — OFFICE VISIT (OUTPATIENT)
Dept: CARDIOLOGY | Facility: MEDICAL CENTER | Age: 63
End: 2018-08-14
Payer: MEDICAID

## 2018-08-14 VITALS
HEIGHT: 64 IN | BODY MASS INDEX: 25.95 KG/M2 | RESPIRATION RATE: 14 BRPM | HEART RATE: 76 BPM | WEIGHT: 152 LBS | SYSTOLIC BLOOD PRESSURE: 128 MMHG | DIASTOLIC BLOOD PRESSURE: 80 MMHG | OXYGEN SATURATION: 94 %

## 2018-08-14 DIAGNOSIS — I25.10 CORONARY ARTERY DISEASE, NON-OCCLUSIVE: ICD-10-CM

## 2018-08-14 DIAGNOSIS — I10 ESSENTIAL HYPERTENSION, BENIGN: ICD-10-CM

## 2018-08-14 DIAGNOSIS — E78.5 DYSLIPIDEMIA: ICD-10-CM

## 2018-08-14 PROCEDURE — 99214 OFFICE O/P EST MOD 30 MIN: CPT | Performed by: INTERNAL MEDICINE

## 2018-08-14 RX ORDER — CALCIUM CITRATE/VITAMIN D3 200MG-6.25
TABLET ORAL
Refills: 6 | COMMUNITY
Start: 2018-08-08 | End: 2018-11-12

## 2018-08-14 RX ORDER — FLUOXETINE HYDROCHLORIDE 40 MG/1
40 CAPSULE ORAL DAILY
Refills: 1 | COMMUNITY
Start: 2018-06-22 | End: 2019-09-27

## 2018-08-14 RX ORDER — ACETAMINOPHEN 160 MG
1 TABLET,DISINTEGRATING ORAL
Refills: 3 | COMMUNITY
Start: 2018-07-24 | End: 2018-11-12

## 2018-08-14 RX ORDER — LANSOPRAZOLE 15 MG/1
CAPSULE, DELAYED RELEASE ORAL
Refills: 0 | Status: ON HOLD | COMMUNITY
Start: 2018-06-25 | End: 2018-11-15

## 2018-08-14 RX ORDER — METFORMIN HYDROCHLORIDE 500 MG/1
1000 TABLET, EXTENDED RELEASE ORAL
Refills: 6 | COMMUNITY
Start: 2018-07-24 | End: 2018-11-12

## 2018-08-14 ASSESSMENT — ENCOUNTER SYMPTOMS
PALPITATIONS: 0
DIZZINESS: 0
LOSS OF CONSCIOUSNESS: 0
COUGH: 0
MYALGIAS: 0
SHORTNESS OF BREATH: 0

## 2018-08-14 NOTE — PROGRESS NOTES
"Chief Complaint   Patient presents with   • Coronary Artery Disease       Subjective:   Mary Moore is a 62 y.o. female who presents today for follow-up evaluation of chest discomfort, CAD, hypertension and hyperlipidemia.     Last seen on 6/2/2017.    Since 6/2/2017 the patient had no cardiac symptoms.  Compliant with medications.  Continues to smoke.     Since 11/14/2016 appointment the patient has had recent recurrent jaw and chest pain with without exertion.  Continues to smoke cigarettes.  States he has a lot of \"stress\" at home.     Has occasional nocturnal chest discomfort.  However has chronic insomnia.  Continues to smoke cigarettes.  Missed her scheduled appointment with the \"lung doctor\".  Has a rescheduled appointment on 12/5/2016.     Past medical history  Back in August, 2016 the past 3-4 weeks the patient reports to me symptoms of substernal toothache-like chest discomfort and jaw pain and headache.  The symptoms occur with or without activity.  She also feels episodes of lightheadedness without palpitations.  She doesn't feel comfortable about driving.  General he doesn't feel well.  Under a lot of stress for various reasons.     In 2009, seen by Dr. Demetrius Rosen evaluation of anginal like symptoms.  Cardiac catheterization showed minimal coronary artery disease and an EF of 75%.  Medical therapy and smoking cessation was recommended.     The patient continues to smoke cigarettes.  She has significant history of hypertension, diabetes mellitus and hypercholesterolemia.    Past Medical History:   Diagnosis Date   • Anxiety 6/23/2016   • Chest pain 6/23/2016   • Constipation 6/23/2016   • Dyslipidemia 6/23/2016   • Epigastric pain 6/23/2016   • Facial rash 6/23/2016   • Fibromyalgia 6/23/2016   • GERD (gastroesophageal reflux disease) 6/23/2016   • HTN (hypertension) 6/23/2016   • Hypercalcemia 6/23/2016   • Hyperlipidemia    • Hypertension     bordeline    • Insomnia     chronic   • Lentigo " 6/23/2016   • Lupus erythematosus 6/23/2016   • Multiple pulmonary nodules 6/23/2016   • Urinary incontinence 6/23/2016   • Vitamin D deficiency 6/23/2016     Past Surgical History:   Procedure Laterality Date   • RECOVERY  8/25/2016    Procedure: CATH LAB-[facility] W/POSSIBLE-RITU;  Surgeon: Recoveryonly Surgery;  Location: SURGERY PRE-POST PROC UNIT [facility] [place];  Service:      Family History   Problem Relation Age of Onset   • Heart Disease Father    • Stroke Father      Social History     Social History   • Marital status:      Spouse name: N/A   • Number of children: N/A   • Years of education: N/A     Occupational History   • Not on file.     Social History Main Topics   • Smoking status: Current Some Day Smoker     Packs/day: 0.25     Years: 40.00     Types: Cigarettes     Last attempt to quit: 4/1/2017   • Smokeless tobacco: Never Used      Comment: Infrequest ciggarette smoking.    • Alcohol use No   • Drug use: No   • Sexual activity: Not Currently     Partners: Male     Other Topics Concern   • Not on file     Social History Narrative   • No narrative on file     No Known Allergies  Outpatient Encounter Prescriptions as of 8/14/2018   Medication Sig Dispense Refill   • PREVACID 24HR 15 MG CAPSULE DELAYED RELEASE TAKE 1 CAPSULE BY MOUTH TWICE A DAY BEFORE MEALS FOR 90 DAYS  0   • TRUE METRIX BLOOD GLUCOSE TEST strip USE TO TEST TWICE DAILY  6   • fluoxetine (PROZAC) 40 MG capsule Take 40 mg by mouth every day.  1   • metFORMIN (GLUCOPHAGE) 500 MG Tab Take 1,000 mg by mouth 2 times a day, with meals.     • lisinopril (PRINIVIL) 10 MG Tab Take 1 Tab by mouth every day. 30 Tab 3   • Cholecalciferol (VITAMIN D3) 2000 units Tab Take 1 Tab by mouth every day. 30 Tab 3   • atorvastatin (LIPITOR) 40 MG Tab Take 1 Tab by mouth every day. 90 Tab 0   • Insulin Glargine (BASAGLAR KWIKPEN) 100 UNIT/ML Solution Pen-injector INJECT 30 UNITS AS INSTRUCTED EVERY EVENING. (Patient taking differently: Inject 30-45 Units as  "instructed every evening.) 15 PEN 0   • FLUoxetine (PROZAC) 10 MG Cap Take 10 mg by mouth every day.     • metoprolol (LOPRESSOR) 25 MG Tab TAKE 1/2 TABLET BY MOUTH 2 TIMES A DAY. 90 Tab 3   • aripiprazole (ABILIFY) 2 MG tablet Take 2 mg by mouth every bedtime.  1   • amitriptyline (ELAVIL) 25 MG Tab Take 1 Tab by mouth every day. 30 Tab 3   • trazodone (DESYREL) 100 MG Tab Take 1 Tab by mouth every day. (Patient taking differently: Take 100 mg by mouth every bedtime.) 30 Tab 3   • Blood Glucose Monitoring Suppl (BLOOD GLUCOSE MONITOR SYSTEM) w/Device Kit 1 Device by Does not apply route 4 times a day. 1 Kit 0   • B-D ULTRAFINE III SHORT PEN 31G X 8 MM Misc USE WITH INSULIN AS INSTRUCTED ONCE A  Each 0   • Lancet Devices (TRUEDRAW LANCING DEVICE) Misc by Does not apply route.     • aspirin 81 MG tablet Take 81 mg by mouth every morning. Starting 9/26      • metFORMIN ER (GLUCOPHAGE XR) 500 MG TABLET SR 24 HR Take 1,000 mg by mouth.  6   • Cholecalciferol (VITAMIN D3) 2000 UNIT Cap Take 1 Cap by mouth every day.  3   • metformin (GLUCOPHAGE) 1000 MG tablet Take 1 Tab by mouth 2 times a day. (Patient not taking: Reported on 8/14/2018) 60 Tab 6     No facility-administered encounter medications on file as of 8/14/2018.      Review of Systems   Respiratory: Negative for cough and shortness of breath.    Cardiovascular: Negative for chest pain and palpitations.   Musculoskeletal: Negative for myalgias.   Neurological: Negative for dizziness and loss of consciousness.        Objective:   /80   Pulse 76   Resp 14   Ht 1.626 m (5' 4\")   Wt 68.9 kg (152 lb)   SpO2 94%   BMI 26.09 kg/m²     Physical Exam   Constitutional: She is oriented to person, place, and time. She appears well-developed and well-nourished.   Neck: No JVD present.   Cardiovascular: Normal rate, regular rhythm, normal heart sounds and intact distal pulses.    Pulmonary/Chest: Effort normal and breath sounds normal. No respiratory " distress. She has no wheezes. She has no rales.   Musculoskeletal: She exhibits no edema.   Neurological: She is alert and oriented to person, place, and time.   Skin: Skin is warm and dry.   Psychiatric: She has a normal mood and affect. Her behavior is normal.     08/08/2016 EKG: Normal sinus rhythm, rate 63. Reviewed by myself.     11/16/2009 ECHOCARDIOGRAM  EF 65%.  No valvular disease.  Pulmonary artery pressure 25-30 mmHg.     11/17/2009 CARDIAC CATHETERIZATION  EF 75%.  Minimal coronary artery disease.  Medical therapy and smoking cessation recommended.     DATE OF SERVICE:  08/25/2016  PROCEDURE:  Cardiac catheterization.  A.  Left heart catheterization.  B.  Left ventriculography.  C.  Selective coronary artery.  D.  Right radial artery approach.  1.  EF 64%.  Normal wall motion.  2.  Proximal LAD at 20% ostial.  3.  Ostial D1 50%-60%.     7/6/2016 laboratory reviewed.    06/13/2017 MPI   No reversible defects that would indicate ischemia.    Normal left ventricular size, ejection fraction, and wall motion.    ECG INTERPRETATION   Negative stress ECG for ischemia.    Assessment:     1. Coronary artery disease, non-occlusive     2. Essential hypertension, benign     3. Dyslipidemia         Medical Decision Making:  Today's Assessment / Status / Plan:   Chest jaw discomfort.     CAD.     Hypertension.       Hyperlipidemia.  On atorvastatin.     Diabetes mellitus.     Chronic tobacco use.     Insomnia/sleep disorder.     Polypharmacy.     Adult situational stress syndrome.     Recommendations/Discussion:  1.  The patient's current cardiac status is stable.  2.  I reviewed the results of her recent blood work which shows improvement of glycohemoglobin and will control lipid profile.  3.  Needs to stop smoking.  4.  Continue current cardiac therapy.  5.  Follow-up 6 months.

## 2018-08-17 NOTE — TELEPHONE ENCOUNTER
Last seen: 07/03/18 by Dr. Newsome  Next appt: 09/25/18 with Dr. Newsome    Was the patient seen in the last year in this department? Yes   Does patient have an active prescription for medications requested? No   Received Request Via: Pharmacy

## 2018-08-20 RX ORDER — ATORVASTATIN CALCIUM 40 MG/1
40 TABLET, FILM COATED ORAL
Qty: 90 TAB | Refills: 0 | Status: SHIPPED | OUTPATIENT
Start: 2018-08-20 | End: 2018-12-13 | Stop reason: SDUPTHER

## 2018-09-13 ENCOUNTER — HOSPITAL ENCOUNTER (OUTPATIENT)
Dept: LAB | Facility: MEDICAL CENTER | Age: 63
End: 2018-09-13
Attending: INTERNAL MEDICINE
Payer: MEDICAID

## 2018-09-13 PROCEDURE — 87116 MYCOBACTERIA CULTURE: CPT

## 2018-09-13 PROCEDURE — 87206 SMEAR FLUORESCENT/ACID STAI: CPT

## 2018-09-13 PROCEDURE — 87015 SPECIMEN INFECT AGNT CONCNTJ: CPT

## 2018-09-13 PROCEDURE — 87102 FUNGUS ISOLATION CULTURE: CPT

## 2018-09-14 ENCOUNTER — HOSPITAL ENCOUNTER (OUTPATIENT)
Facility: MEDICAL CENTER | Age: 63
End: 2018-09-14
Attending: INTERNAL MEDICINE
Payer: MEDICAID

## 2018-09-14 LAB
RHODAMINE-AURAMINE STN SPEC: NORMAL
SIGNIFICANT IND 70042: NORMAL
SITE SITE: NORMAL
SOURCE SOURCE: NORMAL

## 2018-09-14 PROCEDURE — 87206 SMEAR FLUORESCENT/ACID STAI: CPT

## 2018-09-14 PROCEDURE — 87116 MYCOBACTERIA CULTURE: CPT

## 2018-09-14 PROCEDURE — 87015 SPECIMEN INFECT AGNT CONCNTJ: CPT

## 2018-09-17 ENCOUNTER — HOSPITAL ENCOUNTER (OUTPATIENT)
Facility: MEDICAL CENTER | Age: 63
End: 2018-09-17
Attending: INTERNAL MEDICINE
Payer: MEDICAID

## 2018-09-17 PROCEDURE — 87206 SMEAR FLUORESCENT/ACID STAI: CPT

## 2018-09-17 PROCEDURE — 87015 SPECIMEN INFECT AGNT CONCNTJ: CPT

## 2018-09-17 PROCEDURE — 87116 MYCOBACTERIA CULTURE: CPT

## 2018-09-25 ENCOUNTER — OFFICE VISIT (OUTPATIENT)
Dept: INTERNAL MEDICINE | Facility: MEDICAL CENTER | Age: 63
End: 2018-09-25
Payer: MEDICAID

## 2018-09-25 VITALS
DIASTOLIC BLOOD PRESSURE: 80 MMHG | TEMPERATURE: 97.3 F | SYSTOLIC BLOOD PRESSURE: 148 MMHG | BODY MASS INDEX: 26.12 KG/M2 | OXYGEN SATURATION: 96 % | HEIGHT: 64 IN | HEART RATE: 60 BPM | WEIGHT: 153 LBS

## 2018-09-25 DIAGNOSIS — Z72.0 TOBACCO USE: ICD-10-CM

## 2018-09-25 DIAGNOSIS — Z79.4 TYPE 2 DIABETES MELLITUS WITH COMPLICATION, WITH LONG-TERM CURRENT USE OF INSULIN (HCC): ICD-10-CM

## 2018-09-25 DIAGNOSIS — R91.8 MULTIPLE PULMONARY NODULES: ICD-10-CM

## 2018-09-25 DIAGNOSIS — E11.8 TYPE 2 DIABETES MELLITUS WITH COMPLICATION, WITH LONG-TERM CURRENT USE OF INSULIN (HCC): ICD-10-CM

## 2018-09-25 DIAGNOSIS — E55.9 VITAMIN D DEFICIENCY: ICD-10-CM

## 2018-09-25 DIAGNOSIS — R19.6 HALITOSIS: ICD-10-CM

## 2018-09-25 DIAGNOSIS — I10 ESSENTIAL HYPERTENSION, BENIGN: ICD-10-CM

## 2018-09-25 DIAGNOSIS — G47.00 INSOMNIA, UNSPECIFIED TYPE: ICD-10-CM

## 2018-09-25 DIAGNOSIS — E78.5 DYSLIPIDEMIA: ICD-10-CM

## 2018-09-25 PROCEDURE — 99214 OFFICE O/P EST MOD 30 MIN: CPT | Performed by: INTERNAL MEDICINE

## 2018-09-25 NOTE — PROGRESS NOTES
Mary Moore is a 63 y.o. female who is here for routine follow up.    CC: Halitosis, Vitamin D def    HPI:    Patient is a 63-year-old female who is here for routine follow-up today. Patient has a history of type 2 diabetes for which she is following up with endocrinology now. She says she is on glargine 46 units now. Her last hemoglobin A1c was recently done and looked within side range. She did recently see cardiology for her coronary artery disease, hypertension, and dyslipidemia. And it was noted that her cardiac status is stable. She is to follow-up with a cardiologist in 6 months. In the meantime, she had a mammogram done which was normal. We also went over results of her CT scan of the chest. It showed that there were for new pulmonary nodules measuring up to 4.2 mm and that there were multiple small pulmonary nodules which were unchanged compared to previous. There was also a 10.4 mm left adrenal gland nodule which was unchanged. We talked about this in more detail and patient is interested in quitting smoking so she wants to try Chantix and see if she is able to quit.    She also has a history of bad breath. She has been getting multiple dental surgeries done because of the bad breath and she says she continues to have this so she is interested in seeing a gastroenterologist to see where this may be coming from. She just recently got her upper teeth removed and has dentures in place but she says there's been no change. She does medicated mouthwash as well and brushes her teeth regularly but there is been no change in relation to the bad breath.    She does also have a history of vitamin D deficiency for which she is on medication. She doesn't endorse any muscle aches or pains at this time. No falls or fractures. There is been no other changes in her medications and so last time she saw me.      No problem-specific Assessment & Plan notes found for this encounter.      She  has a past medical  history of Anxiety (6/23/2016); Chest pain (6/23/2016); Constipation (6/23/2016); Dyslipidemia (6/23/2016); Epigastric pain (6/23/2016); Facial rash (6/23/2016); Fibromyalgia (6/23/2016); GERD (gastroesophageal reflux disease) (6/23/2016); HTN (hypertension) (6/23/2016); Hypercalcemia (6/23/2016); Hyperlipidemia; Hypertension; Insomnia; Lentigo (6/23/2016); Lupus erythematosus (6/23/2016); Multiple pulmonary nodules (6/23/2016); Urinary incontinence (6/23/2016); and Vitamin D deficiency (6/23/2016). She also has no past medical history of Breast cancer ([facility]).    Patient Active Problem List    Diagnosis Date Noted   • Adult respiratory stress syndrome (HCC) 06/02/2017   • Precordial pain 06/02/2017   • Screening mammogram, encounter for 11/29/2016   • Essential hypertension, benign 11/14/2016   • Coronary artery disease, non-occlusive 11/14/2016   • Tobacco use 08/18/2016   • History of lupus 08/18/2016   • Vitamin B12 deficiency 08/15/2016   • Multiple pulmonary nodules 06/23/2016   • Fibromyalgia 06/23/2016   • GERD (gastroesophageal reflux disease) 06/23/2016   • Dyslipidemia 06/23/2016   • Insomnia 06/23/2016   • Anxiety 06/23/2016   • Vitamin D deficiency 06/23/2016   • Type 2 diabetes mellitus with complication, with long-term current use of insulin ([facility]) 09/21/2010       Allergies:Patient has no known allergies.    Current Outpatient Prescriptions   Medication Sig Dispense Refill   • varenicline (CHANTIX ABEBE) 0.5 MG X 11 & 1 MG X 42 tablet Start medication 1 week before quit date. Take 0.5mg daily and then increase to 0.5mg twice daily. 56 Tab 3   • atorvastatin (LIPITOR) 40 MG Tab Take 1 Tab by mouth every day. 90 Tab 0   • PREVACID 24HR 15 MG CAPSULE DELAYED RELEASE TAKE 1 CAPSULE BY MOUTH TWICE A DAY BEFORE MEALS FOR 90 DAYS  0   • TRUE METRIX BLOOD GLUCOSE TEST strip USE TO TEST TWICE DAILY  6   • fluoxetine (PROZAC) 40 MG capsule Take 40 mg by mouth every day.  1   • Cholecalciferol (VITAMIN D3) 2000 UNIT  Cap Take 1 Cap by mouth every day.  3   • metFORMIN (GLUCOPHAGE) 500 MG Tab Take 1,000 mg by mouth 2 times a day, with meals.     • lisinopril (PRINIVIL) 10 MG Tab Take 1 Tab by mouth every day. 30 Tab 3   • Cholecalciferol (VITAMIN D3) 2000 units Tab Take 1 Tab by mouth every day. 30 Tab 3   • Insulin Glargine (BASAGLAR KWIKPEN) 100 UNIT/ML Solution Pen-injector INJECT 30 UNITS AS INSTRUCTED EVERY EVENING. (Patient taking differently: Inject 30-45 Units as instructed every evening.) 15 PEN 0   • metformin (GLUCOPHAGE) 1000 MG tablet Take 1 Tab by mouth 2 times a day. 60 Tab 6   • metoprolol (LOPRESSOR) 25 MG Tab TAKE 1/2 TABLET BY MOUTH 2 TIMES A DAY. 90 Tab 3   • aripiprazole (ABILIFY) 2 MG tablet Take 2 mg by mouth every bedtime.  1   • amitriptyline (ELAVIL) 25 MG Tab Take 1 Tab by mouth every day. 30 Tab 3   • trazodone (DESYREL) 100 MG Tab Take 1 Tab by mouth every day. (Patient taking differently: Take 100 mg by mouth every bedtime.) 30 Tab 3   • Blood Glucose Monitoring Suppl (BLOOD GLUCOSE MONITOR SYSTEM) w/Device Kit 1 Device by Does not apply route 4 times a day. 1 Kit 0   • B-D ULTRAFINE III SHORT PEN 31G X 8 MM Misc USE WITH INSULIN AS INSTRUCTED ONCE A  Each 0   • Lancet Devices (TRUEDRAW LANCING DEVICE) Misc by Does not apply route.     • aspirin 81 MG tablet Take 81 mg by mouth every morning. Starting 9/26      • metFORMIN ER (GLUCOPHAGE XR) 500 MG TABLET SR 24 HR Take 1,000 mg by mouth.  6   • FLUoxetine (PROZAC) 10 MG Cap Take 10 mg by mouth every day.       No current facility-administered medications for this visit.        Social History   Substance Use Topics   • Smoking status: Current Some Day Smoker     Packs/day: 0.25     Years: 40.00     Types: Cigarettes     Last attempt to quit: 4/1/2017   • Smokeless tobacco: Never Used      Comment: Infrequest ciggarette smoking.    • Alcohol use No       Family History   Problem Relation Age of Onset   • Heart Disease Father    • Stroke Father    "    Review of Systems:   Pertinent positives as stated in HPI, all others reviewed as negative.    Physical Exam:  Blood pressure 148/80, pulse 60, temperature 36.3 °C (97.3 °F), temperature source Temporal, height 1.626 m (5' 4\"), weight 69.4 kg (153 lb), SpO2 96 %, not currently breastfeeding. Body mass index is 26.26 kg/m².    General Appearance: healthy, alert, no distress, cooperative  Skin: Skin color, texture, turgor normal. No rashes or lesions.  Head: Normocephalic. No masses appreciated.   Eyes: PERRLA.  Oropharynx: Oropharynx moist and without lesion.  Neck: Neck supple. No adenopathy.   Lungs:  Lungs clear to auscultation bilaterally.  Heart: RRR without murmur, gallop, or rubs.  Abdomen: Soft, non-tender. BS normal.   Musculoskeletal: Extremities: Upper and lower extremities appear normal. No deformities, edema.   Peripheral Pulses: Pulses: radial=4/4, dorsalis pedis=4/4  Psychiatric: Mood appears normal.     Assessment/Plan:     1. Vitamin D deficiency  Continue on Vitamin D supplementation.  Check vitamin D level 3 months from previous date.   - VITAMIN D,25 HYDROXY; Future    2. Type 2 diabetes mellitus with complication, with long-term current use of insulin (HCC)  Sees Endo.  Last HgbA1c was 6.5.  Repeat labs given.   - HEMOGLOBIN A1C; Future    3. Halitosis  Had periodontal surgery. Uses medicated mouthwash. Still no improvement.  Will make a referral to GI to see if there might be another cause.   - REFERRAL TO GASTROENTEROLOGY    4. Tobacco use  Interested in quitting.  Will prescribe Chantix.     5. Dyslipidemia  Continue on Atorvastatin.  No other complaints.     6. Essential hypertension, benign  BP today is 148/80.  Sees Cardio as well.  Will not make any changes today.  Continue medications as prescribed.  Patient recommended to keep a log.     7. Multiple pulmonary nodules  Recent CT chest reviewed with the patient showing new nodules as well.  Will repeat 7/2019.     8. Insomnia, " unspecified type  Takes Trazodone. Stable.  No concerns.     Followup: Return in about 3 months (around 12/25/2018), or if symptoms worsen or fail to improve.    This note was created using voice recognition software. There may be unintended errors spelling, and grammar or content.

## 2018-10-09 LAB
FUNGUS SPEC CULT: NORMAL
SIGNIFICANT IND 70042: NORMAL
SITE SITE: NORMAL
SOURCE SOURCE: NORMAL

## 2018-10-09 RX ORDER — INSULIN GLARGINE 100 [IU]/ML
30-45 INJECTION, SOLUTION SUBCUTANEOUS EVERY EVENING
Qty: 15 PEN | Refills: 3 | Status: SHIPPED | OUTPATIENT
Start: 2018-10-09 | End: 2018-11-12

## 2018-10-09 NOTE — TELEPHONE ENCOUNTER
Last seen: 9/25/18 by Dr. Newsome   Next appt: 1/8/19 with Dr. Newsome     Was the patient seen in the last year in this department? Yes   Does patient have an active prescription for medications requested? No   Received Request Via: Pharmacy/ Patient     Patient is out.  Patient stated pharmacy has been faxing us a request since 9/27/18.  Can we please send this in today.

## 2018-11-08 ENCOUNTER — HOSPITAL ENCOUNTER (EMERGENCY)
Facility: MEDICAL CENTER | Age: 63
End: 2018-11-08
Attending: EMERGENCY MEDICINE
Payer: MEDICAID

## 2018-11-08 VITALS
TEMPERATURE: 99.3 F | HEIGHT: 66 IN | BODY MASS INDEX: 24.94 KG/M2 | HEART RATE: 75 BPM | WEIGHT: 155.2 LBS | OXYGEN SATURATION: 98 % | DIASTOLIC BLOOD PRESSURE: 99 MMHG | RESPIRATION RATE: 18 BRPM | SYSTOLIC BLOOD PRESSURE: 200 MMHG

## 2018-11-08 DIAGNOSIS — R51.9 SINUS HEADACHE: ICD-10-CM

## 2018-11-08 DIAGNOSIS — J01.10 ACUTE FRONTAL SINUSITIS, RECURRENCE NOT SPECIFIED: ICD-10-CM

## 2018-11-08 LAB
MYCOBACTERIUM SPEC CULT: NORMAL
RHODAMINE-AURAMINE STN SPEC: NORMAL
SIGNIFICANT IND 70042: NORMAL
SITE SITE: NORMAL
SOURCE SOURCE: NORMAL

## 2018-11-08 PROCEDURE — 99284 EMERGENCY DEPT VISIT MOD MDM: CPT

## 2018-11-08 PROCEDURE — 700102 HCHG RX REV CODE 250 W/ 637 OVERRIDE(OP): Performed by: EMERGENCY MEDICINE

## 2018-11-08 PROCEDURE — A9270 NON-COVERED ITEM OR SERVICE: HCPCS | Performed by: EMERGENCY MEDICINE

## 2018-11-08 RX ORDER — AMOXICILLIN 500 MG/1
500 CAPSULE ORAL ONCE
Status: COMPLETED | OUTPATIENT
Start: 2018-11-08 | End: 2018-11-08

## 2018-11-08 RX ORDER — ACETAMINOPHEN 325 MG/1
650 TABLET ORAL ONCE
Status: COMPLETED | OUTPATIENT
Start: 2018-11-08 | End: 2018-11-08

## 2018-11-08 RX ORDER — AMOXICILLIN 500 MG/1
500 CAPSULE ORAL 3 TIMES DAILY
Qty: 30 CAP | Refills: 0 | Status: ON HOLD | OUTPATIENT
Start: 2018-11-08 | End: 2018-11-15

## 2018-11-08 RX ORDER — IBUPROFEN 600 MG/1
600 TABLET ORAL ONCE
Status: COMPLETED | OUTPATIENT
Start: 2018-11-08 | End: 2018-11-08

## 2018-11-08 RX ORDER — FLUTICASONE PROPIONATE 50 MCG
2 SPRAY, SUSPENSION (ML) NASAL 2 TIMES DAILY
Qty: 1 BOTTLE | Refills: 1 | Status: ON HOLD | OUTPATIENT
Start: 2018-11-08 | End: 2018-11-15

## 2018-11-08 RX ADMIN — ACETAMINOPHEN 650 MG: 325 TABLET, FILM COATED ORAL at 16:04

## 2018-11-08 RX ADMIN — AMOXICILLIN 500 MG: 500 CAPSULE ORAL at 16:04

## 2018-11-08 RX ADMIN — IBUPROFEN 600 MG: 600 TABLET, FILM COATED ORAL at 16:04

## 2018-11-08 NOTE — ED TRIAGE NOTES
"Pt was seen at urgent care on tuesday for right ear pain, \"flushed ear\". Pt now c/o right face pain and right eye pain/pressure. Pt hypertensive in triage, states has high BP and takes medications. No neuro deficits noted. Pt denies headache.   "

## 2018-11-08 NOTE — ED PROVIDER NOTES
"ED Provider Note    Scribed for Karson Denton M.D. by Sterling Hernandez. 11/8/2018  3:40 PM    Primary care provider: Miki Newsome M.D.  Means of arrival: Walk in  History obtained from: Patient  History limited by: None    CHIEF COMPLAINT  Chief Complaint   Patient presents with   • Facial Pain       HPI  Mary Moore is a 63 y.o. female who presents to the Emergency Department complaining of right side facial pain that began yesterday. Patient was seen at  2 days ago for headache and right ear pain and they \"flushed her ear\" and now she has ringing in her head and pain on the right side of her face. Patient is also complaining of chills and vertigo however she denies any rhinorrhea, congestion, or cough. Patient takes a normal aspirin daily but has not taken any medication to relieve her pain.      REVIEW OF SYSTEMS  Pertinent positives include right side facial pain, headache, right ear ache, chills, vertigo. Pertinent negatives include no rhinorrhea, congestion, or cough.      PAST MEDICAL HISTORY   has a past medical history of Anxiety (6/23/2016); Chest pain (6/23/2016); Constipation (6/23/2016); Dyslipidemia (6/23/2016); Epigastric pain (6/23/2016); Facial rash (6/23/2016); Fibromyalgia (6/23/2016); GERD (gastroesophageal reflux disease) (6/23/2016); HTN (hypertension) (6/23/2016); Hypercalcemia (6/23/2016); Hyperlipidemia; Hypertension; Insomnia; Lentigo (6/23/2016); Lupus erythematosus (6/23/2016); Multiple pulmonary nodules (6/23/2016); Urinary incontinence (6/23/2016); and Vitamin D deficiency (6/23/2016).    SURGICAL HISTORY   has a past surgical history that includes recovery (8/25/2016).    SOCIAL HISTORY  Social History   Substance Use Topics   • Smoking status: Current Some Day Smoker     Packs/day: 0.25     Years: 40.00     Types: Cigarettes     Last attempt to quit: 4/1/2017   • Smokeless tobacco: Never Used      Comment: Infrequest ciggarette smoking.    • Alcohol use No      History "   Drug Use No       FAMILY HISTORY  Family History   Problem Relation Age of Onset   • Heart Disease Father    • Stroke Father        CURRENT MEDICATIONS  No current facility-administered medications on file prior to encounter.      Current Outpatient Prescriptions on File Prior to Encounter   Medication Sig Dispense Refill   • Insulin Glargine (BASAGLAR KWIKPEN) 100 UNIT/ML Solution Pen-injector Inject 30-45 Units as instructed every evening. 15 PEN 3   • varenicline (CHANTIX ABEBE) 0.5 MG X 11 & 1 MG X 42 tablet Start medication 1 week before quit date. Take 0.5mg daily and then increase to 0.5mg twice daily. 56 Tab 3   • atorvastatin (LIPITOR) 40 MG Tab Take 1 Tab by mouth every day. 90 Tab 0   • metFORMIN ER (GLUCOPHAGE XR) 500 MG TABLET SR 24 HR Take 1,000 mg by mouth.  6   • PREVACID 24HR 15 MG CAPSULE DELAYED RELEASE TAKE 1 CAPSULE BY MOUTH TWICE A DAY BEFORE MEALS FOR 90 DAYS  0   • TRUE METRIX BLOOD GLUCOSE TEST strip USE TO TEST TWICE DAILY  6   • fluoxetine (PROZAC) 40 MG capsule Take 40 mg by mouth every day.  1   • Cholecalciferol (VITAMIN D3) 2000 UNIT Cap Take 1 Cap by mouth every day.  3   • metFORMIN (GLUCOPHAGE) 500 MG Tab Take 1,000 mg by mouth 2 times a day, with meals.     • lisinopril (PRINIVIL) 10 MG Tab Take 1 Tab by mouth every day. 30 Tab 3   • Cholecalciferol (VITAMIN D3) 2000 units Tab Take 1 Tab by mouth every day. 30 Tab 3   • metformin (GLUCOPHAGE) 1000 MG tablet Take 1 Tab by mouth 2 times a day. 60 Tab 6   • FLUoxetine (PROZAC) 10 MG Cap Take 10 mg by mouth every day.     • metoprolol (LOPRESSOR) 25 MG Tab TAKE 1/2 TABLET BY MOUTH 2 TIMES A DAY. 90 Tab 3   • aripiprazole (ABILIFY) 2 MG tablet Take 2 mg by mouth every bedtime.  1   • amitriptyline (ELAVIL) 25 MG Tab Take 1 Tab by mouth every day. 30 Tab 3   • trazodone (DESYREL) 100 MG Tab Take 1 Tab by mouth every day. (Patient taking differently: Take 100 mg by mouth every bedtime.) 30 Tab 3   • Blood Glucose Monitoring Suppl (BLOOD  "GLUCOSE MONITOR SYSTEM) w/Device Kit 1 Device by Does not apply route 4 times a day. 1 Kit 0   • B-D ULTRAFINE III SHORT PEN 31G X 8 MM Misc USE WITH INSULIN AS INSTRUCTED ONCE A  Each 0   • Lancet Devices (TRUEDRAW LANCING DEVICE) Misc by Does not apply route.     • aspirin 81 MG tablet Take 81 mg by mouth every morning. Starting 9/26         ALLERGIES  No Known Allergies    PHYSICAL EXAM  VITAL SIGNS: BP (!) 200/112   Pulse 79   Temp 36.6 °C (97.8 °F)   Resp 17   Ht 1.676 m (5' 6\")   Wt 70.4 kg (155 lb 3.3 oz)   SpO2 99%   BMI 25.05 kg/m²     Constitutional: Well developed, Well nourished, mild distress, Non-toxic appearance.   HENT: Tenderness throughout her right frontal and maxillary sinus. Fluid behind bilateral TM right greater than left. Normocephalic, Atraumatic. Oropharynx moist.   Eyes: PERRL, EOMI, Conjunctiva normal, No discharge.   Neck: No nuchal rigidity. No anterior cervical lymphadenopathy  CV: Good pulses  Thorax & Lungs: No respiratory distress.   Skin: Warm, Dry, No erythema, No rash.    Musculoskeletal: No major deformities noted.   Neurologic: Awake, alert. Moves all extremities spontaneously.  Psychiatric: Affect normal, Mood normal.         COURSE & MEDICAL DECISION MAKING  Pertinent Labs & Imaging studies reviewed. (See chart for details)    3:40 PM - Patient seen and examined at bedside. I advised the patient that this is could be a sinus infection which she can treat with medication at home. I informed her that the medication will take a few days to resolve her symptoms but if she does not improve within a week to return to the ED or her PCP for evaluation. Patient understands and agrees to the discharge plan of care.    Decision Making:  Patient with nonspecific right-sided facial pain, she does have a very elevated blood pressure but I believe the patient's facial pain is secondary to congestion, the patient has fluid behind bilateral TMs, she has a long-standing history " of hypertension, does take her medicines, she has no other neurologic signs or symptoms.  I believe the patient's elevated blood pressure secondary to pain, discussed with her to use Flonase, Tylenol, ibuprofen, will give the patient a prescription for amoxicillin in case there is any infection involved, will have the patient return the next 1-2 days if not improved, return sooner with increasing pain numbness tingling or any other concerns.    The patient will return for new or worsening symptoms and is stable at the time of discharge.      DISPOSITION:  Patient will be discharged home in stable condition.    FOLLOW UP:  [facility], Emergency Dept  [address] [address] [phone]    If symptoms worsen      OUTPATIENT MEDICATIONS:  Discharge Medication List as of 11/8/2018  4:15 PM      START taking these medications    Details   amoxicillin (AMOXIL) 500 MG Cap Take 1 Cap by mouth 3 times a day for 7 days., Disp-30 Cap, R-0, Normal      fluticasone (FLONASE) 50 MCG/ACT nasal spray Spray 2 Sprays in nose 2 times a day., Disp-1 Bottle, R-1, Normal              FINAL IMPRESSION  1. Sinus headache    2. Acute frontal sinusitis, recurrence not specified          Sterling DENNIS (Scribe), am scribing for, and in the presence of, Karson Denton M.D..    Electronically signed by: Sterling Hernandez (Scribe), 11/8/2018    IKarson M.D. personally performed the services described in this documentation, as scribed by Sterling Heranndez in my presence, and it is both accurate and complete. E.    The note accurately reflects work and decisions made by me.  Karson Denton  11/8/2018  5:50 PM

## 2018-11-09 ENCOUNTER — TELEPHONE (OUTPATIENT)
Dept: INTERNAL MEDICINE | Facility: MEDICAL CENTER | Age: 63
End: 2018-11-09

## 2018-11-09 NOTE — ED NOTES
Blood pressure noted at 200/99. Dr Thompson called regarding blood pressure. Dr Denton okay with patient discharging and following up with PCP about blood pressure. Pt will take bp meds tonight

## 2018-11-09 NOTE — TELEPHONE ENCOUNTER
VOICEMAIL  1. Caller Name: Mayr Moore                        Call Back Number: [phone] (home)       2. Message: Wants to see you today, she has ear ache.  She was see last time for ear pain on her L side and now its her R side.  Her face is also starting to hurt.     3. Patient approves office to leave a detailed voicemail/MyChart message: N\A    Please advise

## 2018-11-11 NOTE — TELEPHONE ENCOUNTER
Please advise patient that I was rounding and if she can see one of the residents for this concern or another provider, that might be best. Thanks.

## 2018-11-12 ENCOUNTER — HOSPITAL ENCOUNTER (INPATIENT)
Facility: MEDICAL CENTER | Age: 63
LOS: 2 days | DRG: 125 | End: 2018-11-15
Attending: EMERGENCY MEDICINE | Admitting: INTERNAL MEDICINE
Payer: MEDICARE

## 2018-11-12 DIAGNOSIS — I10 ESSENTIAL HYPERTENSION, BENIGN: ICD-10-CM

## 2018-11-12 DIAGNOSIS — B02.30 HERPES ZOSTER OPHTHALMICUS OF RIGHT EYE: ICD-10-CM

## 2018-11-12 DIAGNOSIS — Z72.0 TOBACCO USE: ICD-10-CM

## 2018-11-12 PROBLEM — R52 PAIN: Status: ACTIVE | Noted: 2018-11-12

## 2018-11-12 LAB
ALBUMIN SERPL BCP-MCNC: 3.9 G/DL (ref 3.2–4.9)
ALBUMIN/GLOB SERPL: 1.3 G/DL
ALP SERPL-CCNC: 55 U/L (ref 30–99)
ALT SERPL-CCNC: 16 U/L (ref 2–50)
ANION GAP SERPL CALC-SCNC: 8 MMOL/L (ref 0–11.9)
AST SERPL-CCNC: 15 U/L (ref 12–45)
BASOPHILS # BLD AUTO: 0.2 % (ref 0–1.8)
BASOPHILS # BLD: 0.01 K/UL (ref 0–0.12)
BILIRUB SERPL-MCNC: 0.3 MG/DL (ref 0.1–1.5)
BUN SERPL-MCNC: 11 MG/DL (ref 8–22)
CALCIUM SERPL-MCNC: 9.1 MG/DL (ref 8.5–10.5)
CHLORIDE SERPL-SCNC: 107 MMOL/L (ref 96–112)
CO2 SERPL-SCNC: 24 MMOL/L (ref 20–33)
CREAT SERPL-MCNC: 0.64 MG/DL (ref 0.5–1.4)
EOSINOPHIL # BLD AUTO: 0.26 K/UL (ref 0–0.51)
EOSINOPHIL NFR BLD: 5.1 % (ref 0–6.9)
ERYTHROCYTE [DISTWIDTH] IN BLOOD BY AUTOMATED COUNT: 46.6 FL (ref 35.9–50)
GLOBULIN SER CALC-MCNC: 2.9 G/DL (ref 1.9–3.5)
GLUCOSE BLD-MCNC: 189 MG/DL (ref 65–99)
GLUCOSE SERPL-MCNC: 119 MG/DL (ref 65–99)
HCT VFR BLD AUTO: 39.5 % (ref 37–47)
HGB BLD-MCNC: 13.5 G/DL (ref 12–16)
IMM GRANULOCYTES # BLD AUTO: 0.01 K/UL (ref 0–0.11)
IMM GRANULOCYTES NFR BLD AUTO: 0.2 % (ref 0–0.9)
LYMPHOCYTES # BLD AUTO: 0.73 K/UL (ref 1–4.8)
LYMPHOCYTES NFR BLD: 14.4 % (ref 22–41)
MCH RBC QN AUTO: 31.3 PG (ref 27–33)
MCHC RBC AUTO-ENTMCNC: 34.2 G/DL (ref 33.6–35)
MCV RBC AUTO: 91.4 FL (ref 81.4–97.8)
MONOCYTES # BLD AUTO: 0.37 K/UL (ref 0–0.85)
MONOCYTES NFR BLD AUTO: 7.3 % (ref 0–13.4)
NEUTROPHILS # BLD AUTO: 3.69 K/UL (ref 2–7.15)
NEUTROPHILS NFR BLD: 72.8 % (ref 44–72)
NRBC # BLD AUTO: 0 K/UL
NRBC BLD-RTO: 0 /100 WBC
PLATELET # BLD AUTO: 228 K/UL (ref 164–446)
PMV BLD AUTO: 10 FL (ref 9–12.9)
POTASSIUM SERPL-SCNC: 4 MMOL/L (ref 3.6–5.5)
PROT SERPL-MCNC: 6.8 G/DL (ref 6–8.2)
RBC # BLD AUTO: 4.32 M/UL (ref 4.2–5.4)
SODIUM SERPL-SCNC: 139 MMOL/L (ref 135–145)
WBC # BLD AUTO: 5.1 K/UL (ref 4.8–10.8)

## 2018-11-12 PROCEDURE — 700105 HCHG RX REV CODE 258: Performed by: EMERGENCY MEDICINE

## 2018-11-12 PROCEDURE — 85025 COMPLETE CBC W/AUTO DIFF WBC: CPT

## 2018-11-12 PROCEDURE — 700102 HCHG RX REV CODE 250 W/ 637 OVERRIDE(OP): Performed by: STUDENT IN AN ORGANIZED HEALTH CARE EDUCATION/TRAINING PROGRAM

## 2018-11-12 PROCEDURE — 83036 HEMOGLOBIN GLYCOSYLATED A1C: CPT

## 2018-11-12 PROCEDURE — 99285 EMERGENCY DEPT VISIT HI MDM: CPT

## 2018-11-12 PROCEDURE — 700111 HCHG RX REV CODE 636 W/ 250 OVERRIDE (IP): Performed by: EMERGENCY MEDICINE

## 2018-11-12 PROCEDURE — 700111 HCHG RX REV CODE 636 W/ 250 OVERRIDE (IP): Performed by: INTERNAL MEDICINE

## 2018-11-12 PROCEDURE — 96375 TX/PRO/DX INJ NEW DRUG ADDON: CPT

## 2018-11-12 PROCEDURE — A9270 NON-COVERED ITEM OR SERVICE: HCPCS | Performed by: STUDENT IN AN ORGANIZED HEALTH CARE EDUCATION/TRAINING PROGRAM

## 2018-11-12 PROCEDURE — 700101 HCHG RX REV CODE 250: Performed by: EMERGENCY MEDICINE

## 2018-11-12 PROCEDURE — 99222 1ST HOSP IP/OBS MODERATE 55: CPT | Mod: GC | Performed by: INTERNAL MEDICINE

## 2018-11-12 PROCEDURE — 80053 COMPREHEN METABOLIC PANEL: CPT

## 2018-11-12 PROCEDURE — G0378 HOSPITAL OBSERVATION PER HR: HCPCS

## 2018-11-12 PROCEDURE — 700102 HCHG RX REV CODE 250 W/ 637 OVERRIDE(OP): Performed by: INTERNAL MEDICINE

## 2018-11-12 PROCEDURE — A9270 NON-COVERED ITEM OR SERVICE: HCPCS | Performed by: INTERNAL MEDICINE

## 2018-11-12 PROCEDURE — 36415 COLL VENOUS BLD VENIPUNCTURE: CPT

## 2018-11-12 PROCEDURE — 96365 THER/PROPH/DIAG IV INF INIT: CPT

## 2018-11-12 PROCEDURE — 700101 HCHG RX REV CODE 250

## 2018-11-12 PROCEDURE — 82962 GLUCOSE BLOOD TEST: CPT

## 2018-11-12 RX ORDER — GABAPENTIN 300 MG/1
300 CAPSULE ORAL 3 TIMES DAILY
Status: DISCONTINUED | OUTPATIENT
Start: 2018-11-13 | End: 2018-11-13

## 2018-11-12 RX ORDER — PREDNISOLONE ACETATE 10 MG/ML
1 SUSPENSION/ DROPS OPHTHALMIC ONCE
Status: COMPLETED | OUTPATIENT
Start: 2018-11-12 | End: 2018-11-12

## 2018-11-12 RX ORDER — OXYCODONE HYDROCHLORIDE 5 MG/1
5 TABLET ORAL EVERY 4 HOURS PRN
Status: DISCONTINUED | OUTPATIENT
Start: 2018-11-12 | End: 2018-11-12

## 2018-11-12 RX ORDER — ARIPIPRAZOLE 2 MG/1
2 TABLET ORAL
Status: DISCONTINUED | OUTPATIENT
Start: 2018-11-12 | End: 2018-11-15 | Stop reason: HOSPADM

## 2018-11-12 RX ORDER — MORPHINE SULFATE 4 MG/ML
1 INJECTION, SOLUTION INTRAMUSCULAR; INTRAVENOUS EVERY 4 HOURS PRN
Status: DISCONTINUED | OUTPATIENT
Start: 2018-11-12 | End: 2018-11-13

## 2018-11-12 RX ORDER — GABAPENTIN 100 MG/1
200 CAPSULE ORAL 3 TIMES DAILY
Status: DISCONTINUED | OUTPATIENT
Start: 2018-11-12 | End: 2018-11-12

## 2018-11-12 RX ORDER — PREDNISOLONE ACETATE 10 MG/ML
1 SUSPENSION/ DROPS OPHTHALMIC 4 TIMES DAILY
Status: DISCONTINUED | OUTPATIENT
Start: 2018-11-12 | End: 2018-11-15 | Stop reason: HOSPADM

## 2018-11-12 RX ORDER — BISACODYL 10 MG
10 SUPPOSITORY, RECTAL RECTAL
Status: DISCONTINUED | OUTPATIENT
Start: 2018-11-12 | End: 2018-11-15 | Stop reason: HOSPADM

## 2018-11-12 RX ORDER — TRAZODONE HYDROCHLORIDE 100 MG/1
100 TABLET ORAL
COMMUNITY
End: 2019-01-08

## 2018-11-12 RX ORDER — POLYETHYLENE GLYCOL 3350 17 G/17G
1 POWDER, FOR SOLUTION ORAL
Status: DISCONTINUED | OUTPATIENT
Start: 2018-11-12 | End: 2018-11-15 | Stop reason: HOSPADM

## 2018-11-12 RX ORDER — ERYTHROMYCIN 5 MG/G
OINTMENT OPHTHALMIC ONCE
Status: COMPLETED | OUTPATIENT
Start: 2018-11-12 | End: 2018-11-12

## 2018-11-12 RX ORDER — ATORVASTATIN CALCIUM 40 MG/1
40 TABLET, FILM COATED ORAL
Status: DISCONTINUED | OUTPATIENT
Start: 2018-11-12 | End: 2018-11-15 | Stop reason: HOSPADM

## 2018-11-12 RX ORDER — OXYCODONE HYDROCHLORIDE 5 MG/1
5 TABLET ORAL EVERY 4 HOURS
Status: DISCONTINUED | OUTPATIENT
Start: 2018-11-12 | End: 2018-11-13

## 2018-11-12 RX ORDER — PROPARACAINE HYDROCHLORIDE 5 MG/ML
1 SOLUTION/ DROPS OPHTHALMIC ONCE
Status: COMPLETED | OUTPATIENT
Start: 2018-11-12 | End: 2018-11-12

## 2018-11-12 RX ORDER — PREDNISONE 20 MG/1
60 TABLET ORAL DAILY
Status: DISCONTINUED | OUTPATIENT
Start: 2018-11-12 | End: 2018-11-15 | Stop reason: HOSPADM

## 2018-11-12 RX ORDER — AMOXICILLIN 250 MG
2 CAPSULE ORAL 2 TIMES DAILY
Status: DISCONTINUED | OUTPATIENT
Start: 2018-11-12 | End: 2018-11-15 | Stop reason: HOSPADM

## 2018-11-12 RX ORDER — ACYCLOVIR 800 MG/1
800 TABLET ORAL
Status: DISCONTINUED | OUTPATIENT
Start: 2018-11-12 | End: 2018-11-13

## 2018-11-12 RX ORDER — PROPARACAINE HYDROCHLORIDE 5 MG/ML
SOLUTION/ DROPS OPHTHALMIC
Status: COMPLETED
Start: 2018-11-12 | End: 2018-11-12

## 2018-11-12 RX ORDER — LABETALOL HYDROCHLORIDE 5 MG/ML
10 INJECTION, SOLUTION INTRAVENOUS EVERY 4 HOURS PRN
Status: DISCONTINUED | OUTPATIENT
Start: 2018-11-12 | End: 2018-11-15 | Stop reason: HOSPADM

## 2018-11-12 RX ORDER — INSULIN GLARGINE 100 [IU]/ML
0.2 INJECTION, SOLUTION SUBCUTANEOUS EVERY EVENING
Status: DISCONTINUED | OUTPATIENT
Start: 2018-11-12 | End: 2018-11-15 | Stop reason: HOSPADM

## 2018-11-12 RX ORDER — INSULIN GLARGINE 100 [IU]/ML
46 INJECTION, SOLUTION SUBCUTANEOUS
COMMUNITY
End: 2019-03-07 | Stop reason: SDUPTHER

## 2018-11-12 RX ORDER — ACETAMINOPHEN 325 MG/1
650 TABLET ORAL EVERY 6 HOURS PRN
Status: DISCONTINUED | OUTPATIENT
Start: 2018-11-12 | End: 2018-11-15 | Stop reason: HOSPADM

## 2018-11-12 RX ORDER — LISINOPRIL 20 MG/1
20 TABLET ORAL DAILY
Status: DISCONTINUED | OUTPATIENT
Start: 2018-11-13 | End: 2018-11-14

## 2018-11-12 RX ORDER — FLUOXETINE HYDROCHLORIDE 20 MG/1
40 CAPSULE ORAL DAILY
Status: DISCONTINUED | OUTPATIENT
Start: 2018-11-13 | End: 2018-11-15 | Stop reason: HOSPADM

## 2018-11-12 RX ORDER — AMITRIPTYLINE HYDROCHLORIDE 25 MG/1
25 TABLET, FILM COATED ORAL
COMMUNITY
End: 2019-09-27

## 2018-11-12 RX ORDER — NICOTINE 21 MG/24HR
14 PATCH, TRANSDERMAL 24 HOURS TRANSDERMAL
Status: DISCONTINUED | OUTPATIENT
Start: 2018-11-13 | End: 2018-11-15 | Stop reason: HOSPADM

## 2018-11-12 RX ORDER — DEXTROSE MONOHYDRATE 25 G/50ML
25 INJECTION, SOLUTION INTRAVENOUS
Status: DISCONTINUED | OUTPATIENT
Start: 2018-11-12 | End: 2018-11-15 | Stop reason: HOSPADM

## 2018-11-12 RX ADMIN — OXYCODONE HYDROCHLORIDE 5 MG: 5 TABLET ORAL at 19:40

## 2018-11-12 RX ADMIN — PREDNISOLONE ACETATE 1 DROP: 10 SUSPENSION/ DROPS OPHTHALMIC at 15:30

## 2018-11-12 RX ADMIN — ACYCLOVIR SODIUM 600 MG: 500 INJECTION, SOLUTION INTRAVENOUS at 14:53

## 2018-11-12 RX ADMIN — PREDNISONE 60 MG: 20 TABLET ORAL at 14:53

## 2018-11-12 RX ADMIN — METFORMIN HYDROCHLORIDE 1000 MG: 500 TABLET ORAL at 23:23

## 2018-11-12 RX ADMIN — PROPARACAINE HYDROCHLORIDE 1 DROP: 5 SOLUTION/ DROPS OPHTHALMIC at 14:30

## 2018-11-12 RX ADMIN — METOPROLOL TARTRATE 12.5 MG: 25 TABLET, FILM COATED ORAL at 23:28

## 2018-11-12 RX ADMIN — FENTANYL CITRATE 100 MCG: 50 INJECTION, SOLUTION INTRAMUSCULAR; INTRAVENOUS at 14:53

## 2018-11-12 RX ADMIN — FLUORESCEIN SODIUM 1 STRIP: 0.6 STRIP OPHTHALMIC at 14:21

## 2018-11-12 RX ADMIN — ERYTHROMYCIN: 5 OINTMENT OPHTHALMIC at 16:45

## 2018-11-12 RX ADMIN — OXYCODONE HYDROCHLORIDE 5 MG: 5 TABLET ORAL at 23:39

## 2018-11-12 RX ADMIN — ACYCLOVIR 800 MG: 800 TABLET ORAL at 23:25

## 2018-11-12 RX ADMIN — ARIPIPRAZOLE 2 MG: 2 TABLET ORAL at 23:24

## 2018-11-12 RX ADMIN — MORPHINE SULFATE 1 MG: 4 INJECTION INTRAVENOUS at 18:42

## 2018-11-12 ASSESSMENT — ENCOUNTER SYMPTOMS
DEPRESSION: 0
LOSS OF CONSCIOUSNESS: 1
EYE REDNESS: 1
DIZZINESS: 0
FEVER: 0
COUGH: 0
MYALGIAS: 0
BRUISES/BLEEDS EASILY: 0
HEMOPTYSIS: 0
HEARTBURN: 0
NAUSEA: 0
NECK PAIN: 0
BLURRED VISION: 1
PALPITATIONS: 0
CHILLS: 0
DOUBLE VISION: 0

## 2018-11-12 ASSESSMENT — PAIN SCALES - GENERAL: PAINLEVEL_OUTOF10: 9

## 2018-11-12 ASSESSMENT — LIFESTYLE VARIABLES: DO YOU DRINK ALCOHOL: NO

## 2018-11-12 NOTE — ED PROVIDER NOTES
ED Provider Note    CHIEF COMPLAINT  Chief Complaint   Patient presents with   • Eye Swelling   • Headache       HPI  Mary Moore is a 63 y.o. female who presents with a headache and eye pain.  The patient states this started about 5 days ago.  She was seen here on 8 November and diagnosed with sinusitis.  She states that the nasal steroids and amoxicillin has not been effective in her pain seems to be getting worse.  She states she now has a lot of swelling around her eye as well as severe right eye pain.  She continues have a headache that seems to be in the right temporal region.  She describes this as sharp and severe as well.  She is unaware of any exacerbating or relieving factors.  She has not had any vomiting.  Otherwise she does not have a cough.  She has multiple comorbidities.    REVIEW OF SYSTEMS  See HPI for further details. All other systems are negative.     PAST MEDICAL HISTORY  Past Medical History:   Diagnosis Date   • Anxiety 6/23/2016   • Chest pain 6/23/2016   • Constipation 6/23/2016   • Dyslipidemia 6/23/2016   • Epigastric pain 6/23/2016   • Facial rash 6/23/2016   • Fibromyalgia 6/23/2016   • GERD (gastroesophageal reflux disease) 6/23/2016   • HTN (hypertension) 6/23/2016   • Hypercalcemia 6/23/2016   • Hyperlipidemia    • Hypertension     bordeline    • Insomnia     chronic   • Lentigo 6/23/2016   • Lupus erythematosus 6/23/2016   • Multiple pulmonary nodules 6/23/2016   • Urinary incontinence 6/23/2016   • Vitamin D deficiency 6/23/2016       SOCIAL HISTORY  Social History     Social History   • Marital status:      Spouse name: N/A   • Number of children: N/A   • Years of education: N/A     Social History Main Topics   • Smoking status: Current Some Day Smoker     Packs/day: 0.25     Years: 40.00     Types: Cigarettes     Last attempt to quit: 4/1/2017   • Smokeless tobacco: Never Used      Comment: Infrequest ciggarette smoking.    • Alcohol use No   • Drug use: No   •  "Sexual activity: Not Currently     Partners: Male     Other Topics Concern   • Not on file     Social History Narrative   • No narrative on file           PHYSICAL EXAM  VITAL SIGNS: BP (!) 182/100   Pulse 87   Temp 36.5 °C (97.7 °F)   Resp 16   Ht 1.676 m (5' 6\")   Wt 70.2 kg (154 lb 12.2 oz)   SpO2 96%   BMI 24.98 kg/m²   Constitutional: in acute distress, Non-toxic appearance.   HENT: Vesicular lesions in the right temporal region, tympanic membranes are intact and nonerythematous bilaterally, Oropharynx moist without exudates or erythema, Nose normal.   Eyes: PERRLA, EOMI, conjunctival injection on the right, the right eye was visualized underneath the slit lamp and the patient does have punctate keratitis.  The patient does have mild periorbital edema on the right  Neck: Supple without meningismus  Lymphatic: No lymphadenopathy noted.   Cardiovascular: Normal heart rate, Normal rhythm, No murmurs, No rubs, No gallops.   Thorax & Lungs: Normal breath sounds, No respiratory distress, No wheezing, No chest tenderness.   Abdomen: Bowel sounds normal, Soft, No tenderness, no rebound, no guarding, no distention, No masses, No pulsatile masses.   Skin: The vesicular lesions noted on the scalp otherwise warm, Dry, No erythema, No rash.   Back: No tenderness, No CVA tenderness.   Extremities: Atraumatic with symmetric distal pulses, No edema, No tenderness, No cyanosis, No clubbing.   Neurologic: Alert & oriented x 3, cranial nerves II through XII are intact, Normal motor function, Normal sensory function, No focal deficits noted.   Psychiatric: Affect normal, Judgment normal, Mood normal.     COURSE & MEDICAL DECISION MAKING  Pertinent Labs & Imaging studies reviewed. (See chart for details)  This a 63-year-old female who presents the emergency department with signs and symptoms consistent with herpes zoster ophthalmicus.  The patient is very uncomfortable clinically and therefore she did receive intravenous " fentanyl for pain control.  This was mild to moderately effective.  I did consult with the ophthalmologist and he wants the patient to have Pred forte placed in the left eye 4 times a day and place the patient on oral steroids.  The patient lives alone and she is 63 years old she does not feel like she can control the pain at home.  After the intravenous fentanyl she continues have discomfort and will admit the patient for IV pain control.  She did receive IV acyclovir and she will require oral acyclovir as well.  The ophthalmologist wants to see the patient in his clinic on Wednesday if he can control the patient's pain while she is inpatient.  The patient's intraocular pressure on the right was 30 which is acceptable.    FINAL IMPRESSION  1.  Herpes zoster ophthalmicus       Disposition  The patient will be admitted in stable condition    Electronically signed by: Tobin Briones, 11/12/2018 2:26 PM

## 2018-11-12 NOTE — ED TRIAGE NOTES
"Chief Complaint   Patient presents with   • Eye Swelling   • Headache       Patient was seen here on Thursday for eye pain and swelling. Was given amoxicillin and Flonase. Patient present with right eye swelling and water blisters about right eye. Patient state she \"has a splitting headache\". VSS    Patient placed out in lobby and educated on triage process.   "

## 2018-11-12 NOTE — ED NOTES
Periorbital pain, redness, and swelling to right eye.  Patient seen on Thursday for the same.  She states given antibiotic eye drops.  She states the pain is getting worse and she is having a headache now.  She states the swelling is worse as well.

## 2018-11-13 LAB
ALBUMIN SERPL BCP-MCNC: 3.8 G/DL (ref 3.2–4.9)
ALBUMIN/GLOB SERPL: 1.4 G/DL
ALP SERPL-CCNC: 51 U/L (ref 30–99)
ALT SERPL-CCNC: 14 U/L (ref 2–50)
ANION GAP SERPL CALC-SCNC: 9 MMOL/L (ref 0–11.9)
AST SERPL-CCNC: 14 U/L (ref 12–45)
BASOPHILS # BLD AUTO: 0.3 % (ref 0–1.8)
BASOPHILS # BLD: 0.02 K/UL (ref 0–0.12)
BILIRUB SERPL-MCNC: 0.3 MG/DL (ref 0.1–1.5)
BUN SERPL-MCNC: 12 MG/DL (ref 8–22)
CALCIUM SERPL-MCNC: 9.1 MG/DL (ref 8.5–10.5)
CHLORIDE SERPL-SCNC: 105 MMOL/L (ref 96–112)
CO2 SERPL-SCNC: 22 MMOL/L (ref 20–33)
CREAT SERPL-MCNC: 0.64 MG/DL (ref 0.5–1.4)
EOSINOPHIL # BLD AUTO: 0.07 K/UL (ref 0–0.51)
EOSINOPHIL NFR BLD: 1.1 % (ref 0–6.9)
ERYTHROCYTE [DISTWIDTH] IN BLOOD BY AUTOMATED COUNT: 46.5 FL (ref 35.9–50)
EST. AVERAGE GLUCOSE BLD GHB EST-MCNC: 169 MG/DL
GLOBULIN SER CALC-MCNC: 2.8 G/DL (ref 1.9–3.5)
GLUCOSE BLD-MCNC: 103 MG/DL (ref 65–99)
GLUCOSE BLD-MCNC: 111 MG/DL (ref 65–99)
GLUCOSE BLD-MCNC: 175 MG/DL (ref 65–99)
GLUCOSE SERPL-MCNC: 102 MG/DL (ref 65–99)
HBA1C MFR BLD: 7.5 % (ref 0–5.6)
HCT VFR BLD AUTO: 38.2 % (ref 37–47)
HGB BLD-MCNC: 12.9 G/DL (ref 12–16)
IMM GRANULOCYTES # BLD AUTO: 0.02 K/UL (ref 0–0.11)
IMM GRANULOCYTES NFR BLD AUTO: 0.3 % (ref 0–0.9)
LYMPHOCYTES # BLD AUTO: 1.48 K/UL (ref 1–4.8)
LYMPHOCYTES NFR BLD: 22.4 % (ref 22–41)
MCH RBC QN AUTO: 30.9 PG (ref 27–33)
MCHC RBC AUTO-ENTMCNC: 33.8 G/DL (ref 33.6–35)
MCV RBC AUTO: 91.4 FL (ref 81.4–97.8)
MONOCYTES # BLD AUTO: 0.68 K/UL (ref 0–0.85)
MONOCYTES NFR BLD AUTO: 10.3 % (ref 0–13.4)
MYCOBACTERIUM SPEC CULT: NORMAL
NEUTROPHILS # BLD AUTO: 4.35 K/UL (ref 2–7.15)
NEUTROPHILS NFR BLD: 65.6 % (ref 44–72)
NRBC # BLD AUTO: 0 K/UL
NRBC BLD-RTO: 0 /100 WBC
PLATELET # BLD AUTO: 227 K/UL (ref 164–446)
PMV BLD AUTO: 8.9 FL (ref 9–12.9)
POTASSIUM SERPL-SCNC: 3.7 MMOL/L (ref 3.6–5.5)
PROT SERPL-MCNC: 6.6 G/DL (ref 6–8.2)
RBC # BLD AUTO: 4.18 M/UL (ref 4.2–5.4)
RHODAMINE-AURAMINE STN SPEC: NORMAL
SIGNIFICANT IND 70042: NORMAL
SITE SITE: NORMAL
SODIUM SERPL-SCNC: 136 MMOL/L (ref 135–145)
SOURCE SOURCE: NORMAL
WBC # BLD AUTO: 6.6 K/UL (ref 4.8–10.8)

## 2018-11-13 PROCEDURE — 82962 GLUCOSE BLOOD TEST: CPT | Mod: 91

## 2018-11-13 PROCEDURE — 99233 SBSQ HOSP IP/OBS HIGH 50: CPT | Mod: GC | Performed by: INTERNAL MEDICINE

## 2018-11-13 PROCEDURE — 96372 THER/PROPH/DIAG INJ SC/IM: CPT

## 2018-11-13 PROCEDURE — 700111 HCHG RX REV CODE 636 W/ 250 OVERRIDE (IP): Performed by: STUDENT IN AN ORGANIZED HEALTH CARE EDUCATION/TRAINING PROGRAM

## 2018-11-13 PROCEDURE — 80053 COMPREHEN METABOLIC PANEL: CPT

## 2018-11-13 PROCEDURE — 700111 HCHG RX REV CODE 636 W/ 250 OVERRIDE (IP): Performed by: INTERNAL MEDICINE

## 2018-11-13 PROCEDURE — 36415 COLL VENOUS BLD VENIPUNCTURE: CPT

## 2018-11-13 PROCEDURE — 96366 THER/PROPH/DIAG IV INF ADDON: CPT

## 2018-11-13 PROCEDURE — 700101 HCHG RX REV CODE 250: Performed by: STUDENT IN AN ORGANIZED HEALTH CARE EDUCATION/TRAINING PROGRAM

## 2018-11-13 PROCEDURE — 700111 HCHG RX REV CODE 636 W/ 250 OVERRIDE (IP): Performed by: EMERGENCY MEDICINE

## 2018-11-13 PROCEDURE — A9270 NON-COVERED ITEM OR SERVICE: HCPCS | Performed by: STUDENT IN AN ORGANIZED HEALTH CARE EDUCATION/TRAINING PROGRAM

## 2018-11-13 PROCEDURE — 700102 HCHG RX REV CODE 250 W/ 637 OVERRIDE(OP): Performed by: STUDENT IN AN ORGANIZED HEALTH CARE EDUCATION/TRAINING PROGRAM

## 2018-11-13 PROCEDURE — 85025 COMPLETE CBC W/AUTO DIFF WBC: CPT

## 2018-11-13 PROCEDURE — 700105 HCHG RX REV CODE 258: Performed by: INTERNAL MEDICINE

## 2018-11-13 PROCEDURE — 770001 HCHG ROOM/CARE - MED/SURG/GYN PRIV*

## 2018-11-13 RX ORDER — GABAPENTIN 400 MG/1
400 CAPSULE ORAL 3 TIMES DAILY
Status: DISCONTINUED | OUTPATIENT
Start: 2018-11-13 | End: 2018-11-15 | Stop reason: HOSPADM

## 2018-11-13 RX ORDER — OXYCODONE HYDROCHLORIDE 5 MG/1
5 TABLET ORAL EVERY 4 HOURS PRN
Status: DISCONTINUED | OUTPATIENT
Start: 2018-11-13 | End: 2018-11-15 | Stop reason: HOSPADM

## 2018-11-13 RX ORDER — GABAPENTIN 400 MG/1
400 CAPSULE ORAL 3 TIMES DAILY
Status: DISCONTINUED | OUTPATIENT
Start: 2018-11-13 | End: 2018-11-13

## 2018-11-13 RX ADMIN — STANDARDIZED SENNA CONCENTRATE AND DOCUSATE SODIUM 2 TABLET: 8.6; 5 TABLET, FILM COATED ORAL at 18:26

## 2018-11-13 RX ADMIN — PREDNISOLONE ACETATE 1 DROP: 10 SUSPENSION/ DROPS OPHTHALMIC at 10:06

## 2018-11-13 RX ADMIN — OXYCODONE HYDROCHLORIDE 5 MG: 5 TABLET ORAL at 22:37

## 2018-11-13 RX ADMIN — ACYCLOVIR 800 MG: 800 TABLET ORAL at 10:05

## 2018-11-13 RX ADMIN — ENOXAPARIN SODIUM 40 MG: 100 INJECTION SUBCUTANEOUS at 05:53

## 2018-11-13 RX ADMIN — OXYCODONE HYDROCHLORIDE 5 MG: 5 TABLET ORAL at 05:52

## 2018-11-13 RX ADMIN — ATORVASTATIN CALCIUM 40 MG: 40 TABLET, FILM COATED ORAL at 22:37

## 2018-11-13 RX ADMIN — ACYCLOVIR 800 MG: 800 TABLET ORAL at 05:51

## 2018-11-13 RX ADMIN — INSULIN LISPRO 2 UNITS: 100 INJECTION, SOLUTION INTRAVENOUS; SUBCUTANEOUS at 18:33

## 2018-11-13 RX ADMIN — INSULIN LISPRO 2 UNITS: 100 INJECTION, SOLUTION INTRAVENOUS; SUBCUTANEOUS at 12:41

## 2018-11-13 RX ADMIN — METOPROLOL TARTRATE 12.5 MG: 25 TABLET, FILM COATED ORAL at 05:49

## 2018-11-13 RX ADMIN — PREDNISOLONE ACETATE 1 DROP: 10 SUSPENSION/ DROPS OPHTHALMIC at 01:42

## 2018-11-13 RX ADMIN — ASPIRIN 81 MG: 81 TABLET, COATED ORAL at 05:51

## 2018-11-13 RX ADMIN — FLUOXETINE HYDROCHLORIDE 40 MG: 20 CAPSULE ORAL at 05:50

## 2018-11-13 RX ADMIN — METFORMIN HYDROCHLORIDE 1000 MG: 500 TABLET ORAL at 05:48

## 2018-11-13 RX ADMIN — STANDARDIZED SENNA CONCENTRATE AND DOCUSATE SODIUM 2 TABLET: 8.6; 5 TABLET, FILM COATED ORAL at 06:08

## 2018-11-13 RX ADMIN — GABAPENTIN 400 MG: 400 CAPSULE ORAL at 15:18

## 2018-11-13 RX ADMIN — OXYCODONE HYDROCHLORIDE 5 MG: 5 TABLET ORAL at 15:18

## 2018-11-13 RX ADMIN — ACYCLOVIR 800 MG: 800 TABLET ORAL at 15:18

## 2018-11-13 RX ADMIN — LISINOPRIL 20 MG: 20 TABLET ORAL at 05:52

## 2018-11-13 RX ADMIN — PREDNISOLONE ACETATE 1 DROP: 10 SUSPENSION/ DROPS OPHTHALMIC at 21:00

## 2018-11-13 RX ADMIN — OXYCODONE HYDROCHLORIDE 5 MG: 5 TABLET ORAL at 10:05

## 2018-11-13 RX ADMIN — METOPROLOL TARTRATE 12.5 MG: 25 TABLET, FILM COATED ORAL at 18:27

## 2018-11-13 RX ADMIN — NICOTINE 14 MG: 14 PATCH, EXTENDED RELEASE TRANSDERMAL at 05:52

## 2018-11-13 RX ADMIN — OXYCODONE HYDROCHLORIDE 5 MG: 5 TABLET ORAL at 01:43

## 2018-11-13 RX ADMIN — PREDNISOLONE ACETATE 1 DROP: 10 SUSPENSION/ DROPS OPHTHALMIC at 17:00

## 2018-11-13 RX ADMIN — ACYCLOVIR SODIUM 600 MG: 500 INJECTION, SOLUTION INTRAVENOUS at 15:18

## 2018-11-13 RX ADMIN — PREDNISOLONE ACETATE 1 DROP: 10 SUSPENSION/ DROPS OPHTHALMIC at 12:45

## 2018-11-13 RX ADMIN — GABAPENTIN 400 MG: 400 CAPSULE ORAL at 18:26

## 2018-11-13 RX ADMIN — GABAPENTIN 300 MG: 300 CAPSULE ORAL at 05:48

## 2018-11-13 RX ADMIN — ACYCLOVIR SODIUM 600 MG: 500 INJECTION, SOLUTION INTRAVENOUS at 22:36

## 2018-11-13 RX ADMIN — METFORMIN HYDROCHLORIDE 1000 MG: 500 TABLET ORAL at 18:26

## 2018-11-13 RX ADMIN — INSULIN GLARGINE 14 UNITS: 100 INJECTION, SOLUTION SUBCUTANEOUS at 18:33

## 2018-11-13 RX ADMIN — ARIPIPRAZOLE 2 MG: 2 TABLET ORAL at 22:37

## 2018-11-13 RX ADMIN — PREDNISONE 60 MG: 20 TABLET ORAL at 05:51

## 2018-11-13 ASSESSMENT — COGNITIVE AND FUNCTIONAL STATUS - GENERAL
SUGGESTED CMS G CODE MODIFIER MOBILITY: CH
DAILY ACTIVITIY SCORE: 24
SUGGESTED CMS G CODE MODIFIER DAILY ACTIVITY: CH
MOBILITY SCORE: 24

## 2018-11-13 ASSESSMENT — PATIENT HEALTH QUESTIONNAIRE - PHQ9
SUM OF ALL RESPONSES TO PHQ9 QUESTIONS 1 AND 2: 0
2. FEELING DOWN, DEPRESSED, IRRITABLE, OR HOPELESS: NOT AT ALL
SUM OF ALL RESPONSES TO PHQ9 QUESTIONS 1 AND 2: 0
1. LITTLE INTEREST OR PLEASURE IN DOING THINGS: NOT AT ALL
2. FEELING DOWN, DEPRESSED, IRRITABLE, OR HOPELESS: NOT AT ALL

## 2018-11-13 ASSESSMENT — ENCOUNTER SYMPTOMS
NECK PAIN: 0
PHOTOPHOBIA: 0
PALPITATIONS: 0
MYALGIAS: 0
BLURRED VISION: 1
DOUBLE VISION: 0
EYE DISCHARGE: 1
HEARTBURN: 0
EYE PAIN: 1
TINGLING: 0
HEADACHES: 0
DEPRESSION: 0
NAUSEA: 0
FEVER: 0
DIZZINESS: 1
HEMOPTYSIS: 0
CHILLS: 0
COUGH: 0
EYE REDNESS: 1
POLYDIPSIA: 0

## 2018-11-13 ASSESSMENT — PAIN SCALES - GENERAL
PAINLEVEL_OUTOF10: 6
PAINLEVEL_OUTOF10: 10
PAINLEVEL_OUTOF10: 10

## 2018-11-13 ASSESSMENT — LIFESTYLE VARIABLES: EVER_SMOKED: YES

## 2018-11-13 NOTE — CARE PLAN
Problem: Communication  Goal: The ability to communicate needs accurately and effectively will improve  Outcome: PROGRESSING AS EXPECTED  Educated pt on nightly routine. Encourage pt to voice concerns or questions. Call light within reach. All questions asked at this time.    Problem: Safety  Goal: Will remain free from falls  Outcome: PROGRESSING AS EXPECTED  Bed is locked and in lowest position. Call light is within reach. Pt educated on fall precaution.

## 2018-11-13 NOTE — ASSESSMENT & PLAN NOTE
Pain secondary to herpes zoster ophthalmicus with trigeminal V1 distribution    As needed Roxicodone   Gabapentin  Prednisolone taper  Pred forte eyedrops

## 2018-11-13 NOTE — TELEPHONE ENCOUNTER
I checked in her chart yesterday and it looks like she went to the ER which was appropriate and is getting treatment for Herpes Zoster Ophthalmicus. She is currently admitted to the hospital and should have a follow up once she is out of the hospital. Thanks.

## 2018-11-13 NOTE — SENIOR ADMIT NOTE
Mrs. Moore is a 63 years old female who presents to the emergency department because of severe headache with right eye pain, right eyelid swelling, right forehead rash that started about 5 days ago.  Patient was seen in the emergency department on 8 November and was diagnosed with sinusitis, patient received a prescription for Flonase and amoxicillin 500 mg but the patient reports that neither were effective.  Patient reports worsening of her right eyelid swelling and her headache mainly on the right temporal region which she described as a sharp 10/10 in severity getting worse with flashing light, nothing make it better (except for pain medications that she received it in the emergency depart) associated with blurred vision and discharge from the right eye.  Denies associated fever, chills, vomiting (but reports nausea probably because of the pain).    Past medical history significant for coronary artery disease (currently following up with cardiologist and treated medically, no previous MI or surgical intervention), insulin-dependent diabetes mellitus with no endorgan damage (managed by endocrinologist), hypertension, dyslipidemia, multiple small pulmonary nodules (stable, followed up with CT scan), nicotine addiction (recently started on Chantix to help her quit smoking).  Fibromyalgia, and anxiety and depression, insomnia, lupus erythematosus?  2016, vitamin D deficiency.    In the ED, patient was very uncomfortable clinically, she received IV fentanyl for pain control which give mild to moderate pain relief.  ED physician (Dr. Briones), consulted ophthalmologist over the phone and the ophthalmologist advised to initiate Pred Forte eyedrops 4 times daily with oral prednisone 60 mg daily, IV acyclovir and oral acyclovir.  The ophthalmologist wants to see the patient in his clinic on Wednesday if we can control the patient's pain by that time.  Patient was admitted to the hospital for treating her herpes zoster  ophthalmicus and controlling her pain.    Positive physical findings: Patient is in acute distress but non-toxic appearance, vesicular lesions in the right temporal region, conjunctival injection of the right eye, right eye was visualized under needs a slit lamp which showed punctate keratitis with moderate periorbital edema.  Consistent with Herpes zoster ophthalmicus with a V1 distribution    Impression  -Herpes zoster ophthalmicus with a V1 distribution and severe pain  -Poorly controlled hypertension    Plan  -Admit the patient to neurology floor with airborne and contact isolation because of herpes zoster ophthalmicus.  -Managing patient's pain by adding                -Gabapentin 300 mg 3 times daily                -Morphine 4 mg/ML injection 1 mg every 4 hours as needed IV                -Roxicodone tablet 5 mg every 4 hours  -Resume home medications, Lipitor 40 mg, and decrease lisinopril to 20 mg daily instead of 10 mg daily, metoprolol 12.5 mg twice daily, Abilify 2 mg daily, fluoxetine 40 mg daily, Metformin 1000 mg twice daily.  -Initiate insulin sliding scale with hypoglycemia protocol  -Initiated prednisone 60 mg daily plus acyclovir 800 mg 5 times daily, erythromycin eye ointment, Pred Forte eyedrop.  As recommended by ophthalmologist.  -Patient has an appointment with ophthalmologist on Wednesday, 11/14/2018  -Initiate diabetic diet  -We will continue monitoring her right eye corneal involvement.  -We will continue monitoring her blood pressure and blood sugar and try to keep them within normal range    For further details please see H&P by Dr. Conner.

## 2018-11-13 NOTE — PROGRESS NOTES
2 RN skin assessment with RN Yesenia.  Pt right eye swollen shut. Scelera pink, minimal amount of clear drainage. red rash on forehead. Heels dry and flaky.  Overall skin intact

## 2018-11-13 NOTE — PROGRESS NOTES
Internal Medicine Interval Note  Note Author: Neil Conner M.D.     Name Mary Moore     1955   Age/Sex 63 y.o. female   MRN [id number]   Code Status Full Code     After 5PM or if no immediate response to page, please call for cross-coverage  Attending/Team: Tanvi/Skyla See Patient List for primary contact information  Call [phone] to page    1st Call - Day Intern (R1):   Ubaldo 2nd Call - Day Sr. Resident (R2/R3):   Lucy         Reason for interval visit  (Principal Problem)   R. Eye swelling with Redness      Interval Problem Daily Status Update  (24 hours, problem oriented, brief subjective history, new lab/imaging data pertinent to that problem)     Patient was seen resting in bed.  She still reports severe pain along the right side of the face with complete closure of the right eye.  Increasing lesions on right forehead.  Patient was able to sleep overnight after receiving pain medications.  Denies fever, chills, shortness of breath, chest pain, lightheadedness.  Endorses double vision and blurry vision of the right eye.    -Spoke with Dr. Young, ophthalmology.  Recommend continuation of acyclovir oral or IV.  Send him pictures of right side of the face as per Dr. Young's request.  There was a concern for possible cellulitis.  I reported that it cannot be ruled out.  We will continue to follow ophthalmology recommendations. Will consider CT scan tomorrow  -Increased to gabapentin to 400 TID.  Patient reports pain is stable when examined later in the day.  -PRN Roxicodone     Review of Systems   Constitutional: Negative for chills and fever.   HENT: Negative for hearing loss and tinnitus.    Eyes: Positive for blurred vision (R. eye), pain (R. Eye), discharge (Clear) and redness (R. Eye). Negative for double vision and photophobia.   Respiratory: Negative for cough and hemoptysis.    Cardiovascular: Negative for chest pain and palpitations.   Gastrointestinal:  Negative for heartburn and nausea.   Genitourinary: Negative for dysuria and urgency.   Musculoskeletal: Negative for myalgias and neck pain.   Skin: Negative for itching and rash.   Neurological: Positive for dizziness. Negative for tingling and headaches.   Endo/Heme/Allergies: Negative for environmental allergies and polydipsia.   Psychiatric/Behavioral: Negative for depression and suicidal ideas.       Disposition/Barriers to discharge:   Herpes Zoster Opthalmicus     Consultants/Specialty  Opthalmology    PCP: Miki Newsome M.D.      Quality Measures  Quality-Core Measures   Reviewed items::  Medications reviewed, Radiology images reviewed, Labs reviewed and EKG reviewed  Norman catheter::  No Norman  DVT prophylaxis pharmacological::  Enoxaparin (Lovenox)          Physical Exam       Vitals:    11/12/18 2350 11/13/18 0125 11/13/18 0500 11/13/18 0900   BP: (!) 171/88 152/78 149/75 (!) 179/90   Pulse: 87 82 84 86   Resp: 18 18 16 18   Temp:  37.1 °C (98.7 °F) 36.4 °C (97.6 °F) 37.3 °C (99.1 °F)   SpO2:  93% 94% 96%   Weight:  68.4 kg (150 lb 12.7 oz)     Height:         Body mass index is 24.34 kg/m². Weight: 68.4 kg (150 lb 12.7 oz)  Oxygen Therapy:  Pulse Oximetry: 96 %, O2 (LPM): 0, O2 Delivery: None (Room Air)    Physical Exam  Constitutional: She is oriented to person, place, and time and well-developed, well-nourished, and in no distress.   HENT:   Head: Normocephalic.   Nose: Nose normal.   Mouth/Throat: Oropharynx is clear and moist. No oropharyngeal exudate.   R. Eyelid swollen to the point of closure  R. Face swollen with tenderness to touch  Multiple Vesicles present at R. Forehead. Consistent with V1 pattern  Mallampati score of 4   Eyes: Pupils are equal, round, and reactive to light. EOM are normal. No scleral icterus.   Injected R. Eye  Periorbital edema of R. Eye   Neck: Normal range of motion. Neck supple. No tracheal deviation present. No thyromegaly present.   Cardiovascular: Normal rate,  regular rhythm, normal heart sounds and intact distal pulses.  Exam reveals no friction rub.    No murmur heard.  Pulmonary/Chest: Effort normal and breath sounds normal. No respiratory distress. She has no wheezes.   Abdominal: Soft. Bowel sounds are normal. She exhibits no distension. There is no tenderness.   Musculoskeletal: Normal range of motion. She exhibits no edema or tenderness.   Neurological: She is alert and oriented to person, place, and time. She has normal reflexes. No cranial nerve deficit. Coordination normal.   Skin: Skin is warm and dry. No rash noted. She is not diaphoretic. No erythema.   Psychiatric: Mood, memory, affect and judgment normal.         Assessment/Plan     * Herpes zoster ophthalmicus of right eye   Assessment & Plan    Acute onset and rapid progression of right periorbital swelling, tenderness with blurry vision and diminished vision  Slit lamp test positive for Punctate Keratitis    Ophthalmology consulted  [place] forte eyedrops  Oral prednisone  Acyclovir  Gabapentin  As needed Roxicodone  Outpatient ophthalmology appointment on 11/14     Pain   Assessment & Plan    Pain secondary to herpes zoster ophthalmicus with trigeminal V1 distribution    As needed Roxicodone   Gabapentin  Prednisolone  Pred forte eyedrops     Lung nodule, multiple- (present on admission)   Assessment & Plan    Pulmonary nodules noted on CT scan in 3/2016 with 4 new nodules measuring up to 4.2 mm found on CT scan done on 7/2018  Current smoker with 10-20 pack years  Patient reports night sweats, however denies any weight changes  Could explain reactivation of herpes zoster possibly due to immunocompromise state       Type 2 diabetes mellitus with complication, with long-term current use of insulin (HCC)- (present on admission)   Assessment & Plan    Patient reports hx of type 2 DM  POC glucose of 119  Glycohemoglobin 7 months ago was 6.5    Lantus 14 units q. evening  Humalog sliding scale  hypoglycemia  protocol  Metformin 1000 mg twice daily     Poorly-controlled hypertension   Assessment & Plan    Blood pressure at the time of admission was 182/100    Started home medications  -Metoprolol 12.5 twice daily  -Lisinopril 20 mg     Dyslipidemia- (present on admission)   Assessment & Plan    History of dyslipidemia    Lipid panel pending  Started patient on home atorvastatin

## 2018-11-13 NOTE — PROGRESS NOTES
Report received from Noc RN and pt assessed. Pt in bed at this time, A+ox4, R eye 3+ dependent edema with clear lacrimal drainage noted. Eye pink/red. States it is 10/10 pain. Cool pack given for comfort measures. Breakfast at bedside. No further rash or skin breakdown noted over body. Denies ear discomfort at this time. Denies pain other than right eye. States last BM 11/12 AM PTA.

## 2018-11-13 NOTE — PROGRESS NOTES
Infection control called concerned about downgrade from airborne precautions. Recommendation to continue airborne/contact precautions at this time due to hx of lupus potentially causing infection to become systemic and affecting staff. States okay to change isolation orders per protocol. Dr. Ubaldo beard MD feels that risk of transmission low and lupus diagnosis may not be accurate but okay to continue to airborne for additional safety measures at this time.

## 2018-11-13 NOTE — ASSESSMENT & PLAN NOTE
Pulmonary nodules noted on CT scan in 3/2016 with 4 new nodules measuring up to 4.2 mm found on CT scan done on 7/2018  Current smoker with 10-20 pack years  Stable

## 2018-11-13 NOTE — TELEPHONE ENCOUNTER
Called patient and left a voicemail stating we received her voicemail and we are calling to check on how her ear is doing as well if she still needs an appt

## 2018-11-13 NOTE — TELEPHONE ENCOUNTER
Patient called and left another voicemail stating that she went to the emergency room last week but now her eyes cannot open.  Patient would like to know if we can see her or should she go back to the emergency room.

## 2018-11-13 NOTE — ED NOTES
"Medication arrived from pharmacy. Medicated per MAR. Juice provided per request. Warm blanket provided. Gauze taped to eye per request \"so I can sleep.\" VSS, rr even and unlabored on room air. No needs, will CTM.   "

## 2018-11-13 NOTE — ED NOTES
Pt upset, she states she is diabetic and needs her medications.  No orders on her MAR.  [employer] paged.  Pt given a microwave meal because she was hungry.  Pt crying in room because she feels this is not appropriate to give her.  Explained to patient this is all that is available until we can get her to her room and a tray can be ordered.

## 2018-11-13 NOTE — ED NOTES
Med rec updated and complete.  Allergies reviewed.  Pt is currently on an antibiotic.    Amoxicillin start date 11/08/18-11/15/18.    Pt stated she has been taking it as prescribed.

## 2018-11-13 NOTE — H&P
Internal Medicine Admitting History and Physical    Note Author: Neil Conner M.D.       Name Mary Moore     1955   Age/Sex 63 y.o. female   MRN [id number]   Code Status Full Code     After 5PM or if no immediate response to page, please call for cross-coverage  Attending/Team: Tanvi/Skyla See Patient List for primary contact information  Call [phone] to page    1st Call - Day Intern (R1):   Ubaldo 2nd Call - Day Sr. Resident (R2/R3):   José Manuel       Chief Complaint:   R. Eye/face swelling with pain    HPI:  Patient is a 63-year-old female presents into the ED for right eye pain with swelling, right-sided facial swelling/tenderness and lesions on right forehead.  Patient reported going to urgent care a week ago for right ear pain where right ear was flushed of wax.  When the pain did not subside patient went to [place] approximately 4 days ago.  She was discharged with unknown treatment.  She then came to [facility] on 11/10 for persistent ear pain.  Treated with Flonase and amoxicillin which did not subside the pain.  The last 2 days patient reports acute onset of swelling with tenderness and lesions of the right side of the face including the eyelid. 10/10 with relief after pain medication in ED.  She endorses right eye blurry vision with decreased vision in the last 2days.  PMH significant for fibromyalgia, hypertension, depression and lupus erythematosus?  Denies chest pain, shortness of breath, palpitations, dysuria, constipation, diarrhea, dizziness, lightheadedness.    In the ED: Patient was given fentanyl for pain. As per EDP, Slit lamp test positive for Punctate Keratitis. Ophthalmologist was consulted.  Pred forte eyedrops, oral prednisone, IV acyclovir and oral acyclovir.  Ophthalmologist will see patient on  in outpatient setting     Review of Systems   Constitutional: Negative for chills and fever.   HENT: Positive for ear pain. Negative for hearing loss and  tinnitus.    Eyes: Positive for blurred vision and redness. Negative for double vision.   Respiratory: Negative for cough and hemoptysis.    Cardiovascular: Negative for chest pain and palpitations.   Gastrointestinal: Negative for heartburn and nausea.   Genitourinary: Negative for dysuria and urgency.   Musculoskeletal: Negative for myalgias and neck pain.   Skin: Negative for itching and rash.   Neurological: Positive for loss of consciousness. Negative for dizziness.   Endo/Heme/Allergies: Negative for environmental allergies. Does not bruise/bleed easily.   Psychiatric/Behavioral: Negative for depression and suicidal ideas.             Past Medical History (Chronic medical problem, known complications and current treatment)    Past Medical History:   Diagnosis Date   • Multiple pulmonary nodules 6/23/2016   • Fibromyalgia 6/23/2016   • Constipation 6/23/2016   • Chest pain 6/23/2016   • Hypercalcemia 6/23/2016   • Urinary incontinence 6/23/2016   • Epigastric pain 6/23/2016   • GERD (gastroesophageal reflux disease) 6/23/2016   • Lentigo 6/23/2016   • Lupus erythematosus 6/23/2016   • Dyslipidemia 6/23/2016   • HTN (hypertension) 6/23/2016   • Anxiety 6/23/2016   • Vitamin D deficiency 6/23/2016   • Facial rash 6/23/2016   • Hyperlipidemia    • Hypertension     bordeline    • Insomnia     chronic       Past Surgical History:  Past Surgical History:   Procedure Laterality Date   • RECOVERY  8/25/2016    Procedure: CATH LAB-[facility] W/POSSIBLE-RITU;  Surgeon: Recoveryonly Surgery;  Location[place] SURG[place] PRE[place] [facility] [place]  Service:        Current Outpatient Medications:  Home Medications     Reviewed by Jyotsna Brenner (Pharmacy Tech) on 11/12/18 at 1641  Med List Status: Complete   Medication Last Dose Status   amitriptyline (ELAVIL) 25 MG Tab 11/11/2018 Active   amoxicillin (AMOXIL) 500 MG Cap 11/12/2018 Active   aripiprazole (ABILIFY) 2 MG tablet 11/11/2018 Active   aspirin EC (ECOTRIN) 81 MG Tablet  "Delayed Response 11/12/2018 Active   atorvastatin (LIPITOR) 40 MG Tab 11/12/2018 Active   fluoxetine (PROZAC) 40 MG capsule 11/12/2018 Active   fluticasone (FLONASE) 50 MCG/ACT nasal spray 11/12/2018 Active   Insulin Glargine (BASAGLAR KWIKPEN) 100 UNIT/ML Solution Pen-injector 11/11/2018 Active   lisinopril (PRINIVIL) 10 MG Tab 11/12/2018 Active   metformin (GLUCOPHAGE) 1000 MG tablet 11/12/2018 Active   metoprolol (LOPRESSOR) 25 MG Tab 11/12/2018 Active   PREVACID 24HR 15 MG CAPSULE DELAYED RELEASE 11/12/2018 Active   traZODone (DESYREL) 100 MG Tab 11/11/2018 Active                Medication Allergy/Sensitivities:  No Known Allergies      Family History (mandatory)   Family History   Problem Relation Age of Onset   • Heart Disease Father    • Stroke Father        Social History (mandatory)   Social History     Social History   • Marital status:      Spouse name: N/A   • Number of children: N/A   • Years of education: N/A     Occupational History   • Not on file.     Social History Main Topics   • Smoking status: Current Some Day Smoker     Packs/day: 0.25     Years: 40.00     Types: Cigarettes     Last attempt to quit: 4/1/2017   • Smokeless tobacco: Never Used      Comment: Infrequest ciggarette smoking.    • Alcohol use No   • Drug use: No   • Sexual activity: Not Currently     Partners: Male     Other Topics Concern   • Not on file     Social History Narrative   • No narrative on file     Living situation: Son  PCP : Miki Newsome M.D.    Physical Exam     Vitals:    11/12/18 1204 11/12/18 1659 11/12/18 1848 11/12/18 2058   BP:  140/79 (!) 172/102 150/82   Pulse:  86 90 84   Resp:    18   Temp:       SpO2:       Weight: 70.2 kg (154 lb 12.2 oz)      Height: 1.676 m (5' 6\")        Body mass index is 24.98 kg/m².  /82   Pulse 84   Temp 36.5 °C (97.7 °F)   Resp 18   Ht 1.676 m (5' 6\")   Wt 70.2 kg (154 lb 12.2 oz)   SpO2 96%   BMI 24.98 kg/m²   O2 therapy: Pulse Oximetry: 96 %, O2 Delivery: " None (Room Air)    Physical Exam   Constitutional: She is oriented to person, place, and time and well-developed, well-nourished, and in no distress.   HENT:   Head: Normocephalic.   Nose: Nose normal.   Mouth/Throat: Oropharynx is clear and moist. No oropharyngeal exudate.   R. Eyelid swollen to the point of closure  R. Face swollen with tenderness to touch  Vesicle present at R. Forehead    Mallampati score of 4   Eyes: Pupils are equal, round, and reactive to light. EOM are normal. No scleral icterus.   Injected R. Eye  Periorbital edema of R. Eye   Neck: Normal range of motion. Neck supple. No tracheal deviation present. No thyromegaly present.   Cardiovascular: Normal rate, regular rhythm, normal heart sounds and intact distal pulses.  Exam reveals no friction rub.    No murmur heard.  Pulmonary/Chest: Effort normal and breath sounds normal. No respiratory distress. She has no wheezes.   Abdominal: Soft. Bowel sounds are normal. She exhibits no distension. There is no tenderness.   Musculoskeletal: Normal range of motion. She exhibits no edema or tenderness.   Neurological: She is alert and oriented to person, place, and time. She has normal reflexes. No cranial nerve deficit. Coordination normal.   Skin: Skin is warm and dry. No rash noted. She is not diaphoretic. No erythema.   Psychiatric: Mood, memory, affect and judgment normal.         Data Review       Old Records Request:   Deferred  Current Records review/summary: Completed    Lab Data Review:  Recent Results (from the past 24 hour(s))   CBC WITH DIFFERENTIAL    Collection Time: 11/12/18  2:45 PM   Result Value Ref Range    WBC 5.1 4.8 - 10.8 K/uL    RBC 4.32 4.20 - 5.40 M/uL    Hemoglobin 13.5 12.0 - 16.0 g/dL    Hematocrit 39.5 37.0 - 47.0 %    MCV 91.4 81.4 - 97.8 fL    MCH 31.3 27.0 - 33.0 pg    MCHC 34.2 33.6 - 35.0 g/dL    RDW 46.6 35.9 - 50.0 fL    Platelet Count 228 164 - 446 K/uL    MPV 10.0 9.0 - 12.9 fL    Neutrophils-Polys 72.80 (H) 44.00 -  72.00 %    Lymphocytes 14.40 (L) 22.00 - 41.00 %    Monocytes 7.30 0.00 - 13.40 %    Eosinophils 5.10 0.00 - 6.90 %    Basophils 0.20 0.00 - 1.80 %    Immature Granulocytes 0.20 0.00 - 0.90 %    Nucleated RBC 0.00 /100 WBC    Neutrophils (Absolute) 3.69 2.00 - 7.15 K/uL    Lymphs (Absolute) 0.73 (L) 1.00 - 4.80 K/uL    Monos (Absolute) 0.37 0.00 - 0.85 K/uL    Eos (Absolute) 0.26 0.00 - 0.51 K/uL    Baso (Absolute) 0.01 0.00 - 0.12 K/uL    Immature Granulocytes (abs) 0.01 0.00 - 0.11 K/uL    NRBC (Absolute) 0.00 K/uL   COMP METABOLIC PANEL    Collection Time: 11/12/18  2:45 PM   Result Value Ref Range    Sodium 139 135 - 145 mmol/L    Potassium 4.0 3.6 - 5.5 mmol/L    Chloride 107 96 - 112 mmol/L    Co2 24 20 - 33 mmol/L    Anion Gap 8.0 0.0 - 11.9    Glucose 119 (H) 65 - 99 mg/dL    Bun 11 8 - 22 mg/dL    Creatinine 0.64 0.50 - 1.40 mg/dL    Calcium 9.1 8.5 - 10.5 mg/dL    AST(SGOT) 15 12 - 45 U/L    ALT(SGPT) 16 2 - 50 U/L    Alkaline Phosphatase 55 30 - 99 U/L    Total Bilirubin 0.3 0.1 - 1.5 mg/dL    Albumin 3.9 3.2 - 4.9 g/dL    Total Protein 6.8 6.0 - 8.2 g/dL    Globulin 2.9 1.9 - 3.5 g/dL    A-G Ratio 1.3 g/dL   ESTIMATED GFR    Collection Time: 11/12/18  2:45 PM   Result Value Ref Range    GFR If African American >60 >60 mL/min/1.73 m 2    GFR If Non African American >60 >60 mL/min/1.73 m 2       Imaging/Procedures Review:    Independant Imaging Review: Completed  No orders to display       EKG:   EKG Independant Review: Completed  QTc:421, HR: 52, Normal Sinus Rhythm, no ST/T changes     Records reviewed and summarized in current documentation :  Yes  [employer] teaching service handout given to patient:  No         Assessment/Plan     * Herpes zoster ophthalmicus of right eye   Assessment & Plan    Acute onset and rapid progression of right periorbital swelling, tenderness with blurry vision and diminished vision  Slit lamp test positive for Punctate Keratitis    Ophthalmology consulted  [place] forte  eyedrops  Oral prednisone  Acyclovir  Gabapentin  Roxicodone  As needed morphine  Outpatient ophthalmology appointment on 11/14     Pain   Assessment & Plan    Pain secondary to herpes zoster ophthalmicus with trigeminal V1 distribution    Roxicodone  As needed morphine  Gabapentin  Prednisolone  Pred forte eyedrops     Lung nodule, multiple- (present on admission)   Assessment & Plan    Pulmonary nodules noted on CT scan in 3/2016 with 4 new nodules measuring up to 4.2 mm found on CT scan done on 7/2018  Current smoker with 10-20 pack years  Patient reports night sweats, however denies any weight changes  Could explain reactivation of herpes zoster possibly due to immunocompromise state       Type 2 diabetes mellitus with complication, with long-term current use of insulin (HCC)- (present on admission)   Assessment & Plan    Patient reports hx of type 2 DM  POC glucose of 119  Glycohemoglobin 7 months ago was 6.5    Lantus 14 units q. evening  Humalog sliding scale  hypoglycemia protocol  Metformin 1000 mg twice daily     Poorly-controlled hypertension   Assessment & Plan    Blood pressure at the time of admission was 182/100    Started home medications  -Metoprolol 12.5 twice daily  -Lisinopril 20 mg     Dyslipidemia- (present on admission)   Assessment & Plan    History of dyslipidemia    Lipid panel pending  Started patient on home atorvastatin         Anticipated Hospital stay: Observation admit    Quality Measures  Quality-Core Measures   Reviewed items::  Labs reviewed, Medications reviewed, EKG reviewed and Radiology images reviewed  [place] catheter::  No Norman  DVT prophylaxis pharmacological::  Enoxaparin (Lovenox)    PCP: Miki Newsome M.D.

## 2018-11-13 NOTE — ASSESSMENT & PLAN NOTE
Patient reports hx of type 2 DM  POC glucose at admission of 119  Glycohemoglobin 7 months ago was 6.5    Lantus 14 units q. evening  Humalog sliding scale  hypoglycemia protocol  Metformin 1000 mg twice daily

## 2018-11-13 NOTE — PROGRESS NOTES
Right eye and forehead dressed with 4x4 gauze. Eye continues to be severely swollen and has limited opening capabilities. No open vesicles noted.

## 2018-11-13 NOTE — RESPIRATORY CARE
COPD EDUCATION by COPD CLINICAL EDUCATOR  11/13/2018 at 6:45 AM by Maryann Renee     Patient reviewed by COPD education team. Patient does not qualify for COPD program.

## 2018-11-13 NOTE — ASSESSMENT & PLAN NOTE
Blood pressure at the time of admission was 182/100    Started home medications  -Metoprolol 12.5 twice daily  -Lisinopril 40 mg

## 2018-11-13 NOTE — ED NOTES
Patient given food as requested. Patient remains calm at this time. RR even and unlabored on room air. No needs, will CTM.

## 2018-11-13 NOTE — ASSESSMENT & PLAN NOTE
Acute onset and rapid progression of right periorbital swelling, tenderness with blurry vision and diminished vision  Slit lamp test positive for Punctate Keratitis    Ophthalmology consulted  Pred forte eyedrops  Oral prednisone  Acyclovir  Gabapentin  As needed Roxicodone  Outpatient ophthalmology appointment on 11/16  Pt stable for discharge

## 2018-11-13 NOTE — PROGRESS NOTES
Assumed care of patient, bedside report received from FREDI Abraham. Pt brought up by transport and ambulated to bed.  Updated on POC, call light within reach and fall precautions in place. Bed locked and in lowest position. Patient instructed to call for assistance before getting out of bed. All questions answered, no other needs at this time.

## 2018-11-14 LAB
ALBUMIN SERPL BCP-MCNC: 3.4 G/DL (ref 3.2–4.9)
ALBUMIN/GLOB SERPL: 1.4 G/DL
ALP SERPL-CCNC: 45 U/L (ref 30–99)
ALT SERPL-CCNC: 13 U/L (ref 2–50)
ANION GAP SERPL CALC-SCNC: 7 MMOL/L (ref 0–11.9)
AST SERPL-CCNC: 14 U/L (ref 12–45)
BASOPHILS # BLD AUTO: 0 % (ref 0–1.8)
BASOPHILS # BLD: 0 K/UL (ref 0–0.12)
BILIRUB SERPL-MCNC: 0.3 MG/DL (ref 0.1–1.5)
BUN SERPL-MCNC: 15 MG/DL (ref 8–22)
CALCIUM SERPL-MCNC: 8.6 MG/DL (ref 8.5–10.5)
CHLORIDE SERPL-SCNC: 106 MMOL/L (ref 96–112)
CO2 SERPL-SCNC: 25 MMOL/L (ref 20–33)
CREAT SERPL-MCNC: 0.64 MG/DL (ref 0.5–1.4)
EOSINOPHIL # BLD AUTO: 0 K/UL (ref 0–0.51)
EOSINOPHIL NFR BLD: 0 % (ref 0–6.9)
ERYTHROCYTE [DISTWIDTH] IN BLOOD BY AUTOMATED COUNT: 45.8 FL (ref 35.9–50)
GLOBULIN SER CALC-MCNC: 2.5 G/DL (ref 1.9–3.5)
GLUCOSE BLD-MCNC: 150 MG/DL (ref 65–99)
GLUCOSE BLD-MCNC: 161 MG/DL (ref 65–99)
GLUCOSE BLD-MCNC: 163 MG/DL (ref 65–99)
GLUCOSE BLD-MCNC: 211 MG/DL (ref 65–99)
GLUCOSE BLD-MCNC: 253 MG/DL (ref 65–99)
GLUCOSE SERPL-MCNC: 91 MG/DL (ref 65–99)
HCT VFR BLD AUTO: 34.8 % (ref 37–47)
HGB BLD-MCNC: 11.6 G/DL (ref 12–16)
LG PLATELETS BLD QL SMEAR: NORMAL
LYMPHOCYTES # BLD AUTO: 1.37 K/UL (ref 1–4.8)
LYMPHOCYTES NFR BLD: 19.6 % (ref 22–41)
MANUAL DIFF BLD: NORMAL
MCH RBC QN AUTO: 30.6 PG (ref 27–33)
MCHC RBC AUTO-ENTMCNC: 33.3 G/DL (ref 33.6–35)
MCV RBC AUTO: 91.8 FL (ref 81.4–97.8)
MONOCYTES # BLD AUTO: 0.32 K/UL (ref 0–0.85)
MONOCYTES NFR BLD AUTO: 4.5 % (ref 0–13.4)
MORPHOLOGY BLD-IMP: NORMAL
NEUTROPHILS # BLD AUTO: 5.31 K/UL (ref 2–7.15)
NEUTROPHILS NFR BLD: 67.9 % (ref 44–72)
NEUTS BAND NFR BLD MANUAL: 8 % (ref 0–10)
NRBC # BLD AUTO: 0 K/UL
NRBC BLD-RTO: 0 /100 WBC
PLATELET # BLD AUTO: 215 K/UL (ref 164–446)
PLATELET BLD QL SMEAR: NORMAL
PMV BLD AUTO: 9.8 FL (ref 9–12.9)
POTASSIUM SERPL-SCNC: 3.8 MMOL/L (ref 3.6–5.5)
PROT SERPL-MCNC: 5.9 G/DL (ref 6–8.2)
RBC # BLD AUTO: 3.79 M/UL (ref 4.2–5.4)
RBC BLD AUTO: PRESENT
SODIUM SERPL-SCNC: 138 MMOL/L (ref 135–145)
VARIANT LYMPHS BLD QL SMEAR: NORMAL
WBC # BLD AUTO: 7 K/UL (ref 4.8–10.8)

## 2018-11-14 PROCEDURE — 82962 GLUCOSE BLOOD TEST: CPT | Mod: 91

## 2018-11-14 PROCEDURE — 85007 BL SMEAR W/DIFF WBC COUNT: CPT

## 2018-11-14 PROCEDURE — 700102 HCHG RX REV CODE 250 W/ 637 OVERRIDE(OP): Performed by: STUDENT IN AN ORGANIZED HEALTH CARE EDUCATION/TRAINING PROGRAM

## 2018-11-14 PROCEDURE — A9270 NON-COVERED ITEM OR SERVICE: HCPCS | Performed by: STUDENT IN AN ORGANIZED HEALTH CARE EDUCATION/TRAINING PROGRAM

## 2018-11-14 PROCEDURE — 700111 HCHG RX REV CODE 636 W/ 250 OVERRIDE (IP): Performed by: EMERGENCY MEDICINE

## 2018-11-14 PROCEDURE — 36415 COLL VENOUS BLD VENIPUNCTURE: CPT

## 2018-11-14 PROCEDURE — 80053 COMPREHEN METABOLIC PANEL: CPT

## 2018-11-14 PROCEDURE — 700105 HCHG RX REV CODE 258: Performed by: INTERNAL MEDICINE

## 2018-11-14 PROCEDURE — 770001 HCHG ROOM/CARE - MED/SURG/GYN PRIV*

## 2018-11-14 PROCEDURE — 700111 HCHG RX REV CODE 636 W/ 250 OVERRIDE (IP): Performed by: INTERNAL MEDICINE

## 2018-11-14 PROCEDURE — 700111 HCHG RX REV CODE 636 W/ 250 OVERRIDE (IP): Performed by: STUDENT IN AN ORGANIZED HEALTH CARE EDUCATION/TRAINING PROGRAM

## 2018-11-14 PROCEDURE — 85027 COMPLETE CBC AUTOMATED: CPT

## 2018-11-14 PROCEDURE — 99232 SBSQ HOSP IP/OBS MODERATE 35: CPT | Performed by: INTERNAL MEDICINE

## 2018-11-14 PROCEDURE — 700101 HCHG RX REV CODE 250: Performed by: STUDENT IN AN ORGANIZED HEALTH CARE EDUCATION/TRAINING PROGRAM

## 2018-11-14 RX ORDER — LISINOPRIL 20 MG/1
40 TABLET ORAL DAILY
Status: DISCONTINUED | OUTPATIENT
Start: 2018-11-15 | End: 2018-11-15 | Stop reason: HOSPADM

## 2018-11-14 RX ORDER — LISINOPRIL 20 MG/1
20 TABLET ORAL ONCE
Status: COMPLETED | OUTPATIENT
Start: 2018-11-14 | End: 2018-11-14

## 2018-11-14 RX ORDER — POTASSIUM CHLORIDE 20 MEQ/1
20 TABLET, EXTENDED RELEASE ORAL ONCE
Status: COMPLETED | OUTPATIENT
Start: 2018-11-14 | End: 2018-11-14

## 2018-11-14 RX ADMIN — PREDNISOLONE ACETATE 1 DROP: 10 SUSPENSION/ DROPS OPHTHALMIC at 17:00

## 2018-11-14 RX ADMIN — METOPROLOL TARTRATE 12.5 MG: 25 TABLET, FILM COATED ORAL at 17:03

## 2018-11-14 RX ADMIN — ATORVASTATIN CALCIUM 40 MG: 40 TABLET, FILM COATED ORAL at 21:00

## 2018-11-14 RX ADMIN — METFORMIN HYDROCHLORIDE 1000 MG: 500 TABLET ORAL at 05:08

## 2018-11-14 RX ADMIN — INSULIN LISPRO 5 UNITS: 100 INJECTION, SOLUTION INTRAVENOUS; SUBCUTANEOUS at 17:13

## 2018-11-14 RX ADMIN — OXYCODONE HYDROCHLORIDE 5 MG: 5 TABLET ORAL at 17:14

## 2018-11-14 RX ADMIN — LABETALOL HYDROCHLORIDE 10 MG: 5 INJECTION, SOLUTION INTRAVENOUS at 13:04

## 2018-11-14 RX ADMIN — FLUOXETINE HYDROCHLORIDE 40 MG: 20 CAPSULE ORAL at 05:07

## 2018-11-14 RX ADMIN — PREDNISONE 60 MG: 20 TABLET ORAL at 05:07

## 2018-11-14 RX ADMIN — GABAPENTIN 400 MG: 400 CAPSULE ORAL at 11:44

## 2018-11-14 RX ADMIN — OXYCODONE HYDROCHLORIDE 5 MG: 5 TABLET ORAL at 21:00

## 2018-11-14 RX ADMIN — POTASSIUM CHLORIDE 20 MEQ: 1500 TABLET, EXTENDED RELEASE ORAL at 07:41

## 2018-11-14 RX ADMIN — STANDARDIZED SENNA CONCENTRATE AND DOCUSATE SODIUM 2 TABLET: 8.6; 5 TABLET, FILM COATED ORAL at 05:07

## 2018-11-14 RX ADMIN — INSULIN LISPRO 5 UNITS: 100 INJECTION, SOLUTION INTRAVENOUS; SUBCUTANEOUS at 11:54

## 2018-11-14 RX ADMIN — INSULIN LISPRO 5 UNITS: 100 INJECTION, SOLUTION INTRAVENOUS; SUBCUTANEOUS at 07:46

## 2018-11-14 RX ADMIN — ACYCLOVIR SODIUM 600 MG: 500 INJECTION, SOLUTION INTRAVENOUS at 21:00

## 2018-11-14 RX ADMIN — INSULIN GLARGINE 14 UNITS: 100 INJECTION, SOLUTION SUBCUTANEOUS at 17:06

## 2018-11-14 RX ADMIN — METFORMIN HYDROCHLORIDE 1000 MG: 500 TABLET ORAL at 17:03

## 2018-11-14 RX ADMIN — ACYCLOVIR SODIUM 600 MG: 500 INJECTION, SOLUTION INTRAVENOUS at 14:20

## 2018-11-14 RX ADMIN — PREDNISOLONE ACETATE 1 DROP: 10 SUSPENSION/ DROPS OPHTHALMIC at 13:00

## 2018-11-14 RX ADMIN — ACETAMINOPHEN 650 MG: 325 TABLET, FILM COATED ORAL at 05:08

## 2018-11-14 RX ADMIN — LISINOPRIL 20 MG: 20 TABLET ORAL at 14:59

## 2018-11-14 RX ADMIN — INSULIN LISPRO 3 UNITS: 100 INJECTION, SOLUTION INTRAVENOUS; SUBCUTANEOUS at 11:50

## 2018-11-14 RX ADMIN — ASPIRIN 81 MG: 81 TABLET, COATED ORAL at 05:07

## 2018-11-14 RX ADMIN — ARIPIPRAZOLE 2 MG: 2 TABLET ORAL at 20:59

## 2018-11-14 RX ADMIN — METOPROLOL TARTRATE 12.5 MG: 25 TABLET, FILM COATED ORAL at 05:08

## 2018-11-14 RX ADMIN — PREDNISOLONE ACETATE 1 DROP: 10 SUSPENSION/ DROPS OPHTHALMIC at 09:30

## 2018-11-14 RX ADMIN — PREDNISOLONE ACETATE 1 DROP: 10 SUSPENSION/ DROPS OPHTHALMIC at 21:00

## 2018-11-14 RX ADMIN — ENOXAPARIN SODIUM 40 MG: 100 INJECTION SUBCUTANEOUS at 05:08

## 2018-11-14 RX ADMIN — INSULIN LISPRO 5 UNITS: 100 INJECTION, SOLUTION INTRAVENOUS; SUBCUTANEOUS at 17:07

## 2018-11-14 RX ADMIN — ACYCLOVIR SODIUM 600 MG: 500 INJECTION, SOLUTION INTRAVENOUS at 05:05

## 2018-11-14 RX ADMIN — INSULIN LISPRO 2 UNITS: 100 INJECTION, SOLUTION INTRAVENOUS; SUBCUTANEOUS at 07:48

## 2018-11-14 RX ADMIN — STANDARDIZED SENNA CONCENTRATE AND DOCUSATE SODIUM 2 TABLET: 8.6; 5 TABLET, FILM COATED ORAL at 17:03

## 2018-11-14 RX ADMIN — LISINOPRIL 20 MG: 20 TABLET ORAL at 05:07

## 2018-11-14 RX ADMIN — NICOTINE 14 MG: 14 PATCH, EXTENDED RELEASE TRANSDERMAL at 05:06

## 2018-11-14 RX ADMIN — GABAPENTIN 400 MG: 400 CAPSULE ORAL at 17:03

## 2018-11-14 RX ADMIN — GABAPENTIN 400 MG: 400 CAPSULE ORAL at 05:07

## 2018-11-14 ASSESSMENT — ENCOUNTER SYMPTOMS
DEPRESSION: 0
EYE PAIN: 1
PHOTOPHOBIA: 0
NECK PAIN: 0
MYALGIAS: 0
BLURRED VISION: 1
EYE DISCHARGE: 1
EYE REDNESS: 1
FEVER: 0
POLYDIPSIA: 0
TINGLING: 0
DIZZINESS: 1
COUGH: 0
DOUBLE VISION: 0
CHILLS: 0
HEADACHES: 0
HEMOPTYSIS: 0
NAUSEA: 0
HEARTBURN: 0
PALPITATIONS: 0

## 2018-11-14 ASSESSMENT — PATIENT HEALTH QUESTIONNAIRE - PHQ9
2. FEELING DOWN, DEPRESSED, IRRITABLE, OR HOPELESS: NOT AT ALL
SUM OF ALL RESPONSES TO PHQ9 QUESTIONS 1 AND 2: 0
1. LITTLE INTEREST OR PLEASURE IN DOING THINGS: NOT AT ALL

## 2018-11-14 ASSESSMENT — PAIN SCALES - GENERAL
PAINLEVEL_OUTOF10: 1
PAINLEVEL_OUTOF10: 4
PAINLEVEL_OUTOF10: 1
PAINLEVEL_OUTOF10: 6

## 2018-11-14 NOTE — CONSULTS
S: 62 yo woman admitted from the ED for severe right facial pain and rash, presumably from herpes zoster. She reports these symptoms began 11/8/18, originally diagnosed with sinusitis and treated with Flonase and Amoxicillin without improvement. She reports intense pain surrounding the right eye and forehead extending into the scalp on the right side only, respecting the vertical midline. She describes the pain as throbbing and burning. She has been receiving IV acyclovir, oral prednisone, and topical prednisolone and erythromycin ointment with some improvement of symptoms. She has been given fentanyl and gabapentin for pain management.    O: VAcc 20/70 OD, 20/30 OS.  External exam shows 2-3+ periocular edema with vesicular eruption primarily involving the eyelids and forehead on the right side only. There is 1+ erythema and minimal tenderness. Pupil 2 mm, poorly reactive OU. Motility appears full OU. Anterior segment exam normal to penlight.    Assessment: Right trigeminal herpes zoster    Plan: Continue present treatment. Discharge when stable. Continue anti-viral therapy for 14 days (Acyclovir 800 mg 5x daily), taper prednisone. Pain management as necessary. I will see her in my clinic later this week or early next week, depending on discharge date. Consider ENT consult regarding hearing loss, tinnitus.

## 2018-11-14 NOTE — PROGRESS NOTES
Bedside report received from day shift RN. Assumed care. Pt is A&O x 4, Pt is resting in bed. Pt states her pain has decreased within the last hour. Pt was updated on plan of care. Pt has call light, personal belongings, and bedside table within reach. Assessment completed. Will continue to monitor.

## 2018-11-14 NOTE — CARE PLAN
Problem: Communication  Goal: The ability to communicate needs accurately and effectively will improve  Outcome: PROGRESSING AS EXPECTED  Pt updated on plan of care, white board updated    Problem: Safety  Goal: Will remain free from injury  Outcome: PROGRESSING AS EXPECTED  Pt instructed to use call light, fall education provided

## 2018-11-14 NOTE — PROGRESS NOTES
MD notified during downtime regarding Hypertension 201/109, Recheck 171/83, orders to continue to monitor, MD stated [facility] purple team would increase patients lisinopril to 40mg

## 2018-11-15 VITALS
TEMPERATURE: 98.4 F | SYSTOLIC BLOOD PRESSURE: 156 MMHG | OXYGEN SATURATION: 92 % | DIASTOLIC BLOOD PRESSURE: 93 MMHG | BODY MASS INDEX: 25.72 KG/M2 | HEIGHT: 66 IN | RESPIRATION RATE: 17 BRPM | WEIGHT: 160.05 LBS | HEART RATE: 67 BPM

## 2018-11-15 LAB
ALBUMIN SERPL BCP-MCNC: 3.2 G/DL (ref 3.2–4.9)
ALBUMIN/GLOB SERPL: 1.3 G/DL
ALP SERPL-CCNC: 46 U/L (ref 30–99)
ALT SERPL-CCNC: 14 U/L (ref 2–50)
ANION GAP SERPL CALC-SCNC: 8 MMOL/L (ref 0–11.9)
AST SERPL-CCNC: 12 U/L (ref 12–45)
BASOPHILS # BLD AUTO: 0 % (ref 0–1.8)
BASOPHILS # BLD: 0 K/UL (ref 0–0.12)
BILIRUB SERPL-MCNC: 0.2 MG/DL (ref 0.1–1.5)
BUN SERPL-MCNC: 14 MG/DL (ref 8–22)
CALCIUM SERPL-MCNC: 8.5 MG/DL (ref 8.5–10.5)
CHLORIDE SERPL-SCNC: 106 MMOL/L (ref 96–112)
CO2 SERPL-SCNC: 26 MMOL/L (ref 20–33)
CREAT SERPL-MCNC: 0.69 MG/DL (ref 0.5–1.4)
EOSINOPHIL # BLD AUTO: 0.07 K/UL (ref 0–0.51)
EOSINOPHIL NFR BLD: 0.9 % (ref 0–6.9)
ERYTHROCYTE [DISTWIDTH] IN BLOOD BY AUTOMATED COUNT: 46.6 FL (ref 35.9–50)
GLOBULIN SER CALC-MCNC: 2.5 G/DL (ref 1.9–3.5)
GLUCOSE BLD-MCNC: 161 MG/DL (ref 65–99)
GLUCOSE BLD-MCNC: 211 MG/DL (ref 65–99)
GLUCOSE BLD-MCNC: 233 MG/DL (ref 65–99)
GLUCOSE SERPL-MCNC: 140 MG/DL (ref 65–99)
HCT VFR BLD AUTO: 35.2 % (ref 37–47)
HGB BLD-MCNC: 11.5 G/DL (ref 12–16)
LG PLATELETS BLD QL SMEAR: NORMAL
LYMPHOCYTES # BLD AUTO: 2.48 K/UL (ref 1–4.8)
LYMPHOCYTES NFR BLD: 33 % (ref 22–41)
MANUAL DIFF BLD: ABNORMAL
MCH RBC QN AUTO: 30.3 PG (ref 27–33)
MCHC RBC AUTO-ENTMCNC: 32.7 G/DL (ref 33.6–35)
MCV RBC AUTO: 92.6 FL (ref 81.4–97.8)
MONOCYTES # BLD AUTO: 0.28 K/UL (ref 0–0.85)
MONOCYTES NFR BLD AUTO: 3.7 % (ref 0–13.4)
MORPHOLOGY BLD-IMP: NORMAL
NEUTROPHILS # BLD AUTO: 4.68 K/UL (ref 2–7.15)
NEUTROPHILS NFR BLD: 52.3 % (ref 44–72)
NEUTS BAND NFR BLD MANUAL: 10.1 % (ref 0–10)
NRBC # BLD AUTO: 0 K/UL
NRBC BLD-RTO: 0 /100 WBC
PLATELET # BLD AUTO: 222 K/UL (ref 164–446)
PLATELET BLD QL SMEAR: NORMAL
PMV BLD AUTO: 9.7 FL (ref 9–12.9)
POTASSIUM SERPL-SCNC: 3.7 MMOL/L (ref 3.6–5.5)
PROT SERPL-MCNC: 5.7 G/DL (ref 6–8.2)
RBC # BLD AUTO: 3.8 M/UL (ref 4.2–5.4)
RBC BLD AUTO: PRESENT
SMUDGE CELLS BLD QL SMEAR: NORMAL
SODIUM SERPL-SCNC: 140 MMOL/L (ref 135–145)
VARIANT LYMPHS BLD QL SMEAR: NORMAL
WBC # BLD AUTO: 7.5 K/UL (ref 4.8–10.8)

## 2018-11-15 PROCEDURE — 700105 HCHG RX REV CODE 258: Performed by: INTERNAL MEDICINE

## 2018-11-15 PROCEDURE — 85007 BL SMEAR W/DIFF WBC COUNT: CPT

## 2018-11-15 PROCEDURE — A9270 NON-COVERED ITEM OR SERVICE: HCPCS | Performed by: STUDENT IN AN ORGANIZED HEALTH CARE EDUCATION/TRAINING PROGRAM

## 2018-11-15 PROCEDURE — 82962 GLUCOSE BLOOD TEST: CPT | Mod: 91

## 2018-11-15 PROCEDURE — 700111 HCHG RX REV CODE 636 W/ 250 OVERRIDE (IP): Performed by: EMERGENCY MEDICINE

## 2018-11-15 PROCEDURE — 85027 COMPLETE CBC AUTOMATED: CPT

## 2018-11-15 PROCEDURE — 700102 HCHG RX REV CODE 250 W/ 637 OVERRIDE(OP): Performed by: STUDENT IN AN ORGANIZED HEALTH CARE EDUCATION/TRAINING PROGRAM

## 2018-11-15 PROCEDURE — 36415 COLL VENOUS BLD VENIPUNCTURE: CPT

## 2018-11-15 PROCEDURE — 700111 HCHG RX REV CODE 636 W/ 250 OVERRIDE (IP): Performed by: INTERNAL MEDICINE

## 2018-11-15 PROCEDURE — 700111 HCHG RX REV CODE 636 W/ 250 OVERRIDE (IP): Performed by: STUDENT IN AN ORGANIZED HEALTH CARE EDUCATION/TRAINING PROGRAM

## 2018-11-15 PROCEDURE — 99239 HOSP IP/OBS DSCHRG MGMT >30: CPT | Performed by: INTERNAL MEDICINE

## 2018-11-15 PROCEDURE — 80053 COMPREHEN METABOLIC PANEL: CPT

## 2018-11-15 RX ORDER — GABAPENTIN 400 MG/1
400 CAPSULE ORAL 3 TIMES DAILY
Qty: 90 CAP | Refills: 1 | Status: SHIPPED | OUTPATIENT
Start: 2018-11-15 | End: 2019-01-05 | Stop reason: SDUPTHER

## 2018-11-15 RX ORDER — LISINOPRIL 30 MG/1
30 TABLET ORAL DAILY
Qty: 30 TAB | Refills: 3 | Status: SHIPPED | OUTPATIENT
Start: 2018-11-15 | End: 2019-01-08

## 2018-11-15 RX ORDER — NICOTINE 21 MG/24HR
1 PATCH, TRANSDERMAL 24 HOURS TRANSDERMAL EVERY 24 HOURS
Qty: 30 PATCH | Refills: 0 | Status: SHIPPED | OUTPATIENT
Start: 2018-11-15 | End: 2019-02-12

## 2018-11-15 RX ORDER — OXYCODONE HYDROCHLORIDE 5 MG/1
5 TABLET ORAL
Qty: 14 TAB | Refills: 0 | Status: SHIPPED | OUTPATIENT
Start: 2018-11-15 | End: 2018-11-22

## 2018-11-15 RX ORDER — ACETAMINOPHEN 325 MG/1
650 TABLET ORAL EVERY 6 HOURS PRN
Qty: 30 TAB | Refills: 0 | Status: SHIPPED | OUTPATIENT
Start: 2018-11-15 | End: 2019-01-08

## 2018-11-15 RX ORDER — ACYCLOVIR 800 MG/1
800 TABLET ORAL
Qty: 55 TAB | Refills: 0 | Status: SHIPPED | OUTPATIENT
Start: 2018-11-15 | End: 2019-01-08

## 2018-11-15 RX ORDER — PREDNISOLONE ACETATE 10 MG/ML
1 SUSPENSION/ DROPS OPHTHALMIC 4 TIMES DAILY
Qty: 1 BOTTLE | Refills: 0 | Status: SHIPPED | OUTPATIENT
Start: 2018-11-15 | End: 2019-02-12

## 2018-11-15 RX ORDER — PREDNISONE 20 MG/1
TABLET ORAL
Qty: 30 TAB | Refills: 0 | Status: SHIPPED | OUTPATIENT
Start: 2018-11-16 | End: 2018-11-15

## 2018-11-15 RX ORDER — SODIUM CHLORIDE 9 MG/ML
INJECTION, SOLUTION INTRAVENOUS
Status: COMPLETED
Start: 2018-11-15 | End: 2018-11-15

## 2018-11-15 RX ORDER — PREDNISONE 20 MG/1
TABLET ORAL
Qty: 40 TAB | Refills: 0 | Status: SHIPPED | OUTPATIENT
Start: 2018-11-16 | End: 2019-01-08

## 2018-11-15 RX ADMIN — OXYCODONE HYDROCHLORIDE 5 MG: 5 TABLET ORAL at 08:57

## 2018-11-15 RX ADMIN — GABAPENTIN 400 MG: 400 CAPSULE ORAL at 11:53

## 2018-11-15 RX ADMIN — METFORMIN HYDROCHLORIDE 1000 MG: 500 TABLET ORAL at 16:23

## 2018-11-15 RX ADMIN — INSULIN LISPRO 5 UNITS: 100 INJECTION, SOLUTION INTRAVENOUS; SUBCUTANEOUS at 16:21

## 2018-11-15 RX ADMIN — INSULIN LISPRO 2 UNITS: 100 INJECTION, SOLUTION INTRAVENOUS; SUBCUTANEOUS at 05:42

## 2018-11-15 RX ADMIN — PREDNISONE 60 MG: 20 TABLET ORAL at 05:23

## 2018-11-15 RX ADMIN — ACYCLOVIR SODIUM 600 MG: 500 INJECTION, SOLUTION INTRAVENOUS at 12:57

## 2018-11-15 RX ADMIN — INSULIN GLARGINE 14 UNITS: 100 INJECTION, SOLUTION SUBCUTANEOUS at 16:24

## 2018-11-15 RX ADMIN — FLUOXETINE HYDROCHLORIDE 40 MG: 20 CAPSULE ORAL at 05:23

## 2018-11-15 RX ADMIN — STANDARDIZED SENNA CONCENTRATE AND DOCUSATE SODIUM 2 TABLET: 8.6; 5 TABLET, FILM COATED ORAL at 16:22

## 2018-11-15 RX ADMIN — ASPIRIN 81 MG: 81 TABLET, COATED ORAL at 05:23

## 2018-11-15 RX ADMIN — INSULIN LISPRO 3 UNITS: 100 INJECTION, SOLUTION INTRAVENOUS; SUBCUTANEOUS at 11:54

## 2018-11-15 RX ADMIN — GABAPENTIN 400 MG: 400 CAPSULE ORAL at 16:23

## 2018-11-15 RX ADMIN — PREDNISOLONE ACETATE 1 DROP: 10 SUSPENSION/ DROPS OPHTHALMIC at 17:00

## 2018-11-15 RX ADMIN — METFORMIN HYDROCHLORIDE 1000 MG: 500 TABLET ORAL at 05:23

## 2018-11-15 RX ADMIN — INSULIN LISPRO 5 UNITS: 100 INJECTION, SOLUTION INTRAVENOUS; SUBCUTANEOUS at 07:03

## 2018-11-15 RX ADMIN — METOPROLOL TARTRATE 12.5 MG: 25 TABLET, FILM COATED ORAL at 16:23

## 2018-11-15 RX ADMIN — NICOTINE 14 MG: 14 PATCH, EXTENDED RELEASE TRANSDERMAL at 05:22

## 2018-11-15 RX ADMIN — GABAPENTIN 400 MG: 400 CAPSULE ORAL at 05:23

## 2018-11-15 RX ADMIN — PREDNISOLONE ACETATE 1 DROP: 10 SUSPENSION/ DROPS OPHTHALMIC at 09:00

## 2018-11-15 RX ADMIN — ENOXAPARIN SODIUM 40 MG: 100 INJECTION SUBCUTANEOUS at 05:22

## 2018-11-15 RX ADMIN — OXYCODONE HYDROCHLORIDE 5 MG: 5 TABLET ORAL at 12:57

## 2018-11-15 RX ADMIN — METOPROLOL TARTRATE 12.5 MG: 25 TABLET, FILM COATED ORAL at 05:24

## 2018-11-15 RX ADMIN — INSULIN LISPRO 3 UNITS: 100 INJECTION, SOLUTION INTRAVENOUS; SUBCUTANEOUS at 16:19

## 2018-11-15 RX ADMIN — PREDNISOLONE ACETATE 1 DROP: 10 SUSPENSION/ DROPS OPHTHALMIC at 11:53

## 2018-11-15 RX ADMIN — LISINOPRIL 40 MG: 20 TABLET ORAL at 05:23

## 2018-11-15 RX ADMIN — STANDARDIZED SENNA CONCENTRATE AND DOCUSATE SODIUM 2 TABLET: 8.6; 5 TABLET, FILM COATED ORAL at 05:24

## 2018-11-15 RX ADMIN — INSULIN LISPRO 5 UNITS: 100 INJECTION, SOLUTION INTRAVENOUS; SUBCUTANEOUS at 11:56

## 2018-11-15 RX ADMIN — ACYCLOVIR SODIUM 600 MG: 500 INJECTION, SOLUTION INTRAVENOUS at 05:22

## 2018-11-15 ASSESSMENT — ENCOUNTER SYMPTOMS
HEMOPTYSIS: 0
EYE REDNESS: 1
EYE PAIN: 1
CHILLS: 0
PHOTOPHOBIA: 0
DEPRESSION: 0
MYALGIAS: 0
POLYDIPSIA: 0
FEVER: 0
PALPITATIONS: 0
TINGLING: 0
DOUBLE VISION: 0
EYE DISCHARGE: 1
HEARTBURN: 0
BLURRED VISION: 1
HEADACHES: 0
NECK PAIN: 0
COUGH: 0
NAUSEA: 0
DIZZINESS: 0

## 2018-11-15 ASSESSMENT — PAIN SCALES - GENERAL
PAINLEVEL_OUTOF10: 4
PAINLEVEL_OUTOF10: 8
PAINLEVEL_OUTOF10: 8

## 2018-11-15 NOTE — PROGRESS NOTES
Internal Medicine Interval Note  Note Author: Tammy Alcazar M.D.     Name Mary Moore     1955   Age/Sex 63 y.o. female   MRN [id number]   Code Status Full Code     After 5PM or if no immediate response to page, please call for cross-coverage  Attending/Team: /Skyla See Patient List for primary contact information  Call [phone] to page    1st Call - Day Intern (R1):   Dr. Conner 2nd Call - Day Sr. Resident (R2/R3):   Dr. Ayala         Reason for interval visit  (Principal Problem)   Herpes zoster ophthalmicus with V1 distribution      Interval Problem Daily Status Update  (24 hours, problem oriented, brief subjective history, new lab/imaging data pertinent to that problem)     - No acute events overnight, pt reports better control of her right side face pain after increasing gabapentin to 400 TID with PRN Roxicodone/resting in bed/able to sleep through the night with no problem/ didn't required IV pain medications.  - SBP ranging bet 160s-200s on lisinopril 20 mg/ metoprolol 12.5 mg BID and PRN labetalol.  -Pt was seen by Dr. Young, ophthalmology.  Recommend continue on same medications as F/U with him as O/P after DC       Review of Systems   Constitutional: Negative for chills and fever.   HENT: Negative for hearing loss and tinnitus.    Eyes: Positive for blurred vision (R. eye), pain (R. Eye), discharge (Clear) and redness (R. Eye). Negative for double vision and photophobia.   Respiratory: Negative for cough and hemoptysis.    Cardiovascular: Negative for chest pain and palpitations.   Gastrointestinal: Negative for heartburn and nausea.   Genitourinary: Negative for dysuria and urgency.   Musculoskeletal: Negative for myalgias and neck pain.   Skin: Negative for itching and rash.   Neurological: Positive for dizziness. Negative for tingling and headaches.   Endo/Heme/Allergies: Negative for environmental allergies and polydipsia.    Psychiatric/Behavioral: Negative for depression and suicidal ideas.       Disposition/Barriers to discharge:   Pt is living alone, cannot control her sever pain at home alone, her son will be home tomorrow, probably sending her home tomorrow.     Consultants/Specialty  Opthalmology    PCP: Miki Newsome M.D.      Quality Measures  Quality-Core Measures   Reviewed items::  Medications reviewed, Radiology images reviewed, Labs reviewed and EKG reviewed  Norman catheter::  No Norman  DVT prophylaxis pharmacological::  Enoxaparin (Lovenox)          Physical Exam       Vitals:    11/14/18 0900 11/14/18 0930 11/14/18 1146 11/14/18 1400   BP: (!) 204/109 (!) 171/83 (!) 196/97 (!) 177/83   Pulse: 67 73 65 70   Resp: 16  17    Temp: 35.9 °C (96.6 °F)  37.8 °C (100 °F)    SpO2: 93%  93%    Weight:       Height:         Body mass index is 25.83 kg/m². Weight: 72.6 kg (160 lb 0.9 oz)  Oxygen Therapy:  Pulse Oximetry: 93 %, O2 (LPM): 0, O2 Delivery: None (Room Air)    Physical Exam  Constitutional: She is oriented to person, place, and time and well-developed, well-nourished, and in no distress.   HENT:   Head: Normocephalic.   Nose: Nose normal.   Mouth/Throat: Oropharynx is clear and moist. No oropharyngeal exudate.   R. Eyelid swollen to the point of closure  R. Face swollen with tenderness to touch  Multiple Vesicles present at R. Forehead. Consistent with V1 pattern  Mallampati score of 4   Eyes: Pupils are equal, round, and reactive to light. EOM are normal. No scleral icterus.   Injected R. Eye  Periorbital edema of R. Eye   Neck: Normal range of motion. Neck supple. No tracheal deviation present. No thyromegaly present.   Cardiovascular: Normal rate, regular rhythm, normal heart sounds and intact distal pulses.  Exam reveals no friction rub.    No murmur heard.  Pulmonary/Chest: Effort normal and breath sounds normal. No respiratory distress. She has no wheezes.   Abdominal: Soft. Bowel sounds are normal. She exhibits no  distension. There is no tenderness.   Musculoskeletal: Normal range of motion. She exhibits no edema or tenderness.   Neurological: She is alert and oriented to person, place, and time. She has normal reflexes. No cranial nerve deficit. Coordination normal.   Skin: Skin is warm and dry. No rash noted. She is not diaphoretic. No erythema.   Psychiatric: Mood, memory, affect and judgment normal.       Assessment/Plan       * Herpes zoster ophthalmicus of right eye   Assessment & Plan     Acute onset and rapid progression of right periorbital swelling, tenderness with blurry vision and diminished vision  Slit lamp test positive for Punctate Keratitis     Ophthalmology consulted, Dr. Young, he has no concerns about cellulitis and he mentions limited corneal involvement with high chance of no future permanent corneal damage.  Plan:  Continue on Kwaku forte eyedrops  Oral prednisone, will taper it after DC until be seen by ophthalmologist.  IV Acyclovir during hospitalization, will switch to 800 mg 5 times per day tomorrow on DC  Gabapentin 400 mg TID  As needed Roxicodone  Will make outpatient ophthalmology appointment when Dc pt         Poorly-controlled hypertension   Assessment & Plan     Blood pressure at the time of admission was 182/100, probably related to sever neurological pain 2nd to shingles, being on prednisone 60 mg daily.     Plan  Continue Metoprolol 12.5 twice daily  Increase Lisinopril to 40 mg daily starting from tomorrow 11/15/2018  Labetalol IV PRN with parameters.                       Type 2 diabetes mellitus with complication, with long-term current use of insulin (HCC)- (present on admission)   Assessment & Plan     Patient reports hx of type 2 DM  POC glucose of 119  Glycohemoglobin 7 months ago was 6.5     Lantus 14 units q. evening  Humalog sliding scale  hypoglycemia protocol  Metformin 1000 mg twice daily         Lung nodule, multiple- (present on admission)   Assessment & Plan      Pulmonary nodules noted on CT scan in 3/2016 with 4 new nodules measuring up to 4.2 mm found on CT scan done on 7/2018  Current smoker with 10-20 pack years  Patient reports night sweats, however denies any weight changes  Could explain reactivation of herpes zoster possibly due to immunocompromise state                Dyslipidemia- (present on admission)   Assessment & Plan     History of dyslipidemia,  Last lipid panel was on 4/2018  Results for [facility] (MRN [id number]) as of 11/14/2018 18:18   Ref. Range 4/4/2018 08:00   Cholesterol,Tot Latest Ref Range: 100 - 199 mg/dL 90 (L)   Triglycerides Latest Ref Range: 0 - 149 mg/dL 98   HDL Latest Ref Range: >=40 mg/dL 38 (A)   LDL Latest Ref Range: <100 mg/dL 32     New Lipid panel pending  Continue patient on home atorvastatin 40 mg daily

## 2018-11-15 NOTE — CARE PLAN
Problem: Communication  Goal: The ability to communicate needs accurately and effectively will improve  Outcome: PROGRESSING AS EXPECTED  Pt updated on plan of care, white board updated    Problem: Safety  Goal: Will remain free from injury  Outcome: PROGRESSING AS EXPECTED  Pt instructed to use call light, fall edu provided

## 2018-11-15 NOTE — DISCHARGE INSTRUCTIONS
Discharge Instructions    Discharged to home by car with friend. Discharged via wheelchair, hospital escort: Yes.  Special equipment needed: Not Applicable    Be sure to schedule a follow-up appointment with your primary care doctor or any specialists as instructed.     Discharge Plan:   Diet Plan: Discussed  Activity Level: Discussed  Smoking Cessation Offered: Patient Refused  Confirmed Follow up Appointment: Patient to Call and Schedule Appointment  Confirmed Symptoms Management: Discussed  Medication Reconciliation Updated: Yes  Influenza Vaccine Indication: Patient Refuses, Not indicated: Previously immunized this influenza season and > 8 years of age    I understand that a diet low in cholesterol, fat, and sodium is recommended for good health. Unless I have been given specific instructions below for another diet, I accept this instruction as my diet prescription.   Other diet: Cardiac Diabetic    Special Instructions: None    · Is patient discharged on Warfarin / Coumadin?   No       Shingles  Shingles is an infection that causes a painful skin rash and fluid-filled blisters. Shingles is caused by the same virus that causes chickenpox.  Shingles only develops in people who:  · Have had chickenpox.  · Have gotten the chickenpox vaccine. (This is rare.)  The first symptoms of shingles may be itching, tingling, or pain in an area on your skin. A rash will follow in a few days or weeks. The rash is usually on one side of the body in a bandlike or beltlike pattern. Over time, the rash turns into fluid-filled blisters that break open, scab over, and dry up. Medicines may:  · Help you manage pain.  · Help you recover more quickly.  · Help to prevent long-term problems.  Follow these instructions at home:  Medicines  · Take medicines only as told by your doctor.  · Apply an anti-itch or numbing cream to the affected area as told by your doctor.  Blister and Rash Care  · Take a cool bath or put cool compresses on the  area of the rash or blisters as told by your doctor. This may help with pain and itching.  · Keep your rash covered with a loose bandage (dressing). Wear loose-fitting clothing.  · Keep your rash and blisters clean with mild soap and cool water or as told by your doctor.  · Check your rash every day for signs of infection. These include redness, swelling, and pain that lasts or gets worse.  · Do not pick your blisters.  · Do not scratch your rash.  General instructions  · Rest as told by your doctor.  · Keep all follow-up visits as told by your doctor. This is important.  · Until your blisters scab over, your infection can cause chickenpox in people who have never had it or been vaccinated against it. To prevent this from happening, avoid touching other people or being around other people, especially:  ¨ Babies.  ¨ Pregnant women.  ¨ Children who have eczema.  ¨ Elderly people who have transplants.  ¨ People who have chronic illnesses, such as leukemia or AIDS.  Contact a doctor if:  · Your pain does not get better with medicine.  · Your pain does not get better after the rash heals.  · Your rash looks infected. Signs of infection include:  ¨ Redness.  ¨ Swelling.  ¨ Pain that lasts or gets worse.  Get help right away if:  · The rash is on your face or nose.  · You have pain in your face, pain around your eye area, or loss of feeling on one side of your face.  · You have ear pain or you have ringing in your ear.  · You have loss of taste.  · Your condition gets worse.  This information is not intended to replace advice given to you by your health care provider. Make sure you discuss any questions you have with your health care provider.  Document Released: 06/05/2009 Document Revised: 08/13/2017 Document Reviewed: 09/29/2015  [employer] Interactive Patient Education © 2017 [employer]      Depression / Suicide Risk    As you are discharged from this [facility] facility, it is important to learn how to keep safe  from harming yourself.    Recognize the warning signs:  · Abrupt changes in personality, positive or negative- including increase in energy   · Giving away possessions  · Change in eating patterns- significant weight changes-  positive or negative  · Change in sleeping patterns- unable to sleep or sleeping all the time   · Unwillingness or inability to communicate  · Depression  · Unusual sadness, discouragement and loneliness  · Talk of wanting to die  · Neglect of personal appearance   · Rebelliousness- reckless behavior  · Withdrawal from people/activities they love  · Confusion- inability to concentrate     If you or a loved one observes any of these behaviors or has concerns about self-harm, here's what you can do:  · Talk about it- your feelings and reasons for harming yourself  · Remove any means that you might use to hurt yourself (examples: pills, rope, extension cords, firearm)  · Get professional help from the community (Mental Health, Substance Abuse, psychological counseling)  · Do not be alone:Call your Safe Contact- someone whom you trust who will be there for you.  · Call your local CRISIS HOTLINE 562-7766 or 946-937-8324  · Call your local Children's Mobile Crisis Response Team [place] (195) 127-9344 or www.[employer]  · Call the toll free National Suicide Prevention Hotlines   · National Suicide Prevention Lifeline 642-991-BPWJ (3567)  · National Hope Line Network 800-SUICIDE (979-6896)

## 2018-11-15 NOTE — PROGRESS NOTES
Assumed care at 1900, bedside report received from day shift RN. Pt. Is medical an not on the monitor. Initial assessment completed, orders reviewed, call light within reach, and hourly rounding in place. POC addressed with patient, no additional questions at this time.

## 2018-11-15 NOTE — CARE PLAN
Problem: Communication  Goal: The ability to communicate needs accurately and effectively will improve  Outcome: PROGRESSING AS EXPECTED      Problem: Safety  Goal: Will remain free from injury  Outcome: PROGRESSING AS EXPECTED    Goal: Will remain free from falls  Outcome: PROGRESSING AS EXPECTED      Problem: Infection  Goal: Will remain free from infection  Outcome: PROGRESSING AS EXPECTED      Problem: Knowledge Deficit  Goal: Knowledge of disease process/condition, treatment plan, diagnostic tests, and medications will improve  Outcome: PROGRESSING AS EXPECTED      Problem: Pain Management  Goal: Pain level will decrease to patient's comfort goal  Outcome: PROGRESSING AS EXPECTED

## 2018-11-16 ENCOUNTER — PATIENT OUTREACH (OUTPATIENT)
Dept: HEALTH INFORMATION MANAGEMENT | Facility: OTHER | Age: 63
End: 2018-11-16

## 2018-11-16 NOTE — PROGRESS NOTES
Pt discharge, discharge instructions given, IV discontinued, medications reviewed, follow up appointments discussed, pt denies any questions or concerns at this time

## 2018-11-16 NOTE — DISCHARGE SUMMARY
Internal Medicine Discharge Summary  Note Author: Neil Conner M.D.       Name Mary Moore     1955   Age/Sex 63 y.o. female   MRN [id number]         Admit Date:  2018       Discharge Date:   11/15/2018     Service:   [facility] Internal Medicine Purple Team  Attending Physician(s):   Dr. Tinajero       Senior Resident(s):   Dr. Vines  [place] Resident(s):   Dr. Conner  PCP: Miki Newsome M.D.      Primary Diagnosis:   Herpes Zoster Ophthalmicus of right eye     Secondary Diagnoses:                Principal Problem:    Herpes zoster ophthalmicus of right eye POA: Unknown  Active Problems:    Type 2 diabetes mellitus with complication, with long-term current use of insulin (HCC) POA: Yes    Lung nodule, multiple POA: Yes    Pain POA: Unknown    Poorly-controlled hypertension POA: Unknown    Dyslipidemia POA: Yes  Resolved Problems:    * No resolved hospital problems. *      Hospital Summary (Brief Narrative):       Patient is a 63-year-old female presents into the ED for right eye pain with swelling, right-sided facial swelling/tenderness and lesions on right forehead that got significantly worse 2 days prior to admission. In ED, Slit lamp test positive for Punctate Keratitis. Ophthalmologist was consulted.  Pred forte eyedrops, oral prednisone, IV acyclovir were started. Patient was also given as needed Roxicodone with increase in Gabapentin to 400 mg TID. Pain was hard to control given the neuropathic nature and the acute herpes zoster infection. This likely lead to elevation in patient's baseline hypertension. Lisinopril was increased to 40 mg along with as needed Labetalol and home metoprolol. Will defer to Primary care Physician to taper down Lisinopril as the blood pressure normalizes. Opthalmology, Dr. Young was able to see patient in the hospital. As per his recommendation, patient was discharged with Acyclovir to total of 14 days and Prednisone taper.     Outpt appointment set  with Dr. Young on 11/16/2018 at 3:30 PM. No appointments available next week with Dr. Young. Patient was discharged in hemodynamically stable condition.     Patient also has Type 2 Diabetes for which she was started on home metformin, Lantus and Humalog sliding scale.     Consultants:     Opthomology    Procedures:        None    Imaging/ Testing:      No orders to display         Discharge Medications:         Medication Reconciliation: Completed       Medication List      START taking these medications      Instructions   acetaminophen 325 MG Tabs  Commonly known as:  TYLENOL   Take 2 Tabs by mouth every 6 hours as needed (Mild Pain; (Pain scale 1-3); Temp greater than 100.5 F).  Dose:  650 mg     acyclovir 800 MG Tabs  Commonly known as:  ZOVIRAX   Take 1 Tab by mouth 5 Times a Day.  Dose:  800 mg     gabapentin 400 MG Caps  Commonly known as:  NEURONTIN   Take 1 Cap by mouth 3 times a day.  Dose:  400 mg     nicotine 14 MG/24HR Pt24  Commonly known as:  NICODERM   Apply 1 Patch to skin as directed every 24 hours.  Dose:  1 Patch     oxyCODONE immediate-release 5 MG Tabs  Commonly known as:  ROXICODONE   Take 1 Tab by mouth 2 times daily with meals as needed for up to 7 days.  Dose:  5 mg     prednisoLONE acetate 1 % Susp  Commonly known as:  PRED FORTE   Place 1 Drop in both eyes 4 times a day.  Dose:  1 Drop     predniSONE 20 MG Tabs  Start taking on:  11/16/2018  Commonly known as:  DELTASONE   Doctor's comments:  Please take the prednisone according to the following schedule:  1. 3 tab of 20 mg (60 mg in total) for 5 days  Then, 2. Take 2 tab per day (total 40 mg daily) for 3 days  Then, 3. Take 1 tab and half (30 mg total) for 3 days  Then, 4. Take 1 tab for 3 days (20 mg total daily) then 1/2 tab for 3 day  Please take the prednisone according to the mentioned schedule below.        CHANGE how you take these medications      Instructions   lisinopril 30 MG tablet  What changed:  · medication  strength  · how much to take  Commonly known as:  PRINIVIL, ZESTRIL   Take 1 Tab by mouth every day.  Dose:  30 mg        CONTINUE taking these medications      Instructions   amitriptyline 25 MG Tabs  Commonly known as:  ELAVIL   Take 25 mg by mouth every bedtime.  Dose:  25 mg     ARIPiprazole 2 MG tablet  Commonly known as:  ABILIFY   Take 2 mg by mouth every bedtime.  Dose:  2 mg     aspirin EC 81 MG Tbec  Commonly known as:  ECOTRIN   Take 81 mg by mouth every day.  Dose:  81 mg     atorvastatin 40 MG Tabs  Commonly known as:  LIPITOR   Take 1 Tab by mouth every day.  Dose:  40 mg     BASAGLAR KWIKPEN 100 UNIT/ML Sopn   Inject 46 Units as instructed every bedtime.  Dose:  46 Units     fluoxetine 40 MG capsule  Commonly known as:  PROZAC   Take 40 mg by mouth every day.  Dose:  40 mg     metformin 1000 MG tablet  Commonly known as:  GLUCOPHAGE   Take 1 Tab by mouth 2 times a day.  Dose:  1000 mg     metoprolol 25 MG Tabs  Commonly known as:  LOPRESSOR   TAKE 1/2 TABLET BY MOUTH 2 TIMES A DAY.     traZODone 100 MG Tabs  Commonly known as:  DESYREL   Take 100 mg by mouth every bedtime.  Dose:  100 mg        STOP taking these medications    amoxicillin 500 MG Caps  Commonly known as:  AMOXIL     fluticasone 50 MCG/ACT nasal spray  Commonly known as:  FLONASE     PREVACID 24HR 15 MG Cpdr  Generic drug:  lansoprazole            Disposition: Home with Son    Diet:   Diabetic and low sodium    Activity:   As tolerated    Instructions:      Please take medications as prescribed  Please go to opthomology appointment on 11/16/2018 and PCP appointment on 12/5/2018  The patient was instructed to return to the ER in the event of worsening symptoms. I have counseled the patient on the importance of compliance and the patient has agreed to proceed with all medical recommendations and follow up plan indicated above.   The patient understands that all medications come with benefits and risks. Risks may include permanent injury  or death and these risks can be minimized with close reassessment and monitoring.        Primary Care Provider:  Dr. Miki Newsome  Discharge summary faxed to primary care provider:  Deferred  Copy of discharge summary given to the patient: Deferred      Follow up appointment details :        1. Vladislav Young M.D.. Go on 11/16/2018.      Specialty:  Ophthalmology   Why:  PLEASE ARRIVE AT 3:30PM FOR YOUR APPOINTMENT. THANK YOU   Contact information   [address] [place] [address]   [phone]     Dec 05, 2018  9:00 AM PST  Established Patient with Karissa Vick M.D.  [facility] Internal Medicine (--) [address] [place] [address]  [facility] [id number] [phone]     You will be receiving a confirmation call a few days before your appointment from our automated call confirmation system.     Jan 08, 2019 11:00 AM PST  Established Patient with Miki Newsome M.D.  [facility] Internal [facility] (--) [address] [place] [id number]  [phone]     You will be receiving a confirmation call a few days before your appointment from our automated call confirmation system.               Pending Studies:        None    Time spent on discharge day patient visit, preparing discharge paperwork and arranging for patient follow up.    Summary of follow up issues:   Complete resolution of Herpes Zoster Ophthalmicus of right eye   Diabetes mellitus type 2 and hypertension with Primary care physician      Discharge Time (Minutes) :    35  minutes  Hospital Course Type:  Inpatient Stay >2 midnights      Condition on Discharge    ______________________________________________________________________    Interval history/exam for day of discharge:     Please see progress note      Most Recent Labs:    Lab Results   Component Value Date/Time    WBC 7.5 11/15/2018 01:59 AM    RBC 3.80 (L) 11/15/2018 01:59 AM    HEMOGLOBIN 11.5 (L) 11/15/2018 01:59 AM    HEMATOCRIT 35.2  (L) 11/15/2018 01:59 AM    MCV 92.6 11/15/2018 01:59 AM    MCH 30.3 11/15/2018 01:59 AM    MCHC 32.7 (L) 11/15/2018 01:59 AM    MPV 9.7 11/15/2018 01:59 AM    NEUTSPOLYS 52.30 11/15/2018 01:59 AM    LYMPHOCYTES 33.00 11/15/2018 01:59 AM    MONOCYTES 3.70 11/15/2018 01:59 AM    EOSINOPHILS 0.90 11/15/2018 01:59 AM    BASOPHILS 0.00 11/15/2018 01:59 AM      Lab Results   Component Value Date/Time    SODIUM 140 11/15/2018 01:59 AM    POTASSIUM 3.7 11/15/2018 01:59 AM    CHLORIDE 106 11/15/2018 01:59 AM    CO2 26 11/15/2018 01:59 AM    GLUCOSE 140 (H) 11/15/2018 01:59 AM    BUN 14 11/15/2018 01:59 AM    CREATININE 0.69 11/15/2018 01:59 AM    CREATININE 0.7 05/08/2009 12:08 PM      Lab Results   Component Value Date/Time    ALTSGPT 14 11/15/2018 01:59 AM    ASTSGOT 12 11/15/2018 01:59 AM    ALKPHOSPHAT 46 11/15/2018 01:59 AM    TBILIRUBIN 0.2 11/15/2018 01:59 AM    DBILIRUBIN <0.1 07/02/2016 08:30 AM    LIPASE 163 (H) 09/22/2010 04:20 AM    ALBUMIN 3.2 11/15/2018 01:59 AM    GLOBULIN 2.5 11/15/2018 01:59 AM    INR 0.88 08/25/2016 09:16 AM     Lab Results   Component Value Date/Time    PROTHROMBTM 12.0 08/25/2016 09:16 AM    INR 0.88 08/25/2016 09:16 AM

## 2018-11-16 NOTE — PROGRESS NOTES
Internal Medicine Interval Note  Note Author: Neil Conner M.D.     Name Mary Moore     1955   Age/Sex 63 y.o. female   MRN [id number]   Code Status Full Code     After 5PM or if no immediate response to page, please call for cross-coverage  Attending/Team: Tanvi/Skyla See Patient List for primary contact information  Call [phone] to page    1st Call - Day Intern (R1):   Ubaldo 2nd Call - Day Sr. Resident (R2/R3):   Lucy         Reason for interval visit  (Principal Problem)   R. Eye swelling with Redness      Interval Problem Daily Status Update  (24 hours, problem oriented, brief subjective history, new lab/imaging data pertinent to that problem)   Patient was seen resting in bed.  Pt still reports pain. Denies any overnight events. No significant change in R. Eye swelling.     -Pt able to open eye 1/4 way up(Improving)  -Gabapentin to 400 TID. Hard to control pain, given it's neuropathic.   -PRN Roxicodone  -d/c pt today as patient is stable. Outpt appointment set with Dr. Young on 2018 at 3:30 PM. No appointments available next week with Dr. Young.      Review of Systems   Constitutional: Negative for chills and fever.   HENT: Negative for hearing loss and tinnitus.    Eyes: Positive for blurred vision (R. eye), pain (R. Eye), discharge (Clear) and redness (R. Eye). Negative for double vision and photophobia.   Respiratory: Negative for cough and hemoptysis.    Cardiovascular: Negative for chest pain and palpitations.   Gastrointestinal: Negative for heartburn and nausea.   Genitourinary: Negative for dysuria and urgency.   Musculoskeletal: Negative for myalgias and neck pain.   Skin: Negative for itching and rash.   Neurological: Negative for dizziness, tingling and headaches.   Endo/Heme/Allergies: Negative for environmental allergies and polydipsia.   Psychiatric/Behavioral: Negative for depression and suicidal ideas.       Disposition/Barriers to discharge:    Herpes Zoster Opthalmicus     Consultants/Specialty  Opthalmology    PCP: Miki Newsome M.D.      Quality Measures  Quality-Core Measures   Reviewed items::  Medications reviewed, Radiology images reviewed, Labs reviewed and EKG reviewed  Norman catheter::  No Norman  DVT prophylaxis pharmacological::  Enoxaparin (Lovenox)          Physical Exam       Vitals:    11/14/18 2034 11/15/18 0438 11/15/18 0851 11/15/18 1112   BP: 155/75 (!) 186/91 (!) 185/103 156/93   Pulse: 70 73 65 67   Resp: 17 18 18 17   Temp: 36.7 °C (98.1 °F) 36.4 °C (97.5 °F) 37.8 °C (100 °F) 36.9 °C (98.4 °F)   TempSrc: Temporal Temporal Temporal Temporal   SpO2: 97% 96% 92% 92%   Weight:       Height:         Body mass index is 25.83 kg/m².    Oxygen Therapy:  Pulse Oximetry: 92 %, O2 (LPM): 0, O2 Delivery: None (Room Air)    Physical Exam  Constitutional: She is oriented to person, place, and time and well-developed, well-nourished, and in no distress.   HENT:   Head: Normocephalic.   Nose: Nose normal.   Mouth/Throat: Oropharynx is clear and moist. No oropharyngeal exudate.   R. Eyelid swollen to the point of closure  R. Face swollen with tenderness to touch  Multiple Vesicles present at R. Forehead. Consistent with V1 pattern  Mallampati score of 4   Eyes: Pupils are equal, round, and reactive to light. EOM are normal. No scleral icterus.   Injected R. Eye  Periorbital edema of R. Eye   Neck: Normal range of motion. Neck supple. No tracheal deviation present. No thyromegaly present.   Cardiovascular: Normal rate, regular rhythm, normal heart sounds and intact distal pulses.  Exam reveals no friction rub.    No murmur heard.  Pulmonary/Chest: Effort normal and breath sounds normal. No respiratory distress. She has no wheezes.   Abdominal: Soft. Bowel sounds are normal. She exhibits no distension. There is no tenderness.   Musculoskeletal: Normal range of motion. She exhibits no edema or tenderness.   Neurological: She is alert and oriented to  person, place, and time. She has normal reflexes. No cranial nerve deficit. Coordination normal.   Skin: Skin is warm and dry. No rash noted. She is not diaphoretic. No erythema.   Psychiatric: Mood, memory, affect and judgment normal.         Assessment/Plan     * Herpes zoster ophthalmicus of right eye   Assessment & Plan    Acute onset and rapid progression of right periorbital swelling, tenderness with blurry vision and diminished vision  Slit lamp test positive for Punctate Keratitis    Ophthalmology consulted  Pred forte eyedrops  Oral prednisone  Acyclovir  Gabapentin  As needed Roxicodone  Outpatient ophthalmology appointment on 11/16  Pt stable for discharge     Pain   Assessment & Plan    Pain secondary to herpes zoster ophthalmicus with trigeminal V1 distribution    As needed Roxicodone   Gabapentin  Prednisolone taper  Pred forte eyedrops     Lung nodule, multiple- (present on admission)   Assessment & Plan    Pulmonary nodules noted on CT scan in 3/2016 with 4 new nodules measuring up to 4.2 mm found on CT scan done on 7/2018  Current smoker with 10-20 pack years  Stable         Type 2 diabetes mellitus with complication, with long-term current use of insulin (formerly [facility])- (present on admission)   Assessment & Plan    Patient reports hx of type 2 DM  POC glucose at admission of 119  Glycohemoglobin 7 months ago was 6.5    Lantus 14 units q. evening  Humalog sliding scale  hypoglycemia protocol  Metformin 1000 mg twice daily     Poorly-controlled hypertension   Assessment & Plan    Blood pressure at the time of admission was 182/100    Started home medications  -Metoprolol 12.5 twice daily  -Lisinopril 40 mg     Dyslipidemia- (present on admission)   Assessment & Plan    History of dyslipidemia    Started patient on home atorvastatin

## 2018-11-18 ENCOUNTER — PATIENT OUTREACH (OUTPATIENT)
Dept: HEALTH INFORMATION MANAGEMENT | Facility: OTHER | Age: 63
End: 2018-11-18

## 2018-11-18 NOTE — PROGRESS NOTES
"11/18/18 at 10:27 AM--Received incoming VM from pt at 10:06 AM.  Pt states on voicemail that she was discharged from [facility] on 11/15/18 and does not have her discharge instructions.  Placed phone call to pt, left VM with my contact information and instructions to return my phone call in order to review d/c instructions.    11/18/18 at 10:55 AM--Addendum--Received incoming phone call from pt, pt states she needs a \"return to work note\" for her employer s/p hospital discharge 11/15/18.  Transferred pt to Telemetry 8 floor so that she may address this issue.  Pt states she has no further questions or concerns at this time.  "

## 2018-11-26 ENCOUNTER — HOSPITAL ENCOUNTER (OUTPATIENT)
Dept: LAB | Facility: MEDICAL CENTER | Age: 63
End: 2018-11-26
Attending: INTERNAL MEDICINE
Payer: MEDICAID

## 2018-11-26 DIAGNOSIS — E11.8 TYPE 2 DIABETES MELLITUS WITH COMPLICATION, WITH LONG-TERM CURRENT USE OF INSULIN (HCC): ICD-10-CM

## 2018-11-26 DIAGNOSIS — Z79.4 TYPE 2 DIABETES MELLITUS WITH COMPLICATION, WITH LONG-TERM CURRENT USE OF INSULIN (HCC): ICD-10-CM

## 2018-11-26 DIAGNOSIS — E55.9 VITAMIN D DEFICIENCY: ICD-10-CM

## 2018-11-26 LAB
25(OH)D3 SERPL-MCNC: 26 NG/ML (ref 30–100)
CREAT UR-MCNC: 62.7 MG/DL
EST. AVERAGE GLUCOSE BLD GHB EST-MCNC: 163 MG/DL
HBA1C MFR BLD: 7.3 % (ref 0–5.6)
MICROALBUMIN UR-MCNC: 2 MG/DL
MICROALBUMIN/CREAT UR: 32 MG/G (ref 0–30)

## 2018-11-26 PROCEDURE — 82306 VITAMIN D 25 HYDROXY: CPT

## 2018-11-26 PROCEDURE — 36415 COLL VENOUS BLD VENIPUNCTURE: CPT

## 2018-11-26 PROCEDURE — 82570 ASSAY OF URINE CREATININE: CPT

## 2018-11-26 PROCEDURE — 83036 HEMOGLOBIN GLYCOSYLATED A1C: CPT

## 2018-11-26 PROCEDURE — 82043 UR ALBUMIN QUANTITATIVE: CPT

## 2018-11-29 DIAGNOSIS — E55.9 VITAMIN D DEFICIENCY: ICD-10-CM

## 2018-11-29 DIAGNOSIS — H93.19 TINNITUS, UNSPECIFIED LATERALITY: ICD-10-CM

## 2018-11-29 RX ORDER — ACETAMINOPHEN 160 MG
1 TABLET,DISINTEGRATING ORAL DAILY
Qty: 90 CAP | Refills: 3 | Status: SHIPPED | OUTPATIENT
Start: 2018-11-29

## 2018-11-29 NOTE — PROGRESS NOTES
Patient came to the  to ask about the ringing in her ears, the vitamin D, and other labs. She was recently in the hospital with Herpes Zoster Ophthalmicus. Says that she has been having ringing in her ears for the past 3 weeks and it has not gotten any better. I advised her that I will make a referral for her to see ENT especially with the recent medial condition.     Also, she says her vitamin D is not covered by insurance so is wondering if we can send in a different prescription which I advised her that I will send it in but I am not sure if it will be covered. She understood.

## 2018-12-10 ENCOUNTER — TELEPHONE (OUTPATIENT)
Dept: INTERNAL MEDICINE | Facility: MEDICAL CENTER | Age: 63
End: 2018-12-10

## 2018-12-10 NOTE — TELEPHONE ENCOUNTER
VOICEMAIL  1. Caller Name: Mary Byers                      Call Back Number: [phone]    2. Message: pt left voicemail stating ENT office she was referred to will not schedule her an appt because she needs to see Dr. Newsome before being scheduled. Would like clarification on what to do.     3. Patient approves office to leave a detailed voicemail/MyChart message: N\A

## 2018-12-10 NOTE — TELEPHONE ENCOUNTER
Called Dr. Sultana office was informed they need the physical referral in order to schedule pt. They only received and verbal referral and are not able to schedule with a verbal.     Colin can you please resend referral. Thank you.

## 2018-12-11 NOTE — TELEPHONE ENCOUNTER
Called patient and she stated she did not want to wait until the 8th.  Patient will be coming in 12/12/18 at 1:20pm with Dr. Newsome.

## 2018-12-11 NOTE — TELEPHONE ENCOUNTER
If it is possible to get the patient on my schedule before that day, then it is fine otherwise I will see her then. Thanks.

## 2018-12-11 NOTE — TELEPHONE ENCOUNTER
Referral Notes   Number of Notes: 7   Type Date User Summary Attachment   Referral Notes 12/11/2018  9:20 AM Colin Ibarra - -   Note    In basket sent to Dr. Newsome. CC KAMRYN Escobar     Good Morning Dr. Newsome,     I received a phone call from Didi at Dr. Sultana's office. They will not see the patient without office visit notes from her PCP since they are a specialist office. The patient is scheduled to see you January 8th. Could you please place a new referral after that visit. I will fax the new referral along with the office visit notes that day. I will complete this referral.     Thank you  Colin TEAGUE

## 2018-12-11 NOTE — TELEPHONE ENCOUNTER
Colin Ibarra   You; Valdo Childers Ass't 18 hours ago (2:03 PM)      Re faxed to Dr. Middleton. Fax Number: [phone].     Thank you   Colin TEAGUE  (Routing comment)

## 2018-12-12 ENCOUNTER — OFFICE VISIT (OUTPATIENT)
Dept: INTERNAL MEDICINE | Facility: MEDICAL CENTER | Age: 63
End: 2018-12-12
Payer: MEDICAID

## 2018-12-12 VITALS
HEIGHT: 66 IN | BODY MASS INDEX: 24.11 KG/M2 | WEIGHT: 150 LBS | SYSTOLIC BLOOD PRESSURE: 149 MMHG | DIASTOLIC BLOOD PRESSURE: 85 MMHG | HEART RATE: 101 BPM | TEMPERATURE: 99.5 F | OXYGEN SATURATION: 94 %

## 2018-12-12 DIAGNOSIS — B02.30 HERPES ZOSTER OPHTHALMICUS OF RIGHT EYE: ICD-10-CM

## 2018-12-12 DIAGNOSIS — H93.13 TINNITUS OF BOTH EARS: ICD-10-CM

## 2018-12-12 DIAGNOSIS — I10 ESSENTIAL HYPERTENSION, BENIGN: ICD-10-CM

## 2018-12-12 DIAGNOSIS — E78.5 DYSLIPIDEMIA: ICD-10-CM

## 2018-12-12 DIAGNOSIS — K21.9 GASTROESOPHAGEAL REFLUX DISEASE WITHOUT ESOPHAGITIS: ICD-10-CM

## 2018-12-12 PROCEDURE — 99214 OFFICE O/P EST MOD 30 MIN: CPT | Performed by: INTERNAL MEDICINE

## 2018-12-12 NOTE — PROGRESS NOTES
"    Mary Moore is a 63 y.o. female who is here for routine follow up.    CC: Tinnitus    HPI:    Patient is a 63 year old female with an extensive past medical history who is here with complaints of tinnitus which is bilateral. Patient states that it started before her shingles infection. She had gone to Urgent care with complaints of ear pain earlier this year and had an irrigation of her ears done at that time. Another time she went in for ear pain, she was told there is fluid in her ears and was given an antibiotic and a nasal spray. This was all around November 2018. About a week after that, she started complaining of right eye pain, right eye swelling, and a headache. She was then diagnosed with herpes zoster ophthalmicus. She was admitted to the hospital during that time. She is still on the steroids and says is about to be done with them. In regards to the tinnitus, says she thinks \"it is in her head.\" She doesn't know how to describe the sensation. There is no discharge from the ears, no fever, no chills. Does mention her blood pressure was high yesterday and she has been uncomfortable because her \"Face will burn up.\" She is not able to drive because of the burning sensation on her face and difficulty visualizing appropriately as she cannot put a lot of strain on her eye.      Patient says that even though she has some pain in regards to the herpes zoster, she is feeling better. Is following up with Ophthalmologist. With her blood pressure, it is elevated today. She is on Lisinopril which was increased to 30mg. She doesn't any complaints of nausea, vomiting, headaches, vision changes, lightheadedness, dizziness, chest pain, or chest pressure.       No problem-specific Assessment & Plan notes found for this encounter.      She  has a past medical history of Anxiety (6/23/2016); Chest pain (6/23/2016); Constipation (6/23/2016); Dyslipidemia (6/23/2016); Epigastric pain (6/23/2016); Facial rash " (6/23/2016); Fibromyalgia (6/23/2016); GERD (gastroesophageal reflux disease) (6/23/2016); HTN (hypertension) (6/23/2016); Hypercalcemia (6/23/2016); Hyperlipidemia; Hypertension; Insomnia; Lentigo (6/23/2016); Lupus erythematosus (6/23/2016); Multiple pulmonary nodules (6/23/2016); Urinary incontinence (6/23/2016); and Vitamin D deficiency (6/23/2016). She also has no past medical history of Breast cancer ([facility]).    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/12/2018     Priority: High   • Pain 11/12/2018     Priority: High   • Lung nodule, multiple 06/23/2016     Priority: High   • Type 2 diabetes mellitus with complication, with long-term current use of insulin ([facility]) 09/21/2010     Priority: High   • Poorly-controlled hypertension 11/12/2018     Priority: Medium   • Adult respiratory stress syndrome (HCC) 06/02/2017   • Precordial pain 06/02/2017   • Screening mammogram, encounter for 11/29/2016   • Essential hypertension, benign 11/14/2016   • Coronary artery disease, non-occlusive 11/14/2016   • Tobacco use 08/18/2016   • History of lupus 08/18/2016   • Vitamin B12 deficiency 08/15/2016   • Fibromyalgia 06/23/2016   • GERD (gastroesophageal reflux disease) 06/23/2016   • Dyslipidemia 06/23/2016   • Insomnia 06/23/2016   • Anxiety 06/23/2016   • Vitamin D deficiency 06/23/2016       Allergies:Patient has no known allergies.    Current Outpatient Prescriptions   Medication Sig Dispense Refill   • Cholecalciferol (VITAMIN D3) 2000 UNIT Cap Take 1 Cap by mouth every day. 90 Cap 3   • acetaminophen (TYLENOL) 325 MG Tab Take 2 Tabs by mouth every 6 hours as needed (Mild Pain; (Pain scale 1-3); Temp greater than 100.5 F). 30 Tab 0   • gabapentin (NEURONTIN) 400 MG Cap Take 1 Cap by mouth 3 times a day. 90 Cap 1   • nicotine (NICODERM) 14 MG/24HR PATCH 24 HR Apply 1 Patch to skin as directed every 24 hours. 30 Patch 0   • prednisoLONE acetate (PRED FORTE) 1 %  "Suspension Place 1 Drop in both eyes 4 times a day. 1 Bottle 0   • lisinopril (PRINIVIL, ZESTRIL) 30 MG tablet Take 1 Tab by mouth every day. 30 Tab 3   • acyclovir (ZOVIRAX) 800 MG Tab Take 1 Tab by mouth 5 Times a Day. 55 Tab 0   • predniSONE (DELTASONE) 20 MG Tab Please take the prednisone according to the mentioned schedule below. 40 Tab 0   • amitriptyline (ELAVIL) 25 MG Tab Take 25 mg by mouth every bedtime.     • aspirin EC (ECOTRIN) 81 MG Tablet Delayed Response Take 81 mg by mouth every day.     • Insulin Glargine (BASAGLAR KWIKPEN) 100 UNIT/ML Solution Pen-injector Inject 46 Units as instructed every bedtime.     • traZODone (DESYREL) 100 MG Tab Take 100 mg by mouth every bedtime.     • atorvastatin (LIPITOR) 40 MG Tab Take 1 Tab by mouth every day. 90 Tab 0   • fluoxetine (PROZAC) 40 MG capsule Take 40 mg by mouth every day.  1   • metformin (GLUCOPHAGE) 1000 MG tablet Take 1 Tab by mouth 2 times a day. 60 Tab 6   • metoprolol (LOPRESSOR) 25 MG Tab TAKE 1/2 TABLET BY MOUTH 2 TIMES A DAY. 90 Tab 3   • aripiprazole (ABILIFY) 2 MG tablet Take 2 mg by mouth every bedtime.  1     No current facility-administered medications for this visit.        Social History   Substance Use Topics   • Smoking status: Current Some Day Smoker     Packs/day: 0.25     Years: 40.00     Types: Cigarettes     Last attempt to quit: 4/1/2017   • Smokeless tobacco: Never Used      Comment: Infrequest ciggarette smoking.    • Alcohol use No       Family History   Problem Relation Age of Onset   • Heart Disease Father    • Stroke Father      Review of Systems:   Pertinent positives as stated in HPI, all others reviewed as negative.    Physical Exam:  Blood pressure 149/85, pulse (!) 101, temperature 37.5 °C (99.5 °F), temperature source Temporal, height 1.676 m (5' 6\"), weight 68 kg (150 lb), SpO2 94 %, not currently breastfeeding. Body mass index is 24.21 kg/m².    General Appearance: healthy, alert, no distress, cooperative  Skin:  " No rashes or lesions.  Head: Normocephalic. No masses appreciated.   Eyes: PERRLA.  Ears: Eternal ears appear normal, TM intact, no lesions or discharge noted.   Oropharynx: Oropharynx moist and without lesion.  Lungs: Lungs clear to auscultation bilaterally.  Heart: RRR without murmur, gallop, or rubs.  Abdomen: Soft, non-tender. BS normal.   Musculoskeletal: Extremities: Upper and lower extremities appear normal. No deformities, edema.   Peripheral Pulses: Pulses: radial=4/4, dorsalis pedis=4/4  Psychiatric: Mood appears normal.     Assessment/Plan:     1. Tinnitus of both ears  Could be secondary to medication. She is on a few ototoxic medications but has been on those for a while now so unsure if this would cause her symptoms.  Could be related to the Herpes zoster ophthalmicus recently.  Was made a referral to ENT. Will await recommendations.     2. Dyslipidemia  Continue on Atorvastatin.  Reviewed lipid panel earlier this year.   No acute complaints related to this.     3. Essential hypertension, benign  BP noted to be 149/85.   Continue on the Lisinopril and Metoprolol.  Might have to increase the Lisinopril. Discussed with the patient as well.     4. Gastroesophageal reflux disease without esophagitis  No complaints at this time.  Stable.     5. Herpes zoster ophthalmicus of right eye  On Prednisone and Acyclovir.  Sees Ophthalmologist.   Improving but still has some burning on the right side of the face.   No vision changes.     Followup: Return in about 4 weeks (around 1/9/2019), or if symptoms worsen or fail to improve.    This note was created using voice recognition software. There may be unintended errors spelling, and grammar or content.

## 2018-12-12 NOTE — LETTER
December 12, 2018        Mary Moore  [address] [place] [address]        To whom it may concern,    Patient needs to refrain from driving until her current medical condition resolves and she feels safe to drive again.    If you have any questions or concerns, please don't hesitate to call.        Sincerely,        Miki Newsome M.D.    Electronically Signed

## 2018-12-12 NOTE — PATIENT INSTRUCTIONS
Tinnitus  Tinnitus refers to hearing a sound when there is no actual source for that sound. This is often described as ringing in the ears. However, people with this condition may hear a variety of noises. A person may hear the sound in one ear or in both ears.  The sounds of tinnitus can be soft, loud, or somewhere in between. Tinnitus can last for a few seconds or can be constant for days. It may go away without treatment and come back at various times. When tinnitus is constant or happens often, it can lead to other problems, such as trouble sleeping and trouble concentrating.  Almost everyone experiences tinnitus at some point. Tinnitus that is long-lasting (chronic) or comes back often is a problem that may require medical attention.  What are the causes?  The cause of tinnitus is often not known. In some cases, it can result from other problems or conditions, including:  · Exposure to loud noises from machinery, music, or other sources.  · Hearing loss.  · Ear or sinus infections.  · Earwax buildup.  · A foreign object in the ear.  · Use of certain medicines.  · Use of alcohol and caffeine.  · High blood pressure.  · Heart diseases.  · Anemia.  · Allergies.  · Meniere disease.  · Thyroid problems.  · Tumors.  · An enlarged part of a weakened blood vessel (aneurysm).  What are the signs or symptoms?  The main symptom of tinnitus is hearing a sound when there is no source for that sound. It may sound like:  · Buzzing.  · Roaring.  · Ringing.  · Blowing air, similar to the sound heard when you listen to a seashell.  · Hissing.  · Whistling.  · Sizzling.  · Humming.  · Running water.  · A sustained musical note.  How is this diagnosed?  Tinnitus is diagnosed based on your symptoms. Your health care provider will do a physical exam. A comprehensive hearing exam (audiologic exam) will be done if your tinnitus:  · Affects only one ear (unilateral).  · Causes hearing difficulties.  · Lasts 6 months or longer.  You may  also need to see a health care provider who specializes in hearing disorders (audiologist). You may be asked to complete a questionnaire to determine the severity of your tinnitus. Tests may be done to help determine the cause and to rule out other conditions. These can include:  · Imaging studies of your head and brain, such as:  ¨ A CT scan.  ¨ An MRI.  · An imaging study of your blood vessels (angiogram).  How is this treated?  Treating an underlying medical condition can sometimes make tinnitus go away. If your tinnitus continues, other treatments may include:  · Medicines, such as certain antidepressants or sleeping aids.  · Sound generators to mask the tinnitus. These include:  ¨ Tabletop sound machines that play relaxing sounds to help you fall asleep.  ¨ Wearable devices that fit in your ear and play sounds or music.  ¨ A small device that uses headphones to deliver a signal embedded in music (acoustic neural stimulation). In time, this may change the pathways of your brain and make you less sensitive to tinnitus. This device is used for very severe cases when no other treatment is working.  · Therapy and counseling to help you manage the stress of living with tinnitus.  · Using hearing aids or cochlear implants, if your tinnitus is related to hearing loss.  Follow these instructions at home:  · When possible, avoid being in loud places and being exposed to loud sounds.  · Wear hearing protection, such as earplugs, when you are exposed to loud noises.  · Do not take stimulants, such as nicotine, alcohol, or caffeine.  · Practice techniques for reducing stress, such as meditation, yoga, or deep breathing.  · Use a white noise machine, a humidifier, or other devices to mask the sound of tinnitus.  · Sleep with your head slightly raised. This may reduce the impact of tinnitus.  · Try to get plenty of rest each night.  Contact a health care provider if:  · You have tinnitus in just one ear.  · Your tinnitus  continues for 3 weeks or longer without stopping.  · Home care measures are not helping.  · You have tinnitus after a head injury.  · You have tinnitus along with any of the following:  ¨ Dizziness.  ¨ Loss of balance.  ¨ Nausea and vomiting.  This information is not intended to replace advice given to you by your health care provider. Make sure you discuss any questions you have with your health care provider.  Document Released: 12/18/2006 Document Revised: 08/20/2017 Document Reviewed: 05/20/2015  Elsevier Interactive Patient Education © 2017 [employer]

## 2018-12-13 RX ORDER — ATORVASTATIN CALCIUM 40 MG/1
40 TABLET, FILM COATED ORAL
Qty: 90 TAB | Refills: 0 | Status: SHIPPED | OUTPATIENT
Start: 2018-12-13 | End: 2019-03-04 | Stop reason: SDUPTHER

## 2018-12-28 DIAGNOSIS — I25.10 CORONARY ARTERY DISEASE, NON-OCCLUSIVE: ICD-10-CM

## 2019-01-05 DIAGNOSIS — B02.30 HERPES ZOSTER OPHTHALMICUS OF RIGHT EYE: ICD-10-CM

## 2019-01-07 RX ORDER — GABAPENTIN 400 MG/1
CAPSULE ORAL
Qty: 270 CAP | Refills: 0 | Status: SHIPPED | OUTPATIENT
Start: 2019-01-07 | End: 2019-09-27

## 2019-01-07 NOTE — TELEPHONE ENCOUNTER
Last seen: 12/12/18 by Dr. Newsome  Next appt: 01/08/19 with Dr. Newsome    Was the patient seen in the last year in this department? Yes   Does patient have an active prescription for medications requested? No   Received Request Via: Pharmacy

## 2019-01-08 ENCOUNTER — OFFICE VISIT (OUTPATIENT)
Dept: INTERNAL MEDICINE | Facility: MEDICAL CENTER | Age: 64
End: 2019-01-08
Payer: MEDICAID

## 2019-01-08 VITALS
BODY MASS INDEX: 25.23 KG/M2 | TEMPERATURE: 98.6 F | OXYGEN SATURATION: 97 % | DIASTOLIC BLOOD PRESSURE: 106 MMHG | HEIGHT: 66 IN | SYSTOLIC BLOOD PRESSURE: 188 MMHG | WEIGHT: 157 LBS | HEART RATE: 77 BPM

## 2019-01-08 DIAGNOSIS — E78.5 DYSLIPIDEMIA: ICD-10-CM

## 2019-01-08 DIAGNOSIS — H93.13 TINNITUS OF BOTH EARS: ICD-10-CM

## 2019-01-08 DIAGNOSIS — I10 ESSENTIAL HYPERTENSION: ICD-10-CM

## 2019-01-08 DIAGNOSIS — K21.9 GASTROESOPHAGEAL REFLUX DISEASE WITHOUT ESOPHAGITIS: ICD-10-CM

## 2019-01-08 DIAGNOSIS — G44.52 NEW DAILY PERSISTENT HEADACHE: ICD-10-CM

## 2019-01-08 PROCEDURE — 99214 OFFICE O/P EST MOD 30 MIN: CPT | Performed by: INTERNAL MEDICINE

## 2019-01-08 RX ORDER — LISINOPRIL 40 MG/1
40 TABLET ORAL DAILY
Qty: 30 TAB | Refills: 3 | Status: SHIPPED | OUTPATIENT
Start: 2019-01-08 | End: 2019-05-10 | Stop reason: SDUPTHER

## 2019-01-08 RX ORDER — CALCIUM CITRATE/VITAMIN D3 200MG-6.25
TABLET ORAL
Refills: 6 | COMMUNITY
Start: 2019-01-02 | End: 2022-03-11

## 2019-01-08 RX ORDER — BLOOD PRESSURE TEST KIT
1 KIT MISCELLANEOUS ONCE
Qty: 1 KIT | Refills: 0 | Status: SHIPPED
Start: 2019-01-08 | End: 2019-01-08

## 2019-01-08 NOTE — PROGRESS NOTES
Mary Moore is a 63 y.o. female who is here for routine follow up.    CC: Pressure like sensation on the left side of her head, follow up tinnitus    HPI:    Patient is a 63-year-old female who is here for follow-up in regards to her tinnitus.  Patient was seen by the ENT specialist who recommended a full audiology evaluation.  The audiology evaluation indicated that patient needs hearing aids but she feels that there is a pressure-like sensation on the left side of her headache as well.  She initially described this as a tinnitus but says it is more like a pressure headache.  She has been noticing more blurry vision as well.  She does not complain of any lightheadedness, dizziness, weakness, or any changes in sensation.  She wants to know exactly what is going on in regards to this.  She has also been having a higher blood pressure readings recently which could be contributing to the symptoms that she is having.  She recently had an episode of herpes zoster ophthalmicus and is seeing an ophthalmologist. She completed the course of antiviral and steroid therapy.  She is still on the steroid eyedrops.  She is wondering if there is anything that is going on in her head and wants to get further evaluation for this.  She has not had any syncopal episodes, no chest pain, no chest pressure, no shortness of breath, no fever, no chills.  She said the pressure-like sensation is throughout the day and mainly located on the left side of her head.  She does not describe any tearing of her eyes no nasal congestion, and no photophobia.      No problem-specific Assessment & Plan notes found for this encounter.      She  has a past medical history of Anxiety (6/23/2016); Chest pain (6/23/2016); Constipation (6/23/2016); Dyslipidemia (6/23/2016); Epigastric pain (6/23/2016); Facial rash (6/23/2016); Fibromyalgia (6/23/2016); GERD (gastroesophageal reflux disease) (6/23/2016); HTN (hypertension) (6/23/2016); Hypercalcemia  (6/23/2016); Hyperlipidemia; Hypertension; Insomnia; Lentigo (6/23/2016); Lupus erythematosus (6/23/2016); Multiple pulmonary nodules (6/23/2016); Urinary incontinence (6/23/2016); and Vitamin D deficiency (6/23/2016). She also has no past medical history of Breast cancer ([facility]).    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/12/2018     Priority: High   • Pain 11/12/2018     Priority: High   • Lung nodule, multiple 06/23/2016     Priority: High   • Type 2 diabetes mellitus with complication, with long-term current use of insulin ([facility]) 09/21/2010     Priority: High   • Poorly-controlled hypertension 11/12/2018     Priority: Medium   • Adult respiratory stress syndrome (HCC) 06/02/2017   • Precordial pain 06/02/2017   • Screening mammogram, encounter for 11/29/2016   • Essential hypertension, benign 11/14/2016   • Coronary artery disease, non-occlusive 11/14/2016   • Tobacco use 08/18/2016   • History of lupus 08/18/2016   • Vitamin B12 deficiency 08/15/2016   • Fibromyalgia 06/23/2016   • GERD (gastroesophageal reflux disease) 06/23/2016   • Dyslipidemia 06/23/2016   • Insomnia 06/23/2016   • Anxiety 06/23/2016   • Vitamin D deficiency 06/23/2016       Allergies:Patient has no known allergies.    Current Outpatient Prescriptions   Medication Sig Dispense Refill   • TRUE METRIX BLOOD GLUCOSE TEST strip USE TO TEST TWICE DAILY  6   • ANORO ELLIPTA 62.5-25 MCG/INH AEROSOL POWDER, BREATH ACTIVATED inhaler 1 Puff every day.  11   • lisinopril (PRINIVIL, ZESTRIL) 40 MG tablet Take 1 Tab by mouth every day. 30 Tab 3   • gabapentin (NEURONTIN) 400 MG Cap TAKE 1 CAPSULE BY MOUTH THREE TIMES A  Cap 0   • metoprolol (LOPRESSOR) 25 MG Tab TAKE 1/2 TABLET BY MOUTH 2 TIMES A DAY. 90 Tab 2   • atorvastatin (LIPITOR) 40 MG Tab Take 1 Tab by mouth every day. 90 Tab 0   • Cholecalciferol (VITAMIN D3) 2000 UNIT Cap Take 1 Cap by mouth every day. 90 Cap 3   • nicotine  "(NICODERM) 14 MG/24HR PATCH 24 HR Apply 1 Patch to skin as directed every 24 hours. 30 Patch 0   • prednisoLONE acetate (PRED FORTE) 1 % Suspension Place 1 Drop in both eyes 4 times a day. 1 Bottle 0   • amitriptyline (ELAVIL) 25 MG Tab Take 25 mg by mouth every bedtime.     • aspirin EC (ECOTRIN) 81 MG Tablet Delayed Response Take 81 mg by mouth every day.     • Insulin Glargine (BASAGLAR KWIKPEN) 100 UNIT/ML Solution Pen-injector Inject 46 Units as instructed every bedtime.     • fluoxetine (PROZAC) 40 MG capsule Take 40 mg by mouth every day.  1   • metformin (GLUCOPHAGE) 1000 MG tablet Take 1 Tab by mouth 2 times a day. 60 Tab 6   • aripiprazole (ABILIFY) 2 MG tablet Take 2 mg by mouth every bedtime.  1     No current facility-administered medications for this visit.        Social History   Substance Use Topics   • Smoking status: Current Some Day Smoker     Packs/day: 0.25     Years: 40.00     Types: Cigarettes     Last attempt to quit: 4/1/2017   • Smokeless tobacco: Never Used      Comment: Infrequest ciggarette smoking.    • Alcohol use No       Family History   Problem Relation Age of Onset   • Heart Disease Father    • Stroke Father      Review of Systems:   Pertinent positives as stated in HPI, all others reviewed as negative.    Physical Exam:  Blood pressure (!) 188/106, pulse 77, temperature 37 °C (98.6 °F), temperature source Temporal, height 1.676 m (5' 6\"), weight 71.2 kg (157 lb), SpO2 97 %, not currently breastfeeding. Body mass index is 25.34 kg/m².    General Appearance: healthy, alert, no distress, cooperative  Skin: No rashes or lesions.  Head: Normocephalic. No masses appreciated.   Eyes: PERRLA.  Oropharynx: Oropharynx moist and without lesion.  Lungs:  Lungs clear to auscultation bilaterally.  Heart: RRR without murmur, gallop, or rubs.  Abdomen: Soft, non-tender. BS normal.   Musculoskeletal: Extremities: Upper and lower extremities appear normal. No deformities, edema.   Peripheral " Pulses: Pulses: radial=4/4, dorsalis pedis=4/4  Psychiatric: Mood appears normal.     Assessment/Plan:     1. Essential hypertension  /106.   Will increase Lisinopril from 30mg to 40mg.  Potentially, the headache could be a result of uncontrolled htn.   Also, advised patient to get a blood pressure monitor and follow up in 1 month.   - lisinopril (PRINIVIL, ZESTRIL) 40 MG tablet; Take 1 Tab by mouth every day.  Dispense: 30 Tab; Refill: 3  - Blood Pressure Kit; 1 Each by Does not apply route Once for 1 dose.  Dispense: 1 Kit; Refill: 0    2. Gastroesophageal reflux disease without esophagitis  Doing ok for now. No new concerns.     3. Dyslipidemia  Continue on the Atorvastatin.  No new complaints.     4. New daily persistent headache  Could be related to uncontrolled blood pressure, could be a tension headache.  Patient is concerned about this and has been ongoing for a while. Described a buzzing sensation initially but now describes it as a pressure sensation.  Will obtain CT of the head just to make sure there is nothing concerning. Recovering from herpes zoster ophthalmicus as well. Sees Opth.   - CT-HEAD WITH & W/O; Future    5. Bilateral Tinnitus  Saw ENT. Suggesting bilateral hearing aids after audiology evaluation.       Followup: Return in about 4 weeks (around 2/5/2019), or if symptoms worsen or fail to improve.    This note was created using voice recognition software. There may be unintended errors spelling, and grammar or content.

## 2019-01-08 NOTE — PATIENT INSTRUCTIONS
· TAKE LISINOPRIL 40MG. I HAVE INCREASED THE DOSE.  · CHECK YOUR BLOOD PRESSURE DAILY. CHECK IT ABOUT 2 HOURS AFTER TAKING THE MEDICATION.   · IN 2 WEEKS, SEND ME THE READINGS OF YOUR BLOOD PRESSURE TO REVIEW.  · FABY THE CT OF THE HEAD DONE.

## 2019-01-09 DIAGNOSIS — R51.9 NONINTRACTABLE HEADACHE, UNSPECIFIED CHRONICITY PATTERN, UNSPECIFIED HEADACHE TYPE: ICD-10-CM

## 2019-01-15 ENCOUNTER — HOSPITAL ENCOUNTER (OUTPATIENT)
Dept: LAB | Facility: MEDICAL CENTER | Age: 64
End: 2019-01-15
Attending: INTERNAL MEDICINE
Payer: MEDICAID

## 2019-01-15 DIAGNOSIS — R51.9 NONINTRACTABLE HEADACHE, UNSPECIFIED CHRONICITY PATTERN, UNSPECIFIED HEADACHE TYPE: ICD-10-CM

## 2019-01-15 LAB
ANION GAP SERPL CALC-SCNC: 10 MMOL/L (ref 0–11.9)
BUN SERPL-MCNC: 17 MG/DL (ref 8–22)
CALCIUM SERPL-MCNC: 9.4 MG/DL (ref 8.5–10.5)
CHLORIDE SERPL-SCNC: 106 MMOL/L (ref 96–112)
CO2 SERPL-SCNC: 28 MMOL/L (ref 20–33)
CREAT SERPL-MCNC: 0.77 MG/DL (ref 0.5–1.4)
GLUCOSE SERPL-MCNC: 131 MG/DL (ref 65–99)
POTASSIUM SERPL-SCNC: 4.1 MMOL/L (ref 3.6–5.5)
SODIUM SERPL-SCNC: 144 MMOL/L (ref 135–145)

## 2019-01-15 PROCEDURE — 80048 BASIC METABOLIC PNL TOTAL CA: CPT

## 2019-01-15 PROCEDURE — 36415 COLL VENOUS BLD VENIPUNCTURE: CPT

## 2019-01-22 ENCOUNTER — HOSPITAL ENCOUNTER (OUTPATIENT)
Dept: RADIOLOGY | Facility: MEDICAL CENTER | Age: 64
End: 2019-01-22
Attending: INTERNAL MEDICINE
Payer: MEDICAID

## 2019-01-22 DIAGNOSIS — G44.52 NEW DAILY PERSISTENT HEADACHE: ICD-10-CM

## 2019-01-22 PROCEDURE — 700117 HCHG RX CONTRAST REV CODE 255: Performed by: INTERNAL MEDICINE

## 2019-01-22 PROCEDURE — 70470 CT HEAD/BRAIN W/O & W/DYE: CPT

## 2019-01-22 RX ADMIN — IOHEXOL 50 ML: 350 INJECTION, SOLUTION INTRAVENOUS at 08:14

## 2019-01-31 ENCOUNTER — HOSPITAL ENCOUNTER (OUTPATIENT)
Dept: RADIOLOGY | Facility: MEDICAL CENTER | Age: 64
End: 2019-01-31
Attending: INTERNAL MEDICINE
Payer: MEDICAID

## 2019-01-31 DIAGNOSIS — R91.8 PULMONARY NODULES: ICD-10-CM

## 2019-01-31 DIAGNOSIS — J41.8 MIXED SIMPLE AND MUCOPURULENT CHRONIC BRONCHITIS (HCC): ICD-10-CM

## 2019-01-31 PROCEDURE — 71250 CT THORAX DX C-: CPT

## 2019-02-12 ENCOUNTER — OFFICE VISIT (OUTPATIENT)
Dept: INTERNAL MEDICINE | Facility: MEDICAL CENTER | Age: 64
End: 2019-02-12
Payer: MEDICAID

## 2019-02-12 VITALS
OXYGEN SATURATION: 93 % | DIASTOLIC BLOOD PRESSURE: 91 MMHG | HEART RATE: 76 BPM | BODY MASS INDEX: 25.07 KG/M2 | WEIGHT: 156 LBS | TEMPERATURE: 98.2 F | HEIGHT: 66 IN | SYSTOLIC BLOOD PRESSURE: 168 MMHG

## 2019-02-12 DIAGNOSIS — Z23 NEED FOR SHINGLES VACCINE: ICD-10-CM

## 2019-02-12 DIAGNOSIS — R42 DIZZINESS: ICD-10-CM

## 2019-02-12 DIAGNOSIS — I10 ESSENTIAL HYPERTENSION, BENIGN: ICD-10-CM

## 2019-02-12 PROCEDURE — 99213 OFFICE O/P EST LOW 20 MIN: CPT | Performed by: INTERNAL MEDICINE

## 2019-02-12 ASSESSMENT — PATIENT HEALTH QUESTIONNAIRE - PHQ9: CLINICAL INTERPRETATION OF PHQ2 SCORE: 0

## 2019-02-12 NOTE — PROGRESS NOTES
Mary Moore is a 63 y.o. female who is here for routine follow up for dizziness and discuss CT head results.    CC: Dizziness follow up    HPI:    Patient is a 63-year-old female who is presenting today for follow-up on the dizziness and to discuss her imaging results.  We went over the CT scan of the head that she had because of the dizziness she was having and it was nonsignificant.  She also had a CT scan of her chest done through her pulmonologist who is in [place] which she will follow-up with them in regards to.  She says she is still having the dizziness and she feels like it is progressing.  She also has an appointment coming up with the ENT and will be spent following up with them in regards to that.  She is really concerned about the dizziness and we discussed possible medications that could be contributing to this.  She says the dizziness has been got getting worse since she was started on gabapentin.  She has a history of recent herpes ophthalmicus for which she was placed on antiviral medication as well as gabapentin for the pain after that.  She says that the pain is getting better and she has itching from time to time on the right side of her face but otherwise the pain is nonsignificant and she is willing to get off the gabapentin to see if that may improve her dizziness.  We also discussed possibly seeing a neurologist to see if the dizziness might be related to more of a central cause but her CT scan was negative.  She is still having tinnitus as well and she.  She says she has something scheduled today but she is not able to do it because of how dizzy she is feeling.  She does not complain of any headaches, no visual changes, no chest pain, no chest pressure, and no shortness of breath.  Otherwise, she is doing well.      No problem-specific Assessment & Plan notes found for this encounter.      She  has a past medical history of Anxiety (6/23/2016); Chest pain (6/23/2016);  Constipation (6/23/2016); Dyslipidemia (6/23/2016); Epigastric pain (6/23/2016); Facial rash (6/23/2016); Fibromyalgia (6/23/2016); GERD (gastroesophageal reflux disease) (6/23/2016); HTN (hypertension) (6/23/2016); Hypercalcemia (6/23/2016); Hyperlipidemia; Hypertension; Insomnia; Lentigo (6/23/2016); Lupus erythematosus (6/23/2016); Multiple pulmonary nodules (6/23/2016); Urinary incontinence (6/23/2016); and Vitamin D deficiency (6/23/2016). She also has no past medical history of Breast cancer ([facility]).    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/12/2018     Priority: High   • Pain 11/12/2018     Priority: High   • Lung nodule, multiple 06/23/2016     Priority: High   • Type 2 diabetes mellitus with complication, with long-term current use of insulin ([facility]) 09/21/2010     Priority: High   • Poorly-controlled hypertension 11/12/2018     Priority: Medium   • Adult respiratory stress syndrome (HCC) 06/02/2017   • Precordial pain 06/02/2017   • Screening mammogram, encounter for 11/29/2016   • Essential hypertension, benign 11/14/2016   • Coronary artery disease, non-occlusive 11/14/2016   • Tobacco use 08/18/2016   • History of lupus 08/18/2016   • Vitamin B12 deficiency 08/15/2016   • Fibromyalgia 06/23/2016   • GERD (gastroesophageal reflux disease) 06/23/2016   • Dyslipidemia 06/23/2016   • Insomnia 06/23/2016   • Anxiety 06/23/2016   • Vitamin D deficiency 06/23/2016       Allergies:Patient has no known allergies.    Current Outpatient Prescriptions   Medication Sig Dispense Refill   • TRUE METRIX BLOOD GLUCOSE TEST strip USE TO TEST TWICE DAILY  6   • ANORO ELLIPTA 62.5-25 MCG/INH AEROSOL POWDER, BREATH ACTIVATED inhaler 1 Puff every day.  11   • lisinopril (PRINIVIL, ZESTRIL) 40 MG tablet Take 1 Tab by mouth every day. 30 Tab 3   • gabapentin (NEURONTIN) 400 MG Cap TAKE 1 CAPSULE BY MOUTH THREE TIMES A  Cap 0   • metoprolol (LOPRESSOR) 25 MG Tab  "TAKE 1/2 TABLET BY MOUTH 2 TIMES A DAY. 90 Tab 2   • atorvastatin (LIPITOR) 40 MG Tab Take 1 Tab by mouth every day. 90 Tab 0   • Cholecalciferol (VITAMIN D3) 2000 UNIT Cap Take 1 Cap by mouth every day. 90 Cap 3   • amitriptyline (ELAVIL) 25 MG Tab Take 25 mg by mouth every bedtime.     • aspirin EC (ECOTRIN) 81 MG Tablet Delayed Response Take 81 mg by mouth every day.     • Insulin Glargine (BASAGLAR KWIKPEN) 100 UNIT/ML Solution Pen-injector Inject 46 Units as instructed every bedtime.     • fluoxetine (PROZAC) 40 MG capsule Take 40 mg by mouth every day.  1   • metformin (GLUCOPHAGE) 1000 MG tablet Take 1 Tab by mouth 2 times a day. 60 Tab 6   • aripiprazole (ABILIFY) 2 MG tablet Take 2 mg by mouth every bedtime.  1     No current facility-administered medications for this visit.        Social History   Substance Use Topics   • Smoking status: Current Some Day Smoker     Packs/day: 0.25     Years: 40.00     Types: Cigarettes     Last attempt to quit: 4/1/2017   • Smokeless tobacco: Never Used      Comment: Infrequest ciggarette smoking.    • Alcohol use No       Family History   Problem Relation Age of Onset   • Heart Disease Father    • Stroke Father      Review of Systems:   Pertinent positives as stated in HPI, all others reviewed as negative.    Physical Exam:  Blood pressure (!) 168/91, pulse 76, temperature 36.8 °C (98.2 °F), temperature source Temporal, height 1.676 m (5' 6\"), weight 70.8 kg (156 lb), SpO2 93 %, not currently breastfeeding. Body mass index is 25.18 kg/m².    General Appearance: healthy, alert, no distress, cooperative  Skin: No rashes or lesions.  Lungs: Lungs clear to auscultation bilaterally.  Heart: RRR without murmur, gallop, or rubs.  Abdomen: Soft, non-tender. BS normal.   Musculoskeletal: Extremities: Upper and lower extremities appear normal. No deformities, edema.   Psychiatric: Mood appears normal.     Assessment/Plan:     1. Need for shingles vaccine  Written prescription given " to patient for the shingles vaccine as per pharmacy.   - Zoster Vac Recomb Adjuvanted (SHINGRIX) 50 MCG Recon Susp; 0.5 mL by Intramuscular route Once for 1 dose.  Dispense: 0.5 mL; Refill: 0    2. Dizziness  Continuing and possibly progressing according to the patient.  CT head was negative.   She is seeing ENT as well because of the dizziness and tinnitus.  Recommended stopping Gabapentin as the dizziness started around the same time. She says she will do it as the post-herpetic pain is not significant.  Can consider further work up if it continues to be a problem.   No red flags or signs of concern at this time.     3. HTN  Blood pressure elevated today. Patient states poor sleep and dizziness as the reason.  She is on Metoprolol and Lisinopril. Recommended to keep a BP log and bring to next appointment.  This may be contributing to the dizziness actually. She understood and will follow up with BP readings and if still high at next visit, will need further adjustment in her medications.       Followup: Return in about 2 months (around 4/12/2019), or if symptoms worsen or fail to improve.    This note was created using voice recognition software. There may be unintended errors spelling, and grammar or content.

## 2019-02-22 DIAGNOSIS — Z79.899 MEDICATION MANAGEMENT: ICD-10-CM

## 2019-02-22 NOTE — TELEPHONE ENCOUNTER
Last seen: 02/12/19 by Dr. Newsome  Next appt: 05/14/19 with Dr. Newsome    Was the patient seen in the last year in this department? Yes   Does patient have an active prescription for medications requested? No   Received Request Via: Pharmacy

## 2019-03-04 RX ORDER — ATORVASTATIN CALCIUM 40 MG/1
40 TABLET, FILM COATED ORAL
Qty: 90 TAB | Refills: 0 | Status: SHIPPED | OUTPATIENT
Start: 2019-03-04 | End: 2019-05-31 | Stop reason: SDUPTHER

## 2019-03-07 RX ORDER — INSULIN GLARGINE 100 [IU]/ML
46 INJECTION, SOLUTION SUBCUTANEOUS
Qty: 15 ML | Refills: 0 | Status: SHIPPED | OUTPATIENT
Start: 2019-03-07 | End: 2019-04-11 | Stop reason: SDUPTHER

## 2019-04-11 RX ORDER — INSULIN GLARGINE 100 [IU]/ML
46 INJECTION, SOLUTION SUBCUTANEOUS
Qty: 15 ML | Refills: 0 | Status: SHIPPED | OUTPATIENT
Start: 2019-04-11 | End: 2022-03-11

## 2019-04-16 ENCOUNTER — OFFICE VISIT (OUTPATIENT)
Dept: INTERNAL MEDICINE | Facility: MEDICAL CENTER | Age: 64
End: 2019-04-16
Payer: MEDICAID

## 2019-04-16 VITALS
BODY MASS INDEX: 24.88 KG/M2 | HEART RATE: 72 BPM | TEMPERATURE: 98.3 F | HEIGHT: 66 IN | DIASTOLIC BLOOD PRESSURE: 68 MMHG | SYSTOLIC BLOOD PRESSURE: 110 MMHG | WEIGHT: 154.8 LBS | OXYGEN SATURATION: 95 %

## 2019-04-16 DIAGNOSIS — H10.021 PINK EYE DISEASE OF RIGHT EYE: ICD-10-CM

## 2019-04-16 PROCEDURE — 99213 OFFICE O/P EST LOW 20 MIN: CPT | Mod: GE | Performed by: INTERNAL MEDICINE

## 2019-04-16 RX ORDER — VALACYCLOVIR HYDROCHLORIDE 1 G/1
1000 TABLET, FILM COATED ORAL 3 TIMES DAILY
Qty: 21 TAB | Refills: 0 | Status: SHIPPED | OUTPATIENT
Start: 2019-04-16 | End: 2019-09-27

## 2019-04-16 RX ORDER — OFLOXACIN 3 MG/ML
1-2 SOLUTION/ DROPS OPHTHALMIC 4 TIMES DAILY
Qty: 1 BOTTLE | Refills: 0 | Status: SHIPPED | OUTPATIENT
Start: 2019-04-16 | End: 2019-05-14

## 2019-04-16 ASSESSMENT — ENCOUNTER SYMPTOMS
FEVER: 0
BRUISES/BLEEDS EASILY: 0
DEPRESSION: 0
MYALGIAS: 0
NAUSEA: 0
COUGH: 0
POLYDIPSIA: 0
BACK PAIN: 0
ORTHOPNEA: 0
EYE PAIN: 0
HEARTBURN: 0
BLURRED VISION: 0
CHILLS: 0
FOCAL WEAKNESS: 0
DIZZINESS: 0
SPUTUM PRODUCTION: 0
HEADACHES: 0

## 2019-04-16 ASSESSMENT — LIFESTYLE VARIABLES: SUBSTANCE_ABUSE: 0

## 2019-04-16 NOTE — PATIENT INSTRUCTIONS
- please start taking the new medication until you see the ophthalmology   - please follow up with the eye doctor as soon as possible     Bacterial Conjunctivitis  Introduction  Bacterial conjunctivitis is an infection of your conjunctiva. This is the clear membrane that covers the white part of your eye and the inner surface of your eyelid. This condition can make your eye:  · Red or pink.  · Itchy.  This condition is caused by bacteria. This condition spreads very easily from person to person (is contagious) and from one eye to the other eye.  Follow these instructions at home:  Medicines  · Take or apply your antibiotic medicine as told by your doctor. Do not stop taking or applying the antibiotic even if you start to feel better.  · Take or apply over-the-counter and prescription medicines only as told by your doctor.  · Do not touch your eyelid with the eye drop bottle or the ointment tube.  Managing discomfort  · Wipe any fluid from your eye with a warm, wet washcloth or a cotton ball.  · Place a cool, clean washcloth on your eye. Do this for 10-20 minutes, 3-4 times per day.  General instructions  · Do not wear contact lenses until the irritation is gone. Wear glasses until your doctor says it is okay to wear contacts.  · Do not wear eye makeup until your symptoms are gone. Throw away any old makeup.  · Change or wash your pillowcase every day.  · Do not share towels or washcloths with anyone.  · Wash your hands often with soap and water. Use paper towels to dry your hands.  · Do not touch or rub your eyes.  · Do not drive or use heavy machinery if your vision is blurry.  Contact a doctor if:  · You have a fever.  · Your symptoms do not get better after 10 days.  Get help right away if:  · You have a fever and your symptoms suddenly get worse.  · You have very bad pain when you move your eye.  · Your face:  ¨ Hurts.  ¨ Is red.  ¨ Is swollen.  · You have sudden loss of vision.  This information is not intended  to replace advice given to you by your health care provider. Make sure you discuss any questions you have with your health care provider.  Document Released: 09/26/2009 Document Revised: 05/25/2017 Document Reviewed: 09/29/2016  © 2017 [employer]

## 2019-04-16 NOTE — PROGRESS NOTES
Established Patient    Mary presents today with the following:    CC: Right pinkeye    HPI: Mary Moore is a 63 y.o. female with extensive past medical history including type 2 diabetes, herpes zoster ophthalmicus of the right eye, fibromyalgia, hypertension, dyslipidemia, gastroesophageal reflux disease, insomnia, anxiety the and coronary artery disease presented to the clinic for acute visit of right pinkeye.  Was admitted to the hospital in November 2018 and diagnosed with herpes zoster ophthalmicus of the right eye, she was seen by ophthalmology in the hospital, treated with IV acyclovir and then switched to oral acyclovir and prednisone, she has been following up with ophthalmology, last seen about 6 weeks ago and she was told to switch her medication to over-the-counter lubricants.  Over the last week patient noticed increasing redness of her right eye associated with blurry vision feeling like a full movement in front of her eye and intermittent photophobia.  Also patient report watery discharge but sometimes thick and it is difficult to open the eye in the morning.  Denies pain, fever, skin changes around the IN blisters.        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/12/2018     Priority: High   • Pain 11/12/2018     Priority: High   • Lung nodule, multiple 06/23/2016     Priority: High   • Type 2 diabetes mellitus with complication, with long-term current use of insulin ([facility]) 09/21/2010     Priority: High   • Poorly-controlled hypertension 11/12/2018     Priority: Medium   • Adult respiratory stress syndrome (HCC) 06/02/2017   • Precordial pain 06/02/2017   • Screening mammogram, encounter for 11/29/2016   • Essential hypertension, benign 11/14/2016   • Coronary artery disease, non-occlusive 11/14/2016   • Tobacco use 08/18/2016   • History of lupus 08/18/2016   • Vitamin B12 deficiency 08/15/2016   • Fibromyalgia 06/23/2016   • GERD  (gastroesophageal reflux disease) 06/23/2016   • Dyslipidemia 06/23/2016   • Insomnia 06/23/2016   • Anxiety 06/23/2016   • Vitamin D deficiency 06/23/2016       Current Outpatient Prescriptions   Medication Sig Dispense Refill   • valacyclovir (VALTREX) 1 GM Tab Take 1 Tab by mouth 3 times a day. 21 Tab 0   • ofloxacin (OCUFLOX) 0.3 % Solution Place 1-2 Drops in both eyes 4 times a day. 1 Bottle 0   • Insulin Glargine (BASAGLAR KWIKPEN) 100 UNIT/ML Solution Pen-injector Inject 46 Units as instructed every bedtime. 15 mL 0   • atorvastatin (LIPITOR) 40 MG Tab Take 1 Tab by mouth every day. 90 Tab 0   • metformin (GLUCOPHAGE) 1000 MG tablet TAKE 1 TABLET BY MOUTH TWICE A  Tab 0   • TRUE METRIX BLOOD GLUCOSE TEST strip USE TO TEST TWICE DAILY  6   • ANORO ELLIPTA 62.5-25 MCG/INH AEROSOL POWDER, BREATH ACTIVATED inhaler 1 Puff every day.  11   • lisinopril (PRINIVIL, ZESTRIL) 40 MG tablet Take 1 Tab by mouth every day. 30 Tab 3   • gabapentin (NEURONTIN) 400 MG Cap TAKE 1 CAPSULE BY MOUTH THREE TIMES A  Cap 0   • metoprolol (LOPRESSOR) 25 MG Tab TAKE 1/2 TABLET BY MOUTH 2 TIMES A DAY. 90 Tab 2   • Cholecalciferol (VITAMIN D3) 2000 UNIT Cap Take 1 Cap by mouth every day. 90 Cap 3   • amitriptyline (ELAVIL) 25 MG Tab Take 25 mg by mouth every bedtime.     • aspirin EC (ECOTRIN) 81 MG Tablet Delayed Response Take 81 mg by mouth every day.     • fluoxetine (PROZAC) 40 MG capsule Take 40 mg by mouth every day.  1   • aripiprazole (ABILIFY) 2 MG tablet Take 2 mg by mouth every bedtime.  1     No current facility-administered medications for this visit.        ROS: As per HPI. Additional pertinent systems as noted below.  Review of Systems   Constitutional: Negative for chills and fever.   HENT: Negative for ear discharge and hearing loss.    Eyes: Negative for blurred vision and pain.   Respiratory: Negative for cough and sputum production.    Cardiovascular: Negative for chest pain and orthopnea.    "  Gastrointestinal: Negative for heartburn and nausea.   Genitourinary: Negative for dysuria and hematuria.   Musculoskeletal: Negative for back pain and myalgias.   Skin: Negative for itching and rash.   Neurological: Negative for dizziness, focal weakness and headaches.   Endo/Heme/Allergies: Negative for polydipsia. Does not bruise/bleed easily.   Psychiatric/Behavioral: Negative for depression and substance abuse.         /68 (BP Location: Left arm, Patient Position: Sitting, BP Cuff Size: Adult)   Pulse 72   Temp 36.8 °C (98.3 °F)   Ht 1.676 m (5' 6\")   Wt 70.2 kg (154 lb 12.8 oz)   SpO2 95%   BMI 24.99 kg/m²     Physical Exam   Constitutional: She is oriented to person, place, and time and well-developed, well-nourished, and in no distress. No distress.   HENT:   Head: Normocephalic and atraumatic.   Eyes: Pupils are equal, round, and reactive to light. EOM are normal. Right conjunctiva is injected. Right conjunctiva has no hemorrhage. No scleral icterus.   Neck: No thyromegaly present.   Cardiovascular: Normal rate and normal heart sounds.    Pulmonary/Chest: Effort normal. No respiratory distress. She has no wheezes.   Abdominal: Soft. She exhibits no distension.   Musculoskeletal: She exhibits no edema.   Lymphadenopathy:     She has no cervical adenopathy.   Neurological: She is alert and oriented to person, place, and time.   Skin: No rash noted. She is not diaphoretic. No erythema.   Psychiatric: Affect and judgment normal.       Note: I have reviewed all pertinent labs and diagnostic tests associated with this visit with specific comments listed under the assessment and plan below    Assessment and Plan    1. Pink eye disease of right eye  -History of hospital admissions due to herpes zoster of the umbilicus in November 2018.  -Was treated with IV acyclovir then switched to oral acyclovir and prednisone.  -Following up with ophthalmology as an outpatient.  -Complaining of one-week history of " pinkeye associated with blurred vision, discharge and photophobia.  -Antiviral medication was discontinued by ophthalmology 6 weeks ago.  -On examination conjunctival injection, pupils are equal and reactive to light.  -Discussed with the patient that her previous zoster ophthalmologist cannot be ruled out.  We will treat her with ofloxacin and oral valacyclovir to cover both bacterial conjunctivitis and herpes zoster ophthalmicus.  Instructed the patient to call ophthalmology and follow-up urgently.        Followup: Return if symptoms worsen or fail to improve.      Signed by: Benjie Hayes M.D.

## 2019-05-10 DIAGNOSIS — I10 ESSENTIAL HYPERTENSION: ICD-10-CM

## 2019-05-10 RX ORDER — LISINOPRIL 40 MG/1
40 TABLET ORAL DAILY
Qty: 30 TAB | Refills: 3 | Status: SHIPPED | OUTPATIENT
Start: 2019-05-10 | End: 2019-09-27 | Stop reason: SDUPTHER

## 2019-05-10 NOTE — TELEPHONE ENCOUNTER
Last seen: 4/16/19 by Dr. Hayes  Next appt: 5/14/19 with Dr. Newsome    Was the patient seen in the last year in this department? Yes   Does patient have an active prescription for medications requested? No   Received Request Via: Pharmacy and patient

## 2019-05-14 ENCOUNTER — OFFICE VISIT (OUTPATIENT)
Dept: INTERNAL MEDICINE | Facility: MEDICAL CENTER | Age: 64
End: 2019-05-14
Payer: MEDICAID

## 2019-05-14 VITALS
SYSTOLIC BLOOD PRESSURE: 141 MMHG | DIASTOLIC BLOOD PRESSURE: 88 MMHG | WEIGHT: 148 LBS | OXYGEN SATURATION: 93 % | TEMPERATURE: 98.3 F | HEIGHT: 66 IN | HEART RATE: 73 BPM | BODY MASS INDEX: 23.78 KG/M2

## 2019-05-14 DIAGNOSIS — D64.9 ANEMIA, UNSPECIFIED TYPE: ICD-10-CM

## 2019-05-14 DIAGNOSIS — Z23 NEED FOR SHINGLES VACCINE: ICD-10-CM

## 2019-05-14 DIAGNOSIS — E55.9 VITAMIN D DEFICIENCY: ICD-10-CM

## 2019-05-14 DIAGNOSIS — E11.8 TYPE 2 DIABETES MELLITUS WITH COMPLICATION, WITH LONG-TERM CURRENT USE OF INSULIN (HCC): ICD-10-CM

## 2019-05-14 DIAGNOSIS — I10 ESSENTIAL HYPERTENSION, BENIGN: ICD-10-CM

## 2019-05-14 DIAGNOSIS — Z79.4 TYPE 2 DIABETES MELLITUS WITH COMPLICATION, WITH LONG-TERM CURRENT USE OF INSULIN (HCC): ICD-10-CM

## 2019-05-14 PROCEDURE — 99214 OFFICE O/P EST MOD 30 MIN: CPT | Performed by: INTERNAL MEDICINE

## 2019-05-14 RX ORDER — BLOOD PRESSURE TEST KIT
1 KIT MISCELLANEOUS ONCE
Qty: 1 KIT | Refills: 0 | Status: SHIPPED
Start: 2019-05-14 | End: 2019-05-14

## 2019-05-14 NOTE — PROGRESS NOTES
Mary Moore is a 63 y.o. female who is here for routine follow up.    CC: Routine follow-up, hypertension, diabetes    HPI:    Patient is a 63-year-old female who is presenting to the clinic today for a routine follow-up.  Patient was seen earlier with complaints of possibly a pinkeye.  She was given eyedrops which she said did not really work well for her but then she saw her eye specialist who gave her a few additional eyedrops including prednisone eyedrops which helped her symptoms quite a bit and she says she is doing well.  She is also wondering if she can get a prescription for the shingles vaccine because that is what she was told from the pharmacy that she needed to have that.    She says that she is still feeling a little slow in the morning overall has seen an improvement.  For her diabetes, she does see an endocrinologist and is due to follow-up in the next month.  She is on medication does not have any complaints in that regards.  For her hypertension, she is on lisinopril, metoprolol.  She says she is doing well on these medication.  She has not been checking her blood pressure at home and so it was recommended to her to get a blood pressure cuff for home so that she can check and bring it to the next visit so we can go over the readings.  She does not have any complaints of headaches, vision changes, lightheadedness, dizziness, chest pain, or any shortness of breath at this time.  She does have a history of vitamin D deficiency as well but all so we need to repeat those labs and she is okay with that.  She does not have any new complaints today.    She has lost weight and she said that she has not had an appetite because of the diabetic medication that she is on.  She said that she is not eating as much as she used to.  She does not complain of any lumps or bumps anywhere, no unusual fatigue.  She will be due for mammogram in June this year.  Is up-to-date on colonoscopy.    No  problem-specific Assessment & Plan notes found for this encounter.      She  has a past medical history of Anxiety (6/23/2016); Chest pain (6/23/2016); Constipation (6/23/2016); Dyslipidemia (6/23/2016); Epigastric pain (6/23/2016); Facial rash (6/23/2016); Fibromyalgia (6/23/2016); GERD (gastroesophageal reflux disease) (6/23/2016); HTN (hypertension) (6/23/2016); Hypercalcemia (6/23/2016); Hyperlipidemia; Hypertension; Insomnia; Lentigo (6/23/2016); Lupus erythematosus (6/23/2016); Multiple pulmonary nodules (6/23/2016); Urinary incontinence (6/23/2016); and Vitamin D deficiency (6/23/2016). She also has no past medical history of Breast cancer ([facility]).    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/12/2018     Priority: High   • Pain 11/12/2018     Priority: High   • Lung nodule, multiple 06/23/2016     Priority: High   • Type 2 diabetes mellitus with complication, with long-term current use of insulin (HCC) 09/21/2010     Priority: High   • Poorly-controlled hypertension 11/12/2018     Priority: Medium   • Adult respiratory stress syndrome (HCC) 06/02/2017   • Precordial pain 06/02/2017   • Screening mammogram, encounter for 11/29/2016   • Essential hypertension, benign 11/14/2016   • Coronary artery disease, non-occlusive 11/14/2016   • Tobacco use 08/18/2016   • History of lupus 08/18/2016   • Vitamin B12 deficiency 08/15/2016   • Fibromyalgia 06/23/2016   • GERD (gastroesophageal reflux disease) 06/23/2016   • Dyslipidemia 06/23/2016   • Insomnia 06/23/2016   • Anxiety 06/23/2016   • Vitamin D deficiency 06/23/2016       Allergies:Patient has no known allergies.    Current Outpatient Prescriptions   Medication Sig Dispense Refill   • lisinopril (PRINIVIL, ZESTRIL) 40 MG tablet Take 1 Tab by mouth every day. 30 Tab 3   • valacyclovir (VALTREX) 1 GM Tab Take 1 Tab by mouth 3 times a day. 21 Tab 0   • Insulin Glargine (BASAGLAR KWIKPEN) 100 UNIT/ML Solution  "Pen-injector Inject 46 Units as instructed every bedtime. 15 mL 0   • atorvastatin (LIPITOR) 40 MG Tab Take 1 Tab by mouth every day. 90 Tab 0   • metformin (GLUCOPHAGE) 1000 MG tablet TAKE 1 TABLET BY MOUTH TWICE A  Tab 0   • TRUE METRIX BLOOD GLUCOSE TEST strip USE TO TEST TWICE DAILY  6   • ANORO ELLIPTA 62.5-25 MCG/INH AEROSOL POWDER, BREATH ACTIVATED inhaler 1 Puff every day.  11   • metoprolol (LOPRESSOR) 25 MG Tab TAKE 1/2 TABLET BY MOUTH 2 TIMES A DAY. 90 Tab 2   • Cholecalciferol (VITAMIN D3) 2000 UNIT Cap Take 1 Cap by mouth every day. 90 Cap 3   • aspirin EC (ECOTRIN) 81 MG Tablet Delayed Response Take 81 mg by mouth every day.     • gabapentin (NEURONTIN) 400 MG Cap TAKE 1 CAPSULE BY MOUTH THREE TIMES A DAY (Patient not taking: Reported on 5/14/2019) 270 Cap 0   • amitriptyline (ELAVIL) 25 MG Tab Take 25 mg by mouth every bedtime.     • fluoxetine (PROZAC) 40 MG capsule Take 40 mg by mouth every day.  1   • aripiprazole (ABILIFY) 2 MG tablet Take 2 mg by mouth every bedtime.  1     No current facility-administered medications for this visit.        Social History   Substance Use Topics   • Smoking status: Current Some Day Smoker     Packs/day: 0.25     Years: 40.00     Types: Cigarettes     Last attempt to quit: 4/1/2017   • Smokeless tobacco: Never Used      Comment: Infrequest ciggarette smoking.    • Alcohol use No       Family History   Problem Relation Age of Onset   • Heart Disease Father    • Stroke Father      Review of Systems:   Pertinent positives as stated in HPI, all others reviewed as negative.    Physical Exam:  /88 (BP Location: Left arm, Patient Position: Sitting, BP Cuff Size: Adult)   Pulse 73   Temp 36.8 °C (98.3 °F) (Temporal)   Ht 1.676 m (5' 6\")   Wt 67.1 kg (148 lb)   SpO2 93%  Body mass index is 23.89 kg/m².    General Appearance: healthy, alert, no distress, cooperative  Skin: No rashes or lesions.  Head: Normocephalic. No masses appreciated.   Eyes: " PERRLA.  Oropharynx: Oropharynx moist and without lesion.  Lungs: Lungs clear to auscultation bilaterally.  Heart: RRR without murmur, gallop, or rubs.  Abdomen: Soft, non-tender. BS normal.   Musculoskeletal: Extremities: Upper and lower extremities appear normal. No deformities, edema.   Peripheral Pulses: Pulses: radial=4/4, dorsalis pedis=4/4  Psychiatric: Mood appears normal.     Assessment/Plan:     1. Need for shingles vaccine  - Zoster Vac Recomb Adjuvanted (SHINGRIX) 50 MCG/0.5ML Recon Susp; 0.5 mL by Intramuscular route Once for 1 dose.  Dispense: 0.5 mL; Refill: 0    2. Essential hypertension, benign  BP is elevated today.  Continue current medications as prescribed.  Patient was also given instructions on how to contact care chest in case she might qualify for free blood pressure kit.  Patient recommended to check it on a regular basis and write down the numbers and bring it to the next appointment.  - Blood Pressure Kit; 1 Each by Does not apply route Once for 1 dose.  Dispense: 1 Kit; Refill: 0    3. Type 2 diabetes mellitus with complication, with long-term current use of insulin (HCC)  She does see an endocrinologist.  Is stable on the medications and her last hemoglobin A1c was within range.  Has not had labs done in a while so we will order them for now.  Is seeing the endocrinologist next month.  She is following up with ophthalmologist on a regular basis.  No new concerns.  Microalbumin creatinine was done in November and it was elevated at 32.  She is on an ACE inhibitor.  - Comp Metabolic Panel; Future  - HEMOGLOBIN A1C; Future  - Lipid Profile; Future    4. Vitamin D deficiency  - VITAMIN D,25 HYDROXY; Future    5. Anemia, unspecified type  She had a low hemoglobin level and that was when she was in the hospital.  No repeats so we will order a CBC as well.  - CBC WITH DIFFERENTIAL; Future      Followup: Return in about 3 months (around 8/14/2019), or if symptoms worsen or fail to  improve.    This note was created using voice recognition software. There may be unintended errors spelling, and grammar or content.

## 2019-05-16 ENCOUNTER — HOSPITAL ENCOUNTER (OUTPATIENT)
Dept: LAB | Facility: MEDICAL CENTER | Age: 64
End: 2019-05-16
Attending: INTERNAL MEDICINE
Payer: MEDICAID

## 2019-05-16 DIAGNOSIS — D64.9 ANEMIA, UNSPECIFIED TYPE: ICD-10-CM

## 2019-05-16 DIAGNOSIS — E55.9 VITAMIN D DEFICIENCY: ICD-10-CM

## 2019-05-16 DIAGNOSIS — Z79.4 TYPE 2 DIABETES MELLITUS WITH COMPLICATION, WITH LONG-TERM CURRENT USE OF INSULIN (HCC): ICD-10-CM

## 2019-05-16 DIAGNOSIS — E11.8 TYPE 2 DIABETES MELLITUS WITH COMPLICATION, WITH LONG-TERM CURRENT USE OF INSULIN (HCC): ICD-10-CM

## 2019-05-16 LAB
25(OH)D3 SERPL-MCNC: 26 NG/ML (ref 30–100)
ALBUMIN SERPL BCP-MCNC: 4 G/DL (ref 3.2–4.9)
ALBUMIN/GLOB SERPL: 1.5 G/DL
ALP SERPL-CCNC: 54 U/L (ref 30–99)
ALT SERPL-CCNC: 16 U/L (ref 2–50)
ANION GAP SERPL CALC-SCNC: 6 MMOL/L (ref 0–11.9)
AST SERPL-CCNC: 18 U/L (ref 12–45)
BASOPHILS # BLD AUTO: 0.6 % (ref 0–1.8)
BASOPHILS # BLD: 0.04 K/UL (ref 0–0.12)
BILIRUB SERPL-MCNC: 0.3 MG/DL (ref 0.1–1.5)
BUN SERPL-MCNC: 17 MG/DL (ref 8–22)
CALCIUM SERPL-MCNC: 8.9 MG/DL (ref 8.5–10.5)
CHLORIDE SERPL-SCNC: 109 MMOL/L (ref 96–112)
CHOLEST SERPL-MCNC: 102 MG/DL (ref 100–199)
CO2 SERPL-SCNC: 25 MMOL/L (ref 20–33)
CREAT SERPL-MCNC: 0.66 MG/DL (ref 0.5–1.4)
CREAT UR-MCNC: 195.6 MG/DL
EOSINOPHIL # BLD AUTO: 0.4 K/UL (ref 0–0.51)
EOSINOPHIL NFR BLD: 6 % (ref 0–6.9)
ERYTHROCYTE [DISTWIDTH] IN BLOOD BY AUTOMATED COUNT: 53.1 FL (ref 35.9–50)
EST. AVERAGE GLUCOSE BLD GHB EST-MCNC: 157 MG/DL
FASTING STATUS PATIENT QL REPORTED: NORMAL
GLOBULIN SER CALC-MCNC: 2.6 G/DL (ref 1.9–3.5)
GLUCOSE SERPL-MCNC: 111 MG/DL (ref 65–99)
HBA1C MFR BLD: 7.1 % (ref 0–5.6)
HCT VFR BLD AUTO: 43.8 % (ref 37–47)
HDLC SERPL-MCNC: 41 MG/DL
HGB BLD-MCNC: 14 G/DL (ref 12–16)
IMM GRANULOCYTES # BLD AUTO: 0.01 K/UL (ref 0–0.11)
IMM GRANULOCYTES NFR BLD AUTO: 0.1 % (ref 0–0.9)
LDLC SERPL CALC-MCNC: 45 MG/DL
LYMPHOCYTES # BLD AUTO: 2.12 K/UL (ref 1–4.8)
LYMPHOCYTES NFR BLD: 31.6 % (ref 22–41)
MCH RBC QN AUTO: 31.3 PG (ref 27–33)
MCHC RBC AUTO-ENTMCNC: 32 G/DL (ref 33.6–35)
MCV RBC AUTO: 98 FL (ref 81.4–97.8)
MICROALBUMIN UR-MCNC: 8.1 MG/DL
MICROALBUMIN/CREAT UR: 41 MG/G (ref 0–30)
MONOCYTES # BLD AUTO: 0.44 K/UL (ref 0–0.85)
MONOCYTES NFR BLD AUTO: 6.6 % (ref 0–13.4)
NEUTROPHILS # BLD AUTO: 3.7 K/UL (ref 2–7.15)
NEUTROPHILS NFR BLD: 55.1 % (ref 44–72)
NRBC # BLD AUTO: 0 K/UL
NRBC BLD-RTO: 0 /100 WBC
PLATELET # BLD AUTO: 235 K/UL (ref 164–446)
PMV BLD AUTO: 10.2 FL (ref 9–12.9)
POTASSIUM SERPL-SCNC: 4.2 MMOL/L (ref 3.6–5.5)
PROT SERPL-MCNC: 6.6 G/DL (ref 6–8.2)
RBC # BLD AUTO: 4.47 M/UL (ref 4.2–5.4)
SODIUM SERPL-SCNC: 140 MMOL/L (ref 135–145)
TRIGL SERPL-MCNC: 78 MG/DL (ref 0–149)
WBC # BLD AUTO: 6.7 K/UL (ref 4.8–10.8)

## 2019-05-16 PROCEDURE — 82306 VITAMIN D 25 HYDROXY: CPT

## 2019-05-16 PROCEDURE — 80061 LIPID PANEL: CPT

## 2019-05-16 PROCEDURE — 85025 COMPLETE CBC W/AUTO DIFF WBC: CPT

## 2019-05-16 PROCEDURE — 83036 HEMOGLOBIN GLYCOSYLATED A1C: CPT

## 2019-05-16 PROCEDURE — 80053 COMPREHEN METABOLIC PANEL: CPT

## 2019-05-16 PROCEDURE — 82043 UR ALBUMIN QUANTITATIVE: CPT

## 2019-05-16 PROCEDURE — 36415 COLL VENOUS BLD VENIPUNCTURE: CPT

## 2019-05-16 PROCEDURE — 82570 ASSAY OF URINE CREATININE: CPT

## 2019-05-21 DIAGNOSIS — Z79.899 MEDICATION MANAGEMENT: ICD-10-CM

## 2019-05-21 NOTE — TELEPHONE ENCOUNTER
Last seen: 5/14/19 by Dr. castillo   Next appt: 7/23/19 with Dr. castillo     Was the patient seen in the last year in this department? Yes   Does patient have an active prescription for medications requested? No   Received Request Via: Pharmacy

## 2019-05-31 RX ORDER — ATORVASTATIN CALCIUM 40 MG/1
40 TABLET, FILM COATED ORAL
Qty: 90 TAB | Refills: 0 | Status: SHIPPED | OUTPATIENT
Start: 2019-05-31 | End: 2019-09-27 | Stop reason: SDUPTHER

## 2019-05-31 NOTE — TELEPHONE ENCOUNTER
Last seen: 5/14/19 by Dr. Newsome  Next appt: 7/23/19 with Dr. Newsome    Was the patient seen in the last year in this department? Yes   Does patient have an active prescription for medications requested? No   Received Request Via: Pharmacy

## 2019-07-17 ENCOUNTER — HOSPITAL ENCOUNTER (OUTPATIENT)
Dept: RADIOLOGY | Facility: MEDICAL CENTER | Age: 64
End: 2019-07-17
Attending: INTERNAL MEDICINE
Payer: MEDICAID

## 2019-07-17 DIAGNOSIS — R91.8 PULMONARY NODULES/LESIONS, MULTIPLE: ICD-10-CM

## 2019-07-17 PROCEDURE — 71250 CT THORAX DX C-: CPT

## 2019-09-19 ENCOUNTER — HOSPITAL ENCOUNTER (OUTPATIENT)
Dept: LAB | Facility: MEDICAL CENTER | Age: 64
End: 2019-09-19
Attending: INTERNAL MEDICINE
Payer: MEDICAID

## 2019-09-19 LAB
25(OH)D3 SERPL-MCNC: 25 NG/ML (ref 30–100)
ALBUMIN SERPL BCP-MCNC: 4.4 G/DL (ref 3.2–4.9)
ALBUMIN/GLOB SERPL: 1.6 G/DL
ALP SERPL-CCNC: 57 U/L (ref 30–99)
ALT SERPL-CCNC: 15 U/L (ref 2–50)
ANION GAP SERPL CALC-SCNC: 9 MMOL/L (ref 0–11.9)
AST SERPL-CCNC: 17 U/L (ref 12–45)
BASOPHILS # BLD AUTO: 0.4 % (ref 0–1.8)
BASOPHILS # BLD: 0.03 K/UL (ref 0–0.12)
BILIRUB SERPL-MCNC: 0.3 MG/DL (ref 0.1–1.5)
BUN SERPL-MCNC: 27 MG/DL (ref 8–22)
CALCIUM SERPL-MCNC: 9.7 MG/DL (ref 8.5–10.5)
CHLORIDE SERPL-SCNC: 107 MMOL/L (ref 96–112)
CHOLEST SERPL-MCNC: 120 MG/DL (ref 100–199)
CO2 SERPL-SCNC: 25 MMOL/L (ref 20–33)
CREAT SERPL-MCNC: 0.59 MG/DL (ref 0.5–1.4)
CREAT UR-MCNC: 88.2 MG/DL
EOSINOPHIL # BLD AUTO: 0.44 K/UL (ref 0–0.51)
EOSINOPHIL NFR BLD: 6.2 % (ref 0–6.9)
ERYTHROCYTE [DISTWIDTH] IN BLOOD BY AUTOMATED COUNT: 48.7 FL (ref 35.9–50)
EST. AVERAGE GLUCOSE BLD GHB EST-MCNC: 151 MG/DL
FASTING STATUS PATIENT QL REPORTED: NORMAL
GLOBULIN SER CALC-MCNC: 2.8 G/DL (ref 1.9–3.5)
GLUCOSE SERPL-MCNC: 108 MG/DL (ref 65–99)
HBA1C MFR BLD: 6.9 % (ref 0–5.6)
HCT VFR BLD AUTO: 44.9 % (ref 37–47)
HDLC SERPL-MCNC: 41 MG/DL
HGB BLD-MCNC: 14.4 G/DL (ref 12–16)
IMM GRANULOCYTES # BLD AUTO: 0.01 K/UL (ref 0–0.11)
IMM GRANULOCYTES NFR BLD AUTO: 0.1 % (ref 0–0.9)
LDLC SERPL CALC-MCNC: 57 MG/DL
LYMPHOCYTES # BLD AUTO: 1.41 K/UL (ref 1–4.8)
LYMPHOCYTES NFR BLD: 19.9 % (ref 22–41)
MCH RBC QN AUTO: 32 PG (ref 27–33)
MCHC RBC AUTO-ENTMCNC: 32.1 G/DL (ref 33.6–35)
MCV RBC AUTO: 99.8 FL (ref 81.4–97.8)
MICROALBUMIN UR-MCNC: 2.5 MG/DL
MICROALBUMIN/CREAT UR: 28 MG/G (ref 0–30)
MONOCYTES # BLD AUTO: 0.55 K/UL (ref 0–0.85)
MONOCYTES NFR BLD AUTO: 7.7 % (ref 0–13.4)
NEUTROPHILS # BLD AUTO: 4.66 K/UL (ref 2–7.15)
NEUTROPHILS NFR BLD: 65.7 % (ref 44–72)
NRBC # BLD AUTO: 0 K/UL
NRBC BLD-RTO: 0 /100 WBC
PLATELET # BLD AUTO: 272 K/UL (ref 164–446)
PMV BLD AUTO: 10.9 FL (ref 9–12.9)
POTASSIUM SERPL-SCNC: 4 MMOL/L (ref 3.6–5.5)
PROT SERPL-MCNC: 7.2 G/DL (ref 6–8.2)
RBC # BLD AUTO: 4.5 M/UL (ref 4.2–5.4)
SODIUM SERPL-SCNC: 141 MMOL/L (ref 135–145)
TRIGL SERPL-MCNC: 109 MG/DL (ref 0–149)
TSH SERPL DL<=0.005 MIU/L-ACNC: 1.58 UIU/ML (ref 0.38–5.33)
WBC # BLD AUTO: 7.1 K/UL (ref 4.8–10.8)

## 2019-09-19 PROCEDURE — 85025 COMPLETE CBC W/AUTO DIFF WBC: CPT

## 2019-09-19 PROCEDURE — 80061 LIPID PANEL: CPT

## 2019-09-19 PROCEDURE — 82306 VITAMIN D 25 HYDROXY: CPT

## 2019-09-19 PROCEDURE — 82570 ASSAY OF URINE CREATININE: CPT

## 2019-09-19 PROCEDURE — 82043 UR ALBUMIN QUANTITATIVE: CPT

## 2019-09-19 PROCEDURE — 80053 COMPREHEN METABOLIC PANEL: CPT

## 2019-09-19 PROCEDURE — 36415 COLL VENOUS BLD VENIPUNCTURE: CPT

## 2019-09-19 PROCEDURE — 83036 HEMOGLOBIN GLYCOSYLATED A1C: CPT

## 2019-09-19 PROCEDURE — 84443 ASSAY THYROID STIM HORMONE: CPT

## 2019-09-26 DIAGNOSIS — I25.10 CORONARY ARTERY DISEASE, NON-OCCLUSIVE: ICD-10-CM

## 2019-09-27 ENCOUNTER — OFFICE VISIT (OUTPATIENT)
Dept: CARDIOLOGY | Facility: MEDICAL CENTER | Age: 64
End: 2019-09-27
Payer: MEDICAID

## 2019-09-27 VITALS
SYSTOLIC BLOOD PRESSURE: 116 MMHG | WEIGHT: 146 LBS | DIASTOLIC BLOOD PRESSURE: 60 MMHG | HEART RATE: 72 BPM | OXYGEN SATURATION: 94 % | BODY MASS INDEX: 23.46 KG/M2 | HEIGHT: 66 IN

## 2019-09-27 DIAGNOSIS — Z72.0 TOBACCO USE: ICD-10-CM

## 2019-09-27 DIAGNOSIS — I10 ESSENTIAL HYPERTENSION: Chronic | ICD-10-CM

## 2019-09-27 DIAGNOSIS — I25.10 CORONARY ARTERY DISEASE, NON-OCCLUSIVE: ICD-10-CM

## 2019-09-27 PROCEDURE — 99214 OFFICE O/P EST MOD 30 MIN: CPT | Mod: 25 | Performed by: INTERNAL MEDICINE

## 2019-09-27 PROCEDURE — 99406 BEHAV CHNG SMOKING 3-10 MIN: CPT | Performed by: INTERNAL MEDICINE

## 2019-09-27 RX ORDER — ATORVASTATIN CALCIUM 40 MG/1
40 TABLET, FILM COATED ORAL
Qty: 90 TAB | Refills: 3 | Status: SHIPPED | OUTPATIENT
Start: 2019-09-27 | End: 2021-03-31

## 2019-09-27 RX ORDER — LISINOPRIL 40 MG/1
40 TABLET ORAL DAILY
Qty: 90 TAB | Refills: 3 | Status: SHIPPED | OUTPATIENT
Start: 2019-09-27 | End: 2021-03-31

## 2019-09-27 RX ORDER — VARENICLINE TARTRATE 1 MG/1
1 TABLET, FILM COATED ORAL 2 TIMES DAILY
Qty: 60 TAB | Refills: 3 | Status: SHIPPED | OUTPATIENT
Start: 2019-09-27 | End: 2022-04-25

## 2019-09-27 ASSESSMENT — ENCOUNTER SYMPTOMS
WEAKNESS: 0
BLURRED VISION: 0
COUGH: 0
DIZZINESS: 0
SORE THROAT: 0
PALPITATIONS: 0
CHILLS: 0
SHORTNESS OF BREATH: 0
NAUSEA: 0
FEVER: 0
ABDOMINAL PAIN: 0
FALLS: 0
FOCAL WEAKNESS: 0
PND: 0
CLAUDICATION: 0
NERVOUS/ANXIOUS: 1
BRUISES/BLEEDS EASILY: 0

## 2019-09-27 NOTE — PROGRESS NOTES
Chief Complaint   Patient presents with   • Coronary Artery Disease   • HTN (Controlled)       Subjective:   Mary Moore is a 64 y.o. female who presents today for follow-up of her history of coronary disease based on minimal disease on angiogram in the past hypertension dyslipidemia setting of diabetes    She is still frustrated struggles with smoking cessation she is really interested in quit smoking and has tried Chantix in the past but found that she did not get good information on how to take it    Otherwise she is doing great with her medications her labs and blood pressure is well controlled    Past Medical History:   Diagnosis Date   • Anxiety 6/23/2016   • Chest pain 6/23/2016   • Constipation 6/23/2016   • Dyslipidemia 6/23/2016   • Epigastric pain 6/23/2016   • Facial rash 6/23/2016   • Fibromyalgia 6/23/2016   • GERD (gastroesophageal reflux disease) 6/23/2016   • HTN (hypertension) 6/23/2016   • Hypercalcemia 6/23/2016   • Hyperlipidemia    • Hypertension     bordeline    • Insomnia     chronic   • Lentigo 6/23/2016   • Lupus erythematosus 6/23/2016   • Multiple pulmonary nodules 6/23/2016   • Urinary incontinence 6/23/2016   • Vitamin D deficiency 6/23/2016     Past Surgical History:   Procedure Laterality Date   • RECOVERY  8/25/2016    Procedure: CATH LAB-[facility] W/POSSIBLE-RITU;  Surgeon: Recoveryonly Surgery;  Location: SURGERY PRE-POST [facility] UNIT [facility] [place], [place];  Service:      Family History   Problem Relation Age of Onset   • Heart Disease Father    • Stroke Father      Social History     Socioeconomic History   • Marital status:      Spouse name: Not on file   • Number of children: Not on file   • Years of education: Not on file   • Highest education level: Not on file   Occupational History   • Not on file   Social Needs   • Financial resource strain: Not on file   • Food insecurity:     Worry: Not on file     Inability: Not on file   • Transportation needs:     Medical: Not on file      Non-medical: Not on file   Tobacco Use   • Smoking status: Current Some Day Smoker     Packs/day: 0.25     Years: 40.00     Pack years: 10.00     Types: Cigarettes     Last attempt to quit: 2017     Years since quittin.4   • Smokeless tobacco: Never Used   • Tobacco comment: Infrequest ciggarette smoking.    Substance and Sexual Activity   • Alcohol use: No     Alcohol/week: 0.0 oz   • Drug use: No   • Sexual activity: Not Currently     Partners: Male   Lifestyle   • Physical activity:     Days per week: Not on file     Minutes per session: Not on file   • Stress: Not on file   Relationships   • Social connections:     Talks on phone: Not on file     Gets together: Not on file     Attends Nondenominational service: Not on file     Active member of club or organization: Not on file     Attends meetings of clubs or organizations: Not on file     Relationship status: Not on file   • Intimate partner violence:     Fear of current or ex partner: Not on file     Emotionally abused: Not on file     Physically abused: Not on file     Forced sexual activity: Not on file   Other Topics Concern   • Not on file   Social History Narrative   • Not on file     No Known Allergies  Outpatient Encounter Medications as of 2019   Medication Sig Dispense Refill   • varenicline (CHANTIX STARTING MONTH ABEBE) 0.5 MG X 11 & 1 MG X 42 tablet Use as directed 56 Tab 3   • varenicline (CHANTIX CONTINUING MONTH ) 1 MG tablet Take 1 Tab by mouth 2 times a day. 60 Tab 3   • atorvastatin (LIPITOR) 40 MG Tab Take 1 Tab by mouth every day. 90 Tab 3   • metoprolol (LOPRESSOR) 25 MG Tab Take 0.5 Tabs by mouth 2 times a day. 90 Tab 3   • lisinopril (PRINIVIL, ZESTRIL) 40 MG tablet Take 1 Tab by mouth every day. 90 Tab 3   • metformin (GLUCOPHAGE) 1000 MG tablet TAKE 1 TABLET BY MOUTH TWICE A  Tab 0   • Insulin Glargine (BASAGLAR KWIKPEN) 100 UNIT/ML Solution Pen-injector Inject 46 Units as instructed every bedtime. 15 mL 0   • TRUE METRIX  BLOOD GLUCOSE TEST strip USE TO TEST TWICE DAILY  6   • ANORO ELLIPTA 62.5-25 MCG/INH AEROSOL POWDER, BREATH ACTIVATED inhaler 1 Puff every day.  11   • Cholecalciferol (VITAMIN D3) 2000 UNIT Cap Take 1 Cap by mouth every day. 90 Cap 3   • aspirin EC (ECOTRIN) 81 MG Tablet Delayed Response Take 81 mg by mouth every day.     • [DISCONTINUED] atorvastatin (LIPITOR) 40 MG Tab Take 1 Tab by mouth every day. 90 Tab 0   • [DISCONTINUED] lisinopril (PRINIVIL, ZESTRIL) 40 MG tablet Take 1 Tab by mouth every day. 30 Tab 3   • [DISCONTINUED] valacyclovir (VALTREX) 1 GM Tab Take 1 Tab by mouth 3 times a day. 21 Tab 0   • [DISCONTINUED] gabapentin (NEURONTIN) 400 MG Cap TAKE 1 CAPSULE BY MOUTH THREE TIMES A DAY (Patient not taking: Reported on 5/14/2019) 270 Cap 0   • [DISCONTINUED] metoprolol (LOPRESSOR) 25 MG Tab TAKE 1/2 TABLET BY MOUTH 2 TIMES A DAY. 90 Tab 2   • [DISCONTINUED] amitriptyline (ELAVIL) 25 MG Tab Take 25 mg by mouth every bedtime.     • [DISCONTINUED] fluoxetine (PROZAC) 40 MG capsule Take 40 mg by mouth every day.  1   • [DISCONTINUED] aripiprazole (ABILIFY) 2 MG tablet Take 2 mg by mouth every bedtime.  1     No facility-administered encounter medications on file as of 9/27/2019.      Review of Systems   Constitutional: Negative for chills and fever.   HENT: Negative for sore throat.    Eyes: Negative for blurred vision.   Respiratory: Negative for cough and shortness of breath.    Cardiovascular: Negative for chest pain, palpitations, claudication, leg swelling and PND.   Gastrointestinal: Negative for abdominal pain and nausea.   Musculoskeletal: Negative for falls and joint pain.   Skin: Negative for rash.   Neurological: Negative for dizziness, focal weakness and weakness.   Endo/Heme/Allergies: Does not bruise/bleed easily.   Psychiatric/Behavioral: The patient is nervous/anxious.         Objective:   /60 (BP Location: Left arm, Patient Position: Sitting, BP Cuff Size: Adult)   Pulse 72   Ht  "1.676 m (5' 6\")   Wt 66.2 kg (146 lb)   SpO2 94%   BMI 23.57 kg/m²     Physical Exam   Constitutional: No distress.   HENT:   Mouth/Throat: Oropharynx is clear and moist. No oropharyngeal exudate.   Eyes: No scleral icterus.   Neck: No JVD present.   Cardiovascular: Normal rate and normal heart sounds. Exam reveals no gallop and no friction rub.   No murmur heard.  Pulmonary/Chest: No respiratory distress. She has no wheezes. She has no rales.   Abdominal: Soft. Bowel sounds are normal.   Musculoskeletal: She exhibits no edema.   Neurological: She is alert.   Skin: No rash noted. She is not diaphoretic.   Psychiatric: She has a normal mood and affect.     We reviewed in person the most recent labs  Recent Results (from the past 4032 hour(s))   MICROALBUMIN CREAT RATIO URINE    Collection Time: 05/16/19  9:25 AM   Result Value Ref Range    Creatinine, Urine 195.60 mg/dL    Microalbumin, Urine Random 8.1 mg/dL    Micro Alb Creat Ratio 41 (H) 0 - 30 mg/g   FASTING STATUS    Collection Time: 05/16/19  9:27 AM   Result Value Ref Range    Fasting Status Fasting    Comp Metabolic Panel    Collection Time: 05/16/19 10:07 AM   Result Value Ref Range    Sodium 140 135 - 145 mmol/L    Potassium 4.2 3.6 - 5.5 mmol/L    Chloride 109 96 - 112 mmol/L    Co2 25 20 - 33 mmol/L    Anion Gap 6.0 0.0 - 11.9    Glucose 111 (H) 65 - 99 mg/dL    Bun 17 8 - 22 mg/dL    Creatinine 0.66 0.50 - 1.40 mg/dL    Calcium 8.9 8.5 - 10.5 mg/dL    AST(SGOT) 18 12 - 45 U/L    ALT(SGPT) 16 2 - 50 U/L    Alkaline Phosphatase 54 30 - 99 U/L    Total Bilirubin 0.3 0.1 - 1.5 mg/dL    Albumin 4.0 3.2 - 4.9 g/dL    Total Protein 6.6 6.0 - 8.2 g/dL    Globulin 2.6 1.9 - 3.5 g/dL    A-G Ratio 1.5 g/dL   HEMOGLOBIN A1C    Collection Time: 05/16/19 10:07 AM   Result Value Ref Range    Glycohemoglobin 7.1 (H) 0.0 - 5.6 %    Est Avg Glucose 157 mg/dL   Lipid Profile    Collection Time: 05/16/19 10:07 AM   Result Value Ref Range    Cholesterol,Tot 102 100 - 199 " mg/dL    Triglycerides 78 0 - 149 mg/dL    HDL 41 >=40 mg/dL    LDL 45 <100 mg/dL   CBC WITH DIFFERENTIAL    Collection Time: 05/16/19 10:07 AM   Result Value Ref Range    WBC 6.7 4.8 - 10.8 K/uL    RBC 4.47 4.20 - 5.40 M/uL    Hemoglobin 14.0 12.0 - 16.0 g/dL    Hematocrit 43.8 37.0 - 47.0 %    MCV 98.0 (H) 81.4 - 97.8 fL    MCH 31.3 27.0 - 33.0 pg    MCHC 32.0 (L) 33.6 - 35.0 g/dL    RDW 53.1 (H) 35.9 - 50.0 fL    Platelet Count 235 164 - 446 K/uL    MPV 10.2 9.0 - 12.9 fL    Neutrophils-Polys 55.10 44.00 - 72.00 %    Lymphocytes 31.60 22.00 - 41.00 %    Monocytes 6.60 0.00 - 13.40 %    Eosinophils 6.00 0.00 - 6.90 %    Basophils 0.60 0.00 - 1.80 %    Immature Granulocytes 0.10 0.00 - 0.90 %    Nucleated RBC 0.00 /100 WBC    Neutrophils (Absolute) 3.70 2.00 - 7.15 K/uL    Lymphs (Absolute) 2.12 1.00 - 4.80 K/uL    Monos (Absolute) 0.44 0.00 - 0.85 K/uL    Eos (Absolute) 0.40 0.00 - 0.51 K/uL    Baso (Absolute) 0.04 0.00 - 0.12 K/uL    Immature Granulocytes (abs) 0.01 0.00 - 0.11 K/uL    NRBC (Absolute) 0.00 K/uL   VITAMIN D,25 HYDROXY    Collection Time: 05/16/19 10:07 AM   Result Value Ref Range    25-Hydroxy   Vitamin D 25 26 (L) 30 - 100 ng/mL   ESTIMATED GFR    Collection Time: 05/16/19 10:07 AM   Result Value Ref Range    GFR If African American >60 >60 mL/min/1.73 m 2    GFR If Non African American >60 >60 mL/min/1.73 m 2   VITAMIN D,25 HYDROXY    Collection Time: 09/19/19  9:21 AM   Result Value Ref Range    25-Hydroxy   Vitamin D 25 25 (L) 30 - 100 ng/mL   MICROALBUMIN CREAT RATIO URINE    Collection Time: 09/19/19  9:21 AM   Result Value Ref Range    Creatinine, Urine 88.20 mg/dL    Microalbumin, Urine Random 2.5 mg/dL    Micro Alb Creat Ratio 28 0 - 30 mg/g   CBC WITH DIFFERENTIAL    Collection Time: 09/19/19  9:21 AM   Result Value Ref Range    WBC 7.1 4.8 - 10.8 K/uL    RBC 4.50 4.20 - 5.40 M/uL    Hemoglobin 14.4 12.0 - 16.0 g/dL    Hematocrit 44.9 37.0 - 47.0 %    MCV 99.8 (H) 81.4 - 97.8 fL    MCH  32.0 27.0 - 33.0 pg    MCHC 32.1 (L) 33.6 - 35.0 g/dL    RDW 48.7 35.9 - 50.0 fL    Platelet Count 272 164 - 446 K/uL    MPV 10.9 9.0 - 12.9 fL    Neutrophils-Polys 65.70 44.00 - 72.00 %    Lymphocytes 19.90 (L) 22.00 - 41.00 %    Monocytes 7.70 0.00 - 13.40 %    Eosinophils 6.20 0.00 - 6.90 %    Basophils 0.40 0.00 - 1.80 %    Immature Granulocytes 0.10 0.00 - 0.90 %    Nucleated RBC 0.00 /100 WBC    Neutrophils (Absolute) 4.66 2.00 - 7.15 K/uL    Lymphs (Absolute) 1.41 1.00 - 4.80 K/uL    Monos (Absolute) 0.55 0.00 - 0.85 K/uL    Eos (Absolute) 0.44 0.00 - 0.51 K/uL    Baso (Absolute) 0.03 0.00 - 0.12 K/uL    Immature Granulocytes (abs) 0.01 0.00 - 0.11 K/uL    NRBC (Absolute) 0.00 K/uL   TSH WITH REFLEX TO FT4    Collection Time: 09/19/19  9:21 AM   Result Value Ref Range    TSH 1.580 0.380 - 5.330 uIU/mL   ESTIMATED GFR    Collection Time: 09/19/19  9:21 AM   Result Value Ref Range    GFR If African American >60 >60 mL/min/1.73 m 2    GFR If Non African American >60 >60 mL/min/1.73 m 2   Comp Metabolic Panel    Collection Time: 09/19/19  9:21 AM   Result Value Ref Range    Sodium 141 135 - 145 mmol/L    Potassium 4.0 3.6 - 5.5 mmol/L    Chloride 107 96 - 112 mmol/L    Co2 25 20 - 33 mmol/L    Anion Gap 9.0 0.0 - 11.9    Glucose 108 (H) 65 - 99 mg/dL    Bun 27 (H) 8 - 22 mg/dL    Creatinine 0.59 0.50 - 1.40 mg/dL    Calcium 9.7 8.5 - 10.5 mg/dL    AST(SGOT) 17 12 - 45 U/L    ALT(SGPT) 15 2 - 50 U/L    Alkaline Phosphatase 57 30 - 99 U/L    Total Bilirubin 0.3 0.1 - 1.5 mg/dL    Albumin 4.4 3.2 - 4.9 g/dL    Total Protein 7.2 6.0 - 8.2 g/dL    Globulin 2.8 1.9 - 3.5 g/dL    A-G Ratio 1.6 g/dL   Lipid Profile    Collection Time: 09/19/19  9:21 AM   Result Value Ref Range    Cholesterol,Tot 120 100 - 199 mg/dL    Triglycerides 109 0 - 149 mg/dL    HDL 41 >=40 mg/dL    LDL 57 <100 mg/dL   FASTING STATUS    Collection Time: 09/19/19  9:21 AM   Result Value Ref Range    Fasting Status Fasting    HEMOGLOBIN A1C     Collection Time: 09/19/19  9:21 AM   Result Value Ref Range    Glycohemoglobin 6.9 (H) 0.0 - 5.6 %    Est Avg Glucose 151 mg/dL         Assessment:     1. Essential hypertension  lisinopril (PRINIVIL, ZESTRIL) 40 MG tablet   2. Coronary artery disease, non-occlusive  metoprolol (LOPRESSOR) 25 MG Tab   3. Tobacco use         Medical Decision Making:  Today's Assessment / Status / Plan:       It was my pleasure to meet with Ms. Moore.    Blood pressure is well controlled.      She is on appropriate statin.    I spent 5 minutes of face to face counseling on the vital need for smoking cessation.  We discussed pharmacotherapy measures for quiting including nicotine replacement.  We decided to use Chantix and discussed specifically the risks of suicidal ideation    I will see Ms. Moore back in 1 year time and encouraged her to follow up with us over the phone or electronically using my MyChart as issues arise.    It is my pleasure to participate in the care of Ms. Moore.  Please do not hesitate to contact me with questions or concerns.    Ganga Hopkins MD PhD FAC  Cardiologist [facility]    Please note that this dictation was created using voice recognition software. I have worked with consultants from the vendor as well as technical experts from [facility] to optimize the interface. I have made every reasonable attempt to correct obvious errors, but I expect that there are errors of grammar and possibly content I did not discover before finalizing the note.

## 2020-04-04 ENCOUNTER — HOSPITAL ENCOUNTER (OUTPATIENT)
Dept: LAB | Facility: MEDICAL CENTER | Age: 65
End: 2020-04-04
Attending: INTERNAL MEDICINE
Payer: MEDICARE

## 2020-04-04 LAB
25(OH)D3 SERPL-MCNC: 35 NG/ML (ref 30–100)
ALBUMIN SERPL BCP-MCNC: 4.2 G/DL (ref 3.2–4.9)
ALBUMIN/GLOB SERPL: 1.6 G/DL
ALP SERPL-CCNC: 65 U/L (ref 30–99)
ALT SERPL-CCNC: 15 U/L (ref 2–50)
ANION GAP SERPL CALC-SCNC: 11 MMOL/L (ref 7–16)
AST SERPL-CCNC: 13 U/L (ref 12–45)
BASOPHILS # BLD AUTO: 0.5 % (ref 0–1.8)
BASOPHILS # BLD: 0.03 K/UL (ref 0–0.12)
BILIRUB SERPL-MCNC: 0.3 MG/DL (ref 0.1–1.5)
BUN SERPL-MCNC: 14 MG/DL (ref 8–22)
CALCIUM SERPL-MCNC: 9.1 MG/DL (ref 8.5–10.5)
CHLORIDE SERPL-SCNC: 103 MMOL/L (ref 96–112)
CHOLEST SERPL-MCNC: 144 MG/DL (ref 100–199)
CO2 SERPL-SCNC: 26 MMOL/L (ref 20–33)
CREAT SERPL-MCNC: 0.59 MG/DL (ref 0.5–1.4)
CREAT UR-MCNC: 158.51 MG/DL
EOSINOPHIL # BLD AUTO: 0.4 K/UL (ref 0–0.51)
EOSINOPHIL NFR BLD: 6.7 % (ref 0–6.9)
ERYTHROCYTE [DISTWIDTH] IN BLOOD BY AUTOMATED COUNT: 44.3 FL (ref 35.9–50)
EST. AVERAGE GLUCOSE BLD GHB EST-MCNC: 192 MG/DL
GLOBULIN SER CALC-MCNC: 2.7 G/DL (ref 1.9–3.5)
GLUCOSE SERPL-MCNC: 163 MG/DL (ref 65–99)
HBA1C MFR BLD: 8.3 % (ref 0–5.6)
HCT VFR BLD AUTO: 40 % (ref 37–47)
HDLC SERPL-MCNC: 51 MG/DL
HGB BLD-MCNC: 13.1 G/DL (ref 12–16)
IMM GRANULOCYTES # BLD AUTO: 0.01 K/UL (ref 0–0.11)
IMM GRANULOCYTES NFR BLD AUTO: 0.2 % (ref 0–0.9)
LDLC SERPL CALC-MCNC: 66 MG/DL
LYMPHOCYTES # BLD AUTO: 1.92 K/UL (ref 1–4.8)
LYMPHOCYTES NFR BLD: 32.2 % (ref 22–41)
MCH RBC QN AUTO: 30.8 PG (ref 27–33)
MCHC RBC AUTO-ENTMCNC: 32.8 G/DL (ref 33.6–35)
MCV RBC AUTO: 93.9 FL (ref 81.4–97.8)
MICROALBUMIN UR-MCNC: 4.4 MG/DL
MICROALBUMIN/CREAT UR: 28 MG/G (ref 0–30)
MONOCYTES # BLD AUTO: 0.46 K/UL (ref 0–0.85)
MONOCYTES NFR BLD AUTO: 7.7 % (ref 0–13.4)
NEUTROPHILS # BLD AUTO: 3.14 K/UL (ref 2–7.15)
NEUTROPHILS NFR BLD: 52.7 % (ref 44–72)
NRBC # BLD AUTO: 0 K/UL
NRBC BLD-RTO: 0 /100 WBC
PLATELET # BLD AUTO: 248 K/UL (ref 164–446)
PMV BLD AUTO: 10.5 FL (ref 9–12.9)
POTASSIUM SERPL-SCNC: 4.2 MMOL/L (ref 3.6–5.5)
PROT SERPL-MCNC: 6.9 G/DL (ref 6–8.2)
RBC # BLD AUTO: 4.26 M/UL (ref 4.2–5.4)
SODIUM SERPL-SCNC: 140 MMOL/L (ref 135–145)
TRIGL SERPL-MCNC: 137 MG/DL (ref 0–149)
TSH SERPL DL<=0.005 MIU/L-ACNC: 1.89 UIU/ML (ref 0.38–5.33)
WBC # BLD AUTO: 6 K/UL (ref 4.8–10.8)

## 2020-04-04 PROCEDURE — 82043 UR ALBUMIN QUANTITATIVE: CPT

## 2020-04-04 PROCEDURE — 83036 HEMOGLOBIN GLYCOSYLATED A1C: CPT

## 2020-04-04 PROCEDURE — 82570 ASSAY OF URINE CREATININE: CPT

## 2020-04-04 PROCEDURE — 80061 LIPID PANEL: CPT

## 2020-04-04 PROCEDURE — 36415 COLL VENOUS BLD VENIPUNCTURE: CPT

## 2020-04-04 PROCEDURE — 84443 ASSAY THYROID STIM HORMONE: CPT

## 2020-04-04 PROCEDURE — 82306 VITAMIN D 25 HYDROXY: CPT

## 2020-04-04 PROCEDURE — 85025 COMPLETE CBC W/AUTO DIFF WBC: CPT

## 2020-04-04 PROCEDURE — 80053 COMPREHEN METABOLIC PANEL: CPT

## 2020-07-06 ENCOUNTER — HOSPITAL ENCOUNTER (OUTPATIENT)
Dept: LAB | Facility: MEDICAL CENTER | Age: 65
End: 2020-07-06
Attending: INTERNAL MEDICINE
Payer: MEDICARE

## 2020-07-06 LAB
ANION GAP SERPL CALC-SCNC: 15 MMOL/L (ref 7–16)
BUN SERPL-MCNC: 14 MG/DL (ref 8–22)
CALCIUM SERPL-MCNC: 9.5 MG/DL (ref 8.5–10.5)
CHLORIDE SERPL-SCNC: 104 MMOL/L (ref 96–112)
CO2 SERPL-SCNC: 21 MMOL/L (ref 20–33)
CREAT SERPL-MCNC: 0.64 MG/DL (ref 0.5–1.4)
FASTING STATUS PATIENT QL REPORTED: NORMAL
GLUCOSE SERPL-MCNC: 171 MG/DL (ref 65–99)
POTASSIUM SERPL-SCNC: 4.2 MMOL/L (ref 3.6–5.5)
SODIUM SERPL-SCNC: 140 MMOL/L (ref 135–145)

## 2020-07-06 PROCEDURE — 80048 BASIC METABOLIC PNL TOTAL CA: CPT

## 2020-07-06 PROCEDURE — 36415 COLL VENOUS BLD VENIPUNCTURE: CPT

## 2020-07-08 ENCOUNTER — HOSPITAL ENCOUNTER (OUTPATIENT)
Dept: RADIOLOGY | Facility: MEDICAL CENTER | Age: 65
End: 2020-07-08
Attending: INTERNAL MEDICINE
Payer: MEDICARE

## 2020-07-08 DIAGNOSIS — R22.9 LOCALIZED SUPERFICIAL SWELLING, MASS, OR LUMP: ICD-10-CM

## 2020-07-08 DIAGNOSIS — R22.1 NECK MASS: ICD-10-CM

## 2020-07-08 PROCEDURE — 70491 CT SOFT TISSUE NECK W/DYE: CPT

## 2020-07-08 PROCEDURE — 700117 HCHG RX CONTRAST REV CODE 255: Performed by: INTERNAL MEDICINE

## 2020-07-08 RX ADMIN — IOHEXOL 80 ML: 350 INJECTION, SOLUTION INTRAVENOUS at 13:30

## 2020-07-16 ENCOUNTER — HOSPITAL ENCOUNTER (OUTPATIENT)
Dept: RADIOLOGY | Facility: MEDICAL CENTER | Age: 65
End: 2020-07-16
Attending: SURGERY
Payer: MEDICARE

## 2020-07-16 DIAGNOSIS — E04.1 NONTOXIC UNINODULAR GOITER: ICD-10-CM

## 2020-07-16 PROCEDURE — 76536 US EXAM OF HEAD AND NECK: CPT

## 2020-07-28 ENCOUNTER — OFFICE VISIT (OUTPATIENT)
Dept: CARDIOLOGY | Facility: MEDICAL CENTER | Age: 65
End: 2020-07-28
Payer: MEDICARE

## 2020-07-28 VITALS
HEIGHT: 66 IN | SYSTOLIC BLOOD PRESSURE: 118 MMHG | BODY MASS INDEX: 23.01 KG/M2 | DIASTOLIC BLOOD PRESSURE: 78 MMHG | HEART RATE: 74 BPM | OXYGEN SATURATION: 95 % | WEIGHT: 143.2 LBS

## 2020-07-28 DIAGNOSIS — E78.5 DYSLIPIDEMIA: ICD-10-CM

## 2020-07-28 DIAGNOSIS — I10 ESSENTIAL HYPERTENSION: Chronic | ICD-10-CM

## 2020-07-28 DIAGNOSIS — Z79.4 TYPE 2 DIABETES MELLITUS WITH COMPLICATION, WITH LONG-TERM CURRENT USE OF INSULIN (HCC): ICD-10-CM

## 2020-07-28 DIAGNOSIS — Z72.0 TOBACCO USE: ICD-10-CM

## 2020-07-28 DIAGNOSIS — E11.8 TYPE 2 DIABETES MELLITUS WITH COMPLICATION, WITH LONG-TERM CURRENT USE OF INSULIN (HCC): ICD-10-CM

## 2020-07-28 DIAGNOSIS — I25.10 CORONARY ARTERY DISEASE, NON-OCCLUSIVE: ICD-10-CM

## 2020-07-28 PROCEDURE — 99214 OFFICE O/P EST MOD 30 MIN: CPT | Performed by: INTERNAL MEDICINE

## 2020-07-28 ASSESSMENT — ENCOUNTER SYMPTOMS
MYALGIAS: 0
LOSS OF CONSCIOUSNESS: 0
PALPITATIONS: 0
COUGH: 0
DIZZINESS: 0
SHORTNESS OF BREATH: 0

## 2020-07-28 ASSESSMENT — FIBROSIS 4 INDEX: FIB4 SCORE: 0.85

## 2020-07-28 NOTE — PROGRESS NOTES
"Chief Complaint   Patient presents with   • Coronary Artery Disease   • HTN (Controlled)       Subjective:   Mary Moore is a 64 y.o. female who presents today for follow-up evaluation of chest discomfort, CAD, hypertension and hyperlipidemia.     Last seen on 9/27/2019 by Ganga Hopkins MD.    Since 9/27/2019 the patient has had no cardiac problems or symptoms.  She temporarily stop smoking with Chantix but is restarted.  Having some conflicts with her neighbors who are young people with a band that plays loud music at all hours.  Also admits to dietary indiscretion, \"I like fatty foods\".  Had previously been followed by an endocrinologist with [facility] but who has left.    Since 6/2/2017 the patient had no cardiac symptoms.  Compliant with medications.  Continues to smoke.     Since 11/14/2016 appointment the patient has had recent recurrent jaw and chest pain with without exertion.  Continues to smoke cigarettes.  States he has a lot of \"stress\" at home.     Has occasional nocturnal chest discomfort.  However has chronic insomnia.  Continues to smoke cigarettes.  Missed her scheduled appointment with the \"lung doctor\".  Has a rescheduled appointment on 12/5/2016.     Past medical history  Back in August, 2016 the past 3-4 weeks the patient reports to me symptoms of substernal toothache-like chest discomfort and jaw pain and headache.  The symptoms occur with or without activity.  She also feels episodes of lightheadedness without palpitations.  She doesn't feel comfortable about driving.  General he doesn't feel well.  Under a lot of stress for various reasons.     In 2009, seen by Dr. Demetrius Rosen evaluation of anginal like symptoms.  Cardiac catheterization showed minimal coronary artery disease and an EF of 75%.  Medical therapy and smoking cessation was recommended.     The patient continues to smoke cigarettes.  She has significant history of hypertension, diabetes mellitus and " hypercholesterolemia.    Past Medical History:   Diagnosis Date   • Anxiety 6/23/2016   • Chest pain 6/23/2016   • Constipation 6/23/2016   • Dyslipidemia 6/23/2016   • Epigastric pain 6/23/2016   • Facial rash 6/23/2016   • Fibromyalgia 6/23/2016   • GERD (gastroesophageal reflux disease) 6/23/2016   • HTN (hypertension) 6/23/2016   • Hypercalcemia 6/23/2016   • Hyperlipidemia    • Hypertension     bordeline    • Insomnia     chronic   • Lentigo 6/23/2016   • Lupus erythematosus 6/23/2016   • Multiple pulmonary nodules 6/23/2016   • Urinary incontinence 6/23/2016   • Vitamin D deficiency 6/23/2016     Past Surgical History:   Procedure Laterality Date   • RECOVERY  8/25/2016    Procedure: CATH LAB-C W/POSSIBLE-RITU;  Surgeon: Recoveryonly Surgery;  Location: SURGERY PRE-[facility] [place] [place]  Service:      Family History   Problem Relation Age of Onset   • Heart Disease Father    • Stroke Father      Social History     Socioeconomic History   • Marital status:      Spouse name: Not on file   • Number of children: Not on file   • Years of education: Not on file   • Highest education level: Not on file   Occupational History   • Not on file   Social Needs   • Financial resource strain: Not on file   • Food insecurity     Worry: Not on file     Inability: Not on file   • Transportation needs     Medical: Not on file     Non-medical: Not on file   Tobacco Use   • Smoking status: Current Some Day Smoker     Packs/day: 0.25     Years: 40.00     Pack years: 10.00     Types: Cigarettes     Last attempt to quit: 4/1/2017     Years since quitting: 3.3   • Smokeless tobacco: Never Used   • Tobacco comment: Infrequest ciggarette smoking.    Substance and Sexual Activity   • Alcohol use: No     Alcohol/week: 0.0 oz   • Drug use: No   • Sexual activity: Not Currently     Partners: Male   Lifestyle   • Physical activity     Days per week: Not on file     Minutes per session: Not on file   • Stress: Not on file    Relationships   • Social connections     Talks on phone: Not on file     Gets together: Not on file     Attends [employer] service: Not on file     Active member of club or organization: Not on file     Attends meetings of clubs or organizations: Not on file     Relationship status: Not on file   • Intimate partner violence     Fear of current or ex partner: Not on file     Emotionally abused: Not on file     Physically abused: Not on file     Forced sexual activity: Not on file   Other Topics Concern   • Not on file   Social History Narrative   • Not on file     No Known Allergies  Outpatient Encounter Medications as of 7/28/2020   Medication Sig Dispense Refill   • varenicline (CHANTIX CONTINUING MONTH PAK) 1 MG tablet Take 1 Tab by mouth 2 times a day. 60 Tab 3   • atorvastatin (LIPITOR) 40 MG Tab Take 1 Tab by mouth every day. 90 Tab 3   • metoprolol (LOPRESSOR) 25 MG Tab Take 0.5 Tabs by mouth 2 times a day. 90 Tab 3   • lisinopril (PRINIVIL, ZESTRIL) 40 MG tablet Take 1 Tab by mouth every day. 90 Tab 3   • metformin (GLUCOPHAGE) 1000 MG tablet TAKE 1 TABLET BY MOUTH TWICE A  Tab 0   • Insulin Glargine (BASAGLAR KWIKPEN) 100 UNIT/ML Solution Pen-injector Inject 46 Units as instructed every bedtime. 15 mL 0   • TRUE METRIX BLOOD GLUCOSE TEST strip USE TO TEST TWICE DAILY  6   • ANORO ELLIPTA 62.5-25 MCG/INH AEROSOL POWDER, BREATH ACTIVATED inhaler 1 Puff every day.  11   • Cholecalciferol (VITAMIN D3) 2000 UNIT Cap Take 1 Cap by mouth every day. 90 Cap 3   • aspirin EC (ECOTRIN) 81 MG Tablet Delayed Response Take 81 mg by mouth every day.     • varenicline (CHANTIX STARTING MONTH ABEBE) 0.5 MG X 11 & 1 MG X 42 tablet Use as directed 56 Tab 3     No facility-administered encounter medications on file as of 7/28/2020.      Review of Systems   Respiratory: Negative for cough and shortness of breath.    Cardiovascular: Negative for chest pain and palpitations.   Musculoskeletal: Negative for myalgias.  "  Neurological: Negative for dizziness and loss of consciousness.        Objective:   /78 (BP Location: Left arm, Patient Position: Sitting, BP Cuff Size: Adult)   Pulse 74   Ht 1.676 m (5' 6\")   Wt 65 kg (143 lb 3.2 oz)   SpO2 95%   BMI 23.11 kg/m²     Physical Exam   Constitutional: She is oriented to person, place, and time. She appears well-developed and well-nourished. No distress.   Eyes: Pupils are equal, round, and reactive to light. Conjunctivae and EOM are normal.   Neck: Normal range of motion. Neck supple. No JVD present.   Cardiovascular: Normal rate, regular rhythm, normal heart sounds and intact distal pulses. Exam reveals no gallop and no friction rub.   No murmur heard.  Pulses:       Carotid pulses are 2+ on the right side and 2+ on the left side.  Pulmonary/Chest: Effort normal and breath sounds normal. No accessory muscle usage. No respiratory distress. She has no wheezes. She has no rales.   Abdominal: Soft. She exhibits no distension and no mass. There is no hepatosplenomegaly. There is no abdominal tenderness.   Musculoskeletal:         General: No edema.   Neurological: She is alert and oriented to person, place, and time.   Skin: Skin is warm, dry and intact. No rash noted. No cyanosis. Nails show no clubbing.   Psychiatric: She has a normal mood and affect. Her behavior is normal.   Vitals reviewed.    08/08/2016 EKG: Normal sinus rhythm, rate 63. Reviewed by myself.     11/16/2009 ECHOCARDIOGRAM  EF 65%.  No valvular disease.  Pulmonary artery pressure 25-30 mmHg.     11/17/2009 CARDIAC CATHETERIZATION  EF 75%.  Minimal coronary artery disease.  Medical therapy and smoking cessation recommended.     CARDIAC CATHETERIZATION 08/25/2016  A.  Left heart catheterization.  B.  Left ventriculography.  C.  Selective coronary artery.  D.  Right radial artery approach.  1.  EF 64%.  Normal wall motion.  2.  Proximal LAD at 20% ostial.  3.  Ostial D1 50%-60%.    06/13/2017 [facility]   No " reversible defects that would indicate ischemia.    Normal left ventricular size, ejection fraction, and wall motion.    ECG INTERPRETATION   Negative stress ECG for ischemia.    Assessment:     1. Coronary artery disease, non-occlusive     2. Essential hypertension     3. Dyslipidemia     4. Tobacco use     5. Type 2 diabetes mellitus with complication, with long-term current use of insulin (HCC)  REFERRAL TO DIABETIC EDUCATION Diabetes Self Management Education / Training (DSME/T) and Medical Nutrition Therapy (MNT): Initial Group DSME/MNT as authorized by payor, Follow-Up DSME/MNT as authorized by payor; DSME/T Content: Monitoring Diabete...    REFERRAL TO ENDOCRINOLOGY       Medical Decision Making:  Today's Assessment / Status / Plan:     Assessment     1.  CAD.     2.  Hypertension.       3.  Hyperlipidemia.  On atorvastatin.     4.  Diabetes mellitus.     5.  Chronic tobacco use.     6.  Insomnia/sleep disorder.     7.  Adult situational stress syndrome.     Recommendations/Discussion:  1.  The patient's current cardiac status is stable with regards to her coronary artery disease, hypertension and dyslipidemia..  2.  Recent blood work 4/2020 shows LDL at goal at 66 but A1c increased to 8.3% from 6.9%.  3.  Counseled to stop smoking, restart Chantix given to her by her PCP.  4.  Referred to diabetic education and [facility] Endocrinologist   5.  RTC 6 months.

## 2020-08-07 ENCOUNTER — HOSPITAL ENCOUNTER (OUTPATIENT)
Dept: LAB | Facility: MEDICAL CENTER | Age: 65
End: 2020-08-07
Attending: SURGERY
Payer: MEDICARE

## 2020-08-07 LAB — PATHOLOGY CONSULT NOTE: NORMAL

## 2020-08-07 PROCEDURE — 88304 TISSUE EXAM BY PATHOLOGIST: CPT

## 2020-08-19 ENCOUNTER — HOSPITAL ENCOUNTER (OUTPATIENT)
Dept: RADIOLOGY | Facility: MEDICAL CENTER | Age: 65
End: 2020-08-19
Attending: INTERNAL MEDICINE
Payer: MEDICARE

## 2020-08-19 DIAGNOSIS — R91.8 PULMONARY NODULES: ICD-10-CM

## 2020-08-19 PROCEDURE — 71250 CT THORAX DX C-: CPT

## 2020-09-22 DIAGNOSIS — I25.10 CORONARY ARTERY DISEASE, NON-OCCLUSIVE: ICD-10-CM

## 2021-01-15 ENCOUNTER — HOSPITAL ENCOUNTER (OUTPATIENT)
Dept: LAB | Facility: MEDICAL CENTER | Age: 66
End: 2021-01-15
Attending: INTERNAL MEDICINE
Payer: COMMERCIAL

## 2021-01-15 PROCEDURE — 80053 COMPREHEN METABOLIC PANEL: CPT

## 2021-01-15 PROCEDURE — 83036 HEMOGLOBIN GLYCOSYLATED A1C: CPT | Mod: GA

## 2021-01-15 PROCEDURE — 36415 COLL VENOUS BLD VENIPUNCTURE: CPT

## 2021-01-16 LAB
ALBUMIN SERPL BCP-MCNC: 4.3 G/DL (ref 3.2–4.9)
ALBUMIN/GLOB SERPL: 1.7 G/DL
ALP SERPL-CCNC: 64 U/L (ref 30–99)
ALT SERPL-CCNC: 20 U/L (ref 2–50)
ANION GAP SERPL CALC-SCNC: 12 MMOL/L (ref 7–16)
AST SERPL-CCNC: 24 U/L (ref 12–45)
BILIRUB SERPL-MCNC: 0.3 MG/DL (ref 0.1–1.5)
BUN SERPL-MCNC: 17 MG/DL (ref 8–22)
CALCIUM SERPL-MCNC: 9.3 MG/DL (ref 8.5–10.5)
CHLORIDE SERPL-SCNC: 102 MMOL/L (ref 96–112)
CO2 SERPL-SCNC: 23 MMOL/L (ref 20–33)
CREAT SERPL-MCNC: 0.68 MG/DL (ref 0.5–1.4)
GLOBULIN SER CALC-MCNC: 2.5 G/DL (ref 1.9–3.5)
GLUCOSE SERPL-MCNC: 61 MG/DL (ref 65–99)
POTASSIUM SERPL-SCNC: 4.2 MMOL/L (ref 3.6–5.5)
PROT SERPL-MCNC: 6.8 G/DL (ref 6–8.2)
SODIUM SERPL-SCNC: 137 MMOL/L (ref 135–145)

## 2021-01-17 LAB
EST. AVERAGE GLUCOSE BLD GHB EST-MCNC: 140 MG/DL
HBA1C MFR BLD: 6.5 % (ref 0–5.6)

## 2021-03-30 ENCOUNTER — TELEPHONE (OUTPATIENT)
Dept: CARDIOLOGY | Facility: MEDICAL CENTER | Age: 66
End: 2021-03-30

## 2021-03-30 DIAGNOSIS — I10 ESSENTIAL HYPERTENSION: Chronic | ICD-10-CM

## 2021-03-30 NOTE — TELEPHONE ENCOUNTER
CW    Pt called regarding Rx metoprolol (LOPRESSOR) 25 MG Tab. Pt states that she has 5 days left of this Rx and also states that this was filled last by her PCP for some reason and would like Dr CERVANTES or LOCO to fill. Pt uses [facility] on [address] Please call pt back if you have any further questions at [phone].    Thank you

## 2021-03-31 NOTE — TELEPHONE ENCOUNTER
Filled 90tabs with 3 refills 9/23/2020    LVM with patient and advised she should have refills left.

## 2021-03-31 NOTE — TELEPHONE ENCOUNTER
Upon chart review, pt last seen by BILLY 7/2020.    Refill request relayed to BILLY's covering RN.

## 2021-04-01 RX ORDER — ATORVASTATIN CALCIUM 40 MG/1
TABLET, FILM COATED ORAL
Qty: 90 TABLET | Refills: 3 | Status: SHIPPED | OUTPATIENT
Start: 2021-04-01 | End: 2022-01-21

## 2021-04-01 RX ORDER — LISINOPRIL 40 MG/1
TABLET ORAL
Qty: 90 TABLET | Refills: 3 | Status: SHIPPED | OUTPATIENT
Start: 2021-04-01 | End: 2022-01-21

## 2021-10-12 RX ORDER — FLUOXETINE 20 MG/1
TABLET, FILM COATED ORAL
COMMUNITY
Start: 2020-12-16 | End: 2022-01-14 | Stop reason: SDUPTHER

## 2021-10-12 NOTE — TELEPHONE ENCOUNTER
Last seen: 05/06/21 by Dr. Barroso  Next appt: None     Was the patient seen in the last year in this department? Yes   Does patient have an active prescription for medications requested? No   Received Request Via: Pharmacy

## 2021-11-03 DIAGNOSIS — I25.10 CORONARY ARTERY DISEASE, NON-OCCLUSIVE: ICD-10-CM

## 2021-11-03 NOTE — TELEPHONE ENCOUNTER
Last seen: 5/6/21 by Dr. Barroso   Next appt: None  Was the patient seen in the last year in this department? Yes   Does patient have an active prescription for medications requested? No   Received Request Via: Pharmacy

## 2021-12-08 ENCOUNTER — TELEPHONE (OUTPATIENT)
Dept: HEALTH INFORMATION MANAGEMENT | Facility: OTHER | Age: 66
End: 2021-12-08

## 2022-01-13 NOTE — TELEPHONE ENCOUNTER
Metformin Refill    Last seen: 5/6/21 by Dr. Barroso  Next appt: ? with  ?    Was the patient seen in the last year in this department? Yes   Does patient have an active prescription for medications requested? No   Received Request Via: Pharmacy

## 2022-01-14 ENCOUNTER — OFFICE VISIT (OUTPATIENT)
Dept: INTERNAL MEDICINE | Facility: OTHER | Age: 67
End: 2022-01-14
Payer: MEDICARE

## 2022-01-14 VITALS
WEIGHT: 121.4 LBS | DIASTOLIC BLOOD PRESSURE: 84 MMHG | HEART RATE: 69 BPM | TEMPERATURE: 97.2 F | BODY MASS INDEX: 19.51 KG/M2 | HEIGHT: 66 IN | OXYGEN SATURATION: 98 % | SYSTOLIC BLOOD PRESSURE: 122 MMHG

## 2022-01-14 DIAGNOSIS — R63.4 WEIGHT LOSS: ICD-10-CM

## 2022-01-14 DIAGNOSIS — F32.0 CURRENT MILD EPISODE OF MAJOR DEPRESSIVE DISORDER WITHOUT PRIOR EPISODE (HCC): ICD-10-CM

## 2022-01-14 DIAGNOSIS — E11.65 CONTROLLED TYPE 2 DIABETES MELLITUS WITH HYPERGLYCEMIA, WITHOUT LONG-TERM CURRENT USE OF INSULIN (HCC): ICD-10-CM

## 2022-01-14 DIAGNOSIS — I10 ESSENTIAL HYPERTENSION: ICD-10-CM

## 2022-01-14 PROCEDURE — 99214 OFFICE O/P EST MOD 30 MIN: CPT | Mod: GC | Performed by: STUDENT IN AN ORGANIZED HEALTH CARE EDUCATION/TRAINING PROGRAM

## 2022-01-14 RX ORDER — FLUOXETINE 10 MG/1
CAPSULE ORAL
COMMUNITY
End: 2022-01-14

## 2022-01-14 RX ORDER — TRAZODONE HYDROCHLORIDE 100 MG/1
TABLET ORAL
COMMUNITY
End: 2022-03-11

## 2022-01-14 RX ORDER — NEEDLES, DISPOSABLE 25GX5/8"
NEEDLE, DISPOSABLE MISCELLANEOUS
COMMUNITY
End: 2022-03-11

## 2022-01-14 RX ORDER — FLUOXETINE 20 MG/1
20 TABLET, FILM COATED ORAL DAILY
Qty: 30 TABLET | Refills: 0 | Status: SHIPPED | OUTPATIENT
Start: 2022-01-14 | End: 2022-01-14 | Stop reason: CLARIF

## 2022-01-14 RX ORDER — MIRTAZAPINE 15 MG/1
15 TABLET, FILM COATED ORAL
Qty: 30 TABLET | Refills: 1 | Status: SHIPPED | OUTPATIENT
Start: 2022-01-14 | End: 2022-08-26 | Stop reason: SDUPTHER

## 2022-01-14 ASSESSMENT — PATIENT HEALTH QUESTIONNAIRE - PHQ9: CLINICAL INTERPRETATION OF PHQ2 SCORE: 0

## 2022-01-14 ASSESSMENT — FIBROSIS 4 INDEX: FIB4 SCORE: 1.41

## 2022-01-14 NOTE — PROGRESS NOTES
"    HPI: 66-year-old lady with a past medical history of hypertension, diabetes, dyslipidemia, coronary artery disease, GERD is here for routine follow-up.-Patient states that she has been losing weight over the last few years almost 20 pounds since 2019.  She states her appetite is okay but continues to lose weight.  Denies any chest pain, shortness of breath, cough, abdominal pain, nausea vomiting.   She said she had a colonoscopy 1 year ago which was normal.  -Patient is a chronic smoker, unable to cut down, previously on Chantix.  Offered Chantix again this visit.  Per patient will want to discuss it only during her follow-up.  -Patient also states that her mood is been low given her financial conditions.  She would like to see a therapist.  She has been off her Prozac for a bit.  At this point  -You have systems otherwise negative        Current medicines (including changes today)  Current Outpatient Medications   Medication Sig Dispense Refill   • mirtazapine (REMERON) 15 MG Tab Take 1 Tablet by mouth at bedtime. 30 Tablet 1   • metoprolol tartrate (LOPRESSOR) 25 MG Tab Take 0.5 Tablets by mouth 2 times a day. 90 Tablet 0   • atorvastatin (LIPITOR) 40 MG Tab TAKE 1 TABLET BY MOUTH EVERY DAY 90 tablet 3   • lisinopril (PRINIVIL) 40 MG tablet TAKE 1 TABLET BY MOUTH EVERY DAY 90 tablet 3   • metformin (GLUCOPHAGE) 1000 MG tablet TAKE 1 TABLET BY MOUTH TWICE A  Tab 0   • Cholecalciferol (VITAMIN D3) 2000 UNIT Cap Take 1 Cap by mouth every day. 90 Cap 3   • aspirin EC (ECOTRIN) 81 MG Tablet Delayed Response Take 81 mg by mouth every day.     • NEEDLE, DISP, 25 G (BD DISP NEEDLES) 25G X 5/8\" Misc  (Patient not taking: Reported on 1/14/2022)     • Blood Glucose Meter Kit  (Patient not taking: Reported on 1/14/2022)     • traZODone (DESYREL) 100 MG Tab 1 tab(s) (Patient not taking: Reported on 1/14/2022)     • metformin (GLUCOPHAGE) 1000 MG tablet TAKE 1 TABLET BY MOUTH TWICE A DAY 60 Tablet 1   • metformin " (GLUCOPHAGE) 1000 MG tablet Take 1 Tablet by mouth 2 times a day with meals. 180 Tablet 1   • varenicline (CHANTIX STARTING MONTH ABEBE) 0.5 MG X 11 & 1 MG X 42 tablet Use as directed (Patient not taking: Reported on 2022) 56 Tab 3   • varenicline (CHANTIX CONTINUING MONTH ) 1 MG tablet Take 1 Tab by mouth 2 times a day. (Patient not taking: Reported on 2022) 60 Tab 3   • Insulin Glargine (BASAGLAR KWIKPEN) 100 UNIT/ML Solution Pen-injector Inject 46 Units as instructed every bedtime. (Patient not taking: Reported on 2022) 15 mL 0   • TRUE METRIX BLOOD GLUCOSE TEST strip USE TO TEST TWICE DAILY (Patient not taking: Reported on 2022)  6   • ANORO ELLIPTA 62.5-25 MCG/INH AEROSOL POWDER, BREATH ACTIVATED inhaler 1 Puff every day. (Patient not taking: Reported on 2022)  11     No current facility-administered medications for this visit.     She  has a past medical history of Anxiety (2016), Chest pain (2016), Constipation (2016), Dyslipidemia (2016), Epigastric pain (2016), Facial rash (2016), Fibromyalgia (2016), GERD (gastroesophageal reflux disease) (2016), HTN (hypertension) (2016), Hypercalcemia (2016), Hyperlipidemia, Hypertension, Insomnia, Lentigo (2016), Lupus erythematosus (2016), Multiple pulmonary nodules (2016), Urinary incontinence (2016), and Vitamin D deficiency (2016). She also has no past medical history of Breast cancer (HCC).  She  has a past surgical history that includes recovery (2016).  Social History     Tobacco Use   • Smoking status: Current Some Day Smoker     Packs/day: 0.25     Years: 40.00     Pack years: 10.00     Types: Cigarettes     Last attempt to quit: 2017     Years since quittin.7   • Smokeless tobacco: Never Used   • Tobacco comment: Infrequest ciggarette smoking.    Substance Use Topics   • Alcohol use: No     Alcohol/week: 0.0 oz   • Drug use: No     Social History  "    Social History Narrative   • Not on file     Family History   Problem Relation Age of Onset   • Heart Disease Father    • Stroke Father      Family Status   Relation Name Status   • Mo  Alive   • Fa     • Sis 3 Alive   • Bro 1 Alive       ROS  See HPI     Objective:     Physical Exam:  /84 (BP Location: Right arm, Patient Position: Sitting, BP Cuff Size: Adult)   Pulse 69   Temp 36.2 °C (97.2 °F) (Temporal)   Ht 1.676 m (5' 6\")   Wt 55.1 kg (121 lb 6.4 oz)   SpO2 98%  Body mass index is 19.59 kg/m².  Constitutional: Alert. Well appearing. No distress.  Skin: Warm, dry, good turgor, no visible rashes.  Eye: Equal, round and reactive to light, conjunctiva clear, lids normal.  ENMT: Moist mucous membranes. Normal dentition. Oropharynx clear.  Neck: Trachea midline, no masses, no thyromegaly. No cervical or supraclavicular lymphadenopathy.  Respiratory: Normal effort. Lungs are clear to auscultation bilaterally.  Cardiovascular: Regular rate and rhythm. Normal S1/S2. No murmurs, rubs or gallops.   Abdomen: Soft, non-tender, non-distended. No masses, no hepatosplenomegaly.  Neuro: Moves all four extremities. No facial droop.  Psych: Answers questions appropriately. Normal affect and mood.      Assessment and Plan:     1. Weight loss  -20 pound weight loss in about 2 years, patient states her appetite is good.  -Will work-up for malignancy.  She has risk factors such as chronic smoking.  We will also check lab work including TSH  -Follow-up in 10 weeks  - CT-CHEST (THORAX) W/O; Future  - CT-ABDOMEN-PELVIS W/O; Future  - [id number]; Future  - CBC WITHOUT DIFFERENTIAL; Future  - Comp Metabolic Panel; Future  - TSH WITH REFLEX TO FT4; Future    2. Essential hypertension  Pressure stable 122/84  -Check labs and refill medications  - CBC WITHOUT DIFFERENTIAL; Future  - Comp Metabolic Panel; Future  - TSH WITH REFLEX TO FT4; Future    3. Current mild episode of major depressive disorder " without prior episode (HCC)  No SI or HI, secondary to financial conditions.  -Switch fluoxetine to mirtazapine given weight loss  - Referral to Psychology    4. Controlled type 2 diabetes mellitus with hyperglycemia, without long-term current use of insulin (HCC)  Only on metformin, last A1c 1 year ago was 6.5.  Patient denies any complaints, reports weight loss.  - HEMOGLOBIN A1C; Future  -A1c remains stable we can consider discontinuing metformin as it might have contributed to weight loss.  -Follow-up in 10 weeks        Follow up: Return in about 10 weeks (around 3/25/2022).         PLEASE NOTE: This note was created using voice recognition software.

## 2022-01-20 DIAGNOSIS — I10 ESSENTIAL HYPERTENSION: Chronic | ICD-10-CM

## 2022-01-21 ENCOUNTER — TELEPHONE (OUTPATIENT)
Dept: CARDIOLOGY | Facility: MEDICAL CENTER | Age: 67
End: 2022-01-21

## 2022-01-21 RX ORDER — ATORVASTATIN CALCIUM 40 MG/1
TABLET, FILM COATED ORAL
Qty: 30 TABLET | Refills: 0 | Status: SHIPPED | OUTPATIENT
Start: 2022-01-21 | End: 2022-03-11

## 2022-01-21 RX ORDER — LISINOPRIL 40 MG/1
TABLET ORAL
Qty: 30 TABLET | Refills: 0 | Status: SHIPPED | OUTPATIENT
Start: 2022-01-21 | End: 2022-02-11

## 2022-01-21 NOTE — TELEPHONE ENCOUNTER
----- Message from Palma Jimenez, Med Ass't sent at 1/21/2022  8:45 AM PST -----  Regarding: Need Follow Appointment  Please contact patient to schedule follow up appointment. Thank you

## 2022-01-24 DIAGNOSIS — I10 ESSENTIAL HYPERTENSION: Chronic | ICD-10-CM

## 2022-01-25 RX ORDER — LISINOPRIL 40 MG/1
40 TABLET ORAL
Qty: 100 TABLET | Refills: 0 | OUTPATIENT
Start: 2022-01-25

## 2022-01-31 ENCOUNTER — TELEPHONE (OUTPATIENT)
Dept: INTERNAL MEDICINE | Facility: OTHER | Age: 67
End: 2022-01-31

## 2022-02-02 DIAGNOSIS — I25.10 CORONARY ARTERY DISEASE, NON-OCCLUSIVE: ICD-10-CM

## 2022-02-02 DIAGNOSIS — Z79.899 MEDICATION MANAGEMENT: ICD-10-CM

## 2022-02-03 NOTE — TELEPHONE ENCOUNTER
Last seen: 01/14/22 by Dr. Barroso  Next appt: 03/24/22 with Dr. Barroso    Was the patient seen in the last year in this department? Yes   Does patient have an active prescription for medications requested? No   Received Request Via: Patient

## 2022-02-07 ENCOUNTER — TELEPHONE (OUTPATIENT)
Dept: CARDIOLOGY | Facility: MEDICAL CENTER | Age: 67
End: 2022-02-07

## 2022-02-07 DIAGNOSIS — Z79.899 MEDICATION MANAGEMENT: ICD-10-CM

## 2022-02-07 DIAGNOSIS — I25.10 CORONARY ARTERY DISEASE, NON-OCCLUSIVE: ICD-10-CM

## 2022-02-07 NOTE — TELEPHONE ENCOUNTER
Metformin Refill    Last seen: 1/14/22 by Dr. Barroso  Next appt: 3/24/22 with Dr. Barroso    Was the patient seen in the last year in this department? Yes   Does patient have an active prescription for medications requested? No   Received Request Via: Pharmacy

## 2022-02-07 NOTE — TELEPHONE ENCOUNTER
BILLY    Received request via: Patient    Was the patient seen in the last year in this department? No     Does the patient have an active prescription (recently filled or refills available) for medication(s) requested? No     Patient states that she will run out of her METOPROLOL tomorrow. Please advise.

## 2022-02-07 NOTE — TELEPHONE ENCOUNTER
BILLY Smith,    This patient was calling to get a refill on her script for metoprolol tartrate (LOPRESSOR) 25 MG Tab. Can you please call her back at [phone] once sent over to pharmacy. Patient only has 2 tablets left.    [facility]/pharmacy #[id number] - [place], [place] - [address]   [address], [place] [address]   Phone:  [phone]  Fax:  [phone]   [facility] #:  [id number]          Thank you,    LINH

## 2022-02-07 NOTE — TELEPHONE ENCOUNTER
Called patient home number and spoke to the patient. Advised she needs to contact her PCP office since she filled it last and is now managing the prescription, we have not seen since 07/2020. She stated she would, I was going to offer to discuss with SW but she interrupted me and I was unable to finish. I then stated I recommend she keep her FV with SW and she hung up on me.

## 2022-02-07 NOTE — TELEPHONE ENCOUNTER
Called patient home number and LM for the patient to call PCP or callback with further questions.

## 2022-02-15 ENCOUNTER — TELEPHONE (OUTPATIENT)
Dept: INTERNAL MEDICINE | Facility: OTHER | Age: 67
End: 2022-02-15

## 2022-02-15 RX ORDER — MIRTAZAPINE 15 MG/1
15 TABLET, FILM COATED ORAL NIGHTLY
Qty: 90 TABLET | Refills: 0 | Status: SHIPPED | OUTPATIENT
Start: 2022-02-15 | End: 2022-03-11

## 2022-03-10 DIAGNOSIS — I10 ESSENTIAL HYPERTENSION: Chronic | ICD-10-CM

## 2022-03-11 ENCOUNTER — OFFICE VISIT (OUTPATIENT)
Dept: CARDIOLOGY | Facility: MEDICAL CENTER | Age: 67
End: 2022-03-11
Payer: MEDICARE

## 2022-03-11 VITALS
SYSTOLIC BLOOD PRESSURE: 122 MMHG | RESPIRATION RATE: 14 BRPM | DIASTOLIC BLOOD PRESSURE: 74 MMHG | BODY MASS INDEX: 18.55 KG/M2 | HEART RATE: 64 BPM | OXYGEN SATURATION: 100 % | WEIGHT: 122.4 LBS | HEIGHT: 68 IN

## 2022-03-11 DIAGNOSIS — I25.10 CORONARY ARTERY DISEASE, NON-OCCLUSIVE: ICD-10-CM

## 2022-03-11 DIAGNOSIS — E78.5 DYSLIPIDEMIA: ICD-10-CM

## 2022-03-11 DIAGNOSIS — I10 ESSENTIAL HYPERTENSION, BENIGN: ICD-10-CM

## 2022-03-11 DIAGNOSIS — Z72.0 TOBACCO USE: ICD-10-CM

## 2022-03-11 PROCEDURE — 99214 OFFICE O/P EST MOD 30 MIN: CPT | Performed by: INTERNAL MEDICINE

## 2022-03-11 RX ORDER — ATORVASTATIN CALCIUM 40 MG/1
TABLET, FILM COATED ORAL
Qty: 100 TABLET | Refills: 3 | Status: SHIPPED | OUTPATIENT
Start: 2022-03-11 | End: 2023-03-02 | Stop reason: SDUPTHER

## 2022-03-11 ASSESSMENT — ENCOUNTER SYMPTOMS
DIZZINESS: 0
LOSS OF CONSCIOUSNESS: 0
MYALGIAS: 0
SHORTNESS OF BREATH: 0
COUGH: 0
PALPITATIONS: 0

## 2022-03-11 ASSESSMENT — FIBROSIS 4 INDEX: FIB4 SCORE: 1.41

## 2022-03-11 NOTE — PROGRESS NOTES
"Chief Complaint   Patient presents with   • Coronary Artery Disease     F/V Dx: Coronary artery disease, non-occlusive         Subjective     Mary Rodriguez is a 66 y.o. female who presents today for follow-up evaluation of CAD, hypertension and hyperlipidemia.     Since 7/28/2020 appointment the patient has had no cardiac symptoms including chest pain, palpitations, ALMONTE.  She is concerned about weight loss and is seeing her PCP at [facility] clinic and has thyroid test pending.  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  Continues to smoke on and off due to \"stress\" intermittently using Chantix.     In 2009, saw by Dr. Demetrius Rosen evaluation of anginal like symptoms.  Cardiac catheterization showed minimal coronary artery disease and an EF of 75%.  Medical therapy and smoking cessation was recommended.     The patient continues to smoke cigarettes.  She has significant history of hypertension, diabetes mellitus and hypercholesterolemia.    Past Medical History:   Diagnosis Date   • Anxiety 6/23/2016   • Chest pain 6/23/2016   • Constipation 6/23/2016   • Dyslipidemia 6/23/2016   • Epigastric pain 6/23/2016   • Facial rash 6/23/2016   • Fibromyalgia 6/23/2016   • GERD (gastroesophageal reflux disease) 6/23/2016   • HTN (hypertension) 6/23/2016   • Hypercalcemia 6/23/2016   • Hyperlipidemia    • Hypertension     bordeline    • Insomnia     chronic   • Lentigo 6/23/2016   • Lupus erythematosus 6/23/2016   • Multiple pulmonary nodules 6/23/2016   • Urinary incontinence 6/23/2016   • Vitamin D deficiency 6/23/2016     Past Surgical History:   Procedure Laterality Date   • RECOVERY  8/25/2016    Procedure: CATH LAB-[facility] W/POSSIBLE-RITU;  Surgeon: Recoveryonly Surgery;  [place] [facility] [place]:      Family History   Problem Relation Age of Onset   • Heart Disease Father  "   • Stroke Father      Social History     Socioeconomic History   • Marital status:      Spouse name: Not on file   • Number of children: Not on file   • Years of education: Not on file   • Highest education level: Not on file   Occupational History   • Not on file   Tobacco Use   • Smoking status: Current Some Day Smoker     Packs/day: 0.25     Years: 40.00     Pack years: 10.00     Types: Cigarettes     Last attempt to quit: 2017     Years since quittin.9   • Smokeless tobacco: Never Used   • Tobacco comment: Infrequest ciggarette smoking.    Substance and Sexual Activity   • Alcohol use: No     Alcohol/week: 0.0 oz   • Drug use: No   • Sexual activity: Not Currently     Partners: Male   Other Topics Concern   • Not on file   Social History Narrative   • Not on file     Social Determinants of Health     Financial Resource Strain: Not on file   Food Insecurity: Not on file   Transportation Needs: Not on file   Physical Activity: Not on file   Stress: Not on file   Social Connections: Not on file   Intimate Partner Violence: Not on file   Housing Stability: Not on file     No Known Allergies  Outpatient Encounter Medications as of 3/11/2022   Medication Sig Dispense Refill   • lisinopril (PRINIVIL) 40 MG tablet TAKE 1 TABLET BY MOUTH EVERY DAY (Patient taking differently: Take 40 mg by mouth every day.) 90 Tablet 0   • metformin (GLUCOPHAGE) 1000 MG tablet TAKE 1 TABLET BY MOUTH TWICE A DAY (Patient taking differently: Take 1,000 mg by mouth 2 times a day. TAKE 1 TABLET BY MOUTH TWICE A DAY) 180 Tablet 0   • metoprolol tartrate (LOPRESSOR) 25 MG Tab Take 0.5 Tablets by mouth 2 times a day. 90 Tablet 0   • atorvastatin (LIPITOR) 40 MG Tab TAKE 1 TABLET BY MOUTH EVERY DAY (Patient taking differently: Take 40 mg by mouth every day.) 30 Tablet 0   • mirtazapine (REMERON) 15 MG Tab Take 1 Tablet by mouth at bedtime. 30 Tablet 1   • varenicline (CHANTIX CONTINUING MONTH ABEBE) 1 MG tablet Take 1 Tab by mouth  "2 times a day. (Patient taking differently: Take 1 mg by mouth as needed.) 60 Tab 3   • Cholecalciferol (VITAMIN D3) 2000 UNIT Cap Take 1 Cap by mouth every day. 90 Cap 3   • aspirin EC (ECOTRIN) 81 MG Tablet Delayed Response Take 81 mg by mouth every day.     • [DISCONTINUED] mirtazapine (REMERON) 15 MG Tab Take 1 Tablet by mouth every evening. 90 Tablet 0   • [DISCONTINUED] NEEDLE, DISP, 25 G (BD DISP NEEDLES) 25G X 5/8\" Misc  (Patient not taking: No sig reported)     • [DISCONTINUED] Blood Glucose Meter Kit  (Patient not taking: No sig reported)     • [DISCONTINUED] traZODone (DESYREL) 100 MG Tab 1 tab(s)     • [DISCONTINUED] varenicline (CHANTIX STARTING MONTH PAK) 0.5 MG X 11 & 1 MG X 42 tablet Use as directed (Patient not taking: No sig reported) 56 Tab 3   • [DISCONTINUED] Insulin Glargine (BASAGLAR KWIKPEN) 100 UNIT/ML Solution Pen-injector Inject 46 Units as instructed every bedtime. (Patient not taking: No sig reported) 15 mL 0   • [DISCONTINUED] TRUE METRIX BLOOD GLUCOSE TEST strip USE TO TEST TWICE DAILY  6     No facility-administered encounter medications on file as of 3/11/2022.     Review of Systems   Respiratory: Negative for cough and shortness of breath.    Cardiovascular: Negative for chest pain and palpitations.   Musculoskeletal: Negative for myalgias.   Neurological: Negative for dizziness and loss of consciousness.              Objective     /74 (BP Location: Left arm, Patient Position: Sitting, BP Cuff Size: Adult)   Pulse 64   Resp 14   Ht 1.727 m (5' 8\")   Wt 55.5 kg (122 lb 6.4 oz)   SpO2 100%   BMI 18.61 kg/m²     Physical Exam  Vitals reviewed.   Constitutional:       General: She is not in acute distress.     Appearance: She is well-developed.   Eyes:      Conjunctiva/sclera: Conjunctivae normal.      Pupils: Pupils are equal, round, and reactive to light.   Neck:      Vascular: No JVD.   Cardiovascular:      Rate and Rhythm: Normal rate and regular rhythm.      Pulses:    "        Carotid pulses are 2+ on the right side and 2+ on the left side.     Heart sounds: Normal heart sounds. No murmur heard.    No friction rub. No gallop.   Pulmonary:      Effort: Pulmonary effort is normal. No accessory muscle usage or respiratory distress.      Breath sounds: Normal breath sounds. No wheezing or rales.   Abdominal:      General: There is no distension.      Palpations: Abdomen is soft. There is no mass.      Tenderness: There is no abdominal tenderness.   Musculoskeletal:      Cervical back: Normal range of motion and neck supple.      Right lower leg: No edema.      Left lower leg: No edema.   Skin:     General: Skin is warm and dry.      Findings: No rash.      Nails: There is no clubbing.   Neurological:      Mental Status: She is alert and oriented to person, place, and time.   Psychiatric:         Behavior: Behavior normal.            08/08/2016 EKG: Normal sinus rhythm, rate 63. Reviewed by myself.     11/16/2009 ECHOCARDIOGRAM  EF 65%.  No valvular disease.  Pulmonary artery pressure 25-30 mmHg.     11/17/2009 CARDIAC CATHETERIZATION  EF 75%.  Minimal coronary artery disease.  Medical therapy and smoking cessation recommended.     CARDIAC CATHETERIZATION 08/25/2016  A.  Left heart catheterization.  B.  Left ventriculography.  C.  Selective coronary artery.  D.  Right radial artery approach.  1.  EF 64%.  Normal wall motion.  2.  Proximal LAD at 20% ostial.  3.  Ostial D1 50%-60%.     06/13/2017 MPI   No reversible defects that would indicate ischemia.    Normal left ventricular size, ejection fraction, and wall motion.    ECG INTERPRETATION   Negative stress ECG for ischemia.      Assessment & Plan     1. Coronary artery disease, non-occlusive     2. Essential hypertension, benign     3. Tobacco use     4. Dyslipidemia  LIPID PANEL       Medical Decision Making: Today's Assessment/Status/Plan:   Assessment  1.  CAD.  2.  Hypertension.    3.  Hyperlipidemia.   4.  Diabetes mellitus.  5.   Chronic tobacco use.  6.  Insomnia/sleep disorder.  7.  Adult situational stress syndrome.     Recommendations/Discussion:  1.  CAD: Clinically stable, continue aspirin, atorvastatin.  2.  Hypertension: BP normal, at goal, continue lisinopril.  3.  Dyslipidemia: Prior LDL 66, at goal, continue atorvastatin, follow-up lipid panel.  4.  Diabetes mellitus: Most recent A1c 6.5%, at goal, continue Metformin, consider SGLT2 inhibitor for cardioprotection will defer to PCP.  5.  Smoking cessation: Discussed and encouraged patient to stop smoking.  6.  Other: Weight loss, evaluation per PCP.  7.  RTC 1 year, sooner if necessary.

## 2022-03-19 ENCOUNTER — HOSPITAL ENCOUNTER (OUTPATIENT)
Dept: LAB | Facility: MEDICAL CENTER | Age: 67
End: 2022-03-19
Attending: STUDENT IN AN ORGANIZED HEALTH CARE EDUCATION/TRAINING PROGRAM
Payer: MEDICARE

## 2022-03-19 ENCOUNTER — HOSPITAL ENCOUNTER (OUTPATIENT)
Dept: LAB | Facility: MEDICAL CENTER | Age: 67
End: 2022-03-19
Attending: INTERNAL MEDICINE
Payer: MEDICARE

## 2022-03-19 DIAGNOSIS — R63.4 WEIGHT LOSS: ICD-10-CM

## 2022-03-19 DIAGNOSIS — I10 ESSENTIAL HYPERTENSION: ICD-10-CM

## 2022-03-19 DIAGNOSIS — E11.65 CONTROLLED TYPE 2 DIABETES MELLITUS WITH HYPERGLYCEMIA, WITHOUT LONG-TERM CURRENT USE OF INSULIN (HCC): ICD-10-CM

## 2022-03-19 LAB
ALBUMIN SERPL BCP-MCNC: 4.7 G/DL (ref 3.2–4.9)
ALBUMIN/GLOB SERPL: 1.9 G/DL
ALP SERPL-CCNC: 75 U/L (ref 30–99)
ALT SERPL-CCNC: 23 U/L (ref 2–50)
ANION GAP SERPL CALC-SCNC: 12 MMOL/L (ref 7–16)
AST SERPL-CCNC: 22 U/L (ref 12–45)
BILIRUB SERPL-MCNC: 0.3 MG/DL (ref 0.1–1.5)
BUN SERPL-MCNC: 11 MG/DL (ref 8–22)
CALCIUM SERPL-MCNC: 9.8 MG/DL (ref 8.5–10.5)
CHLORIDE SERPL-SCNC: 103 MMOL/L (ref 96–112)
CHOLEST SERPL-MCNC: 122 MG/DL (ref 100–199)
CO2 SERPL-SCNC: 25 MMOL/L (ref 20–33)
CREAT SERPL-MCNC: 0.61 MG/DL (ref 0.5–1.4)
ERYTHROCYTE [DISTWIDTH] IN BLOOD BY AUTOMATED COUNT: 50.2 FL (ref 35.9–50)
EST. AVERAGE GLUCOSE BLD GHB EST-MCNC: 192 MG/DL
FASTING STATUS PATIENT QL REPORTED: NORMAL
GFR SERPLBLD CREATININE-BSD FMLA CKD-EPI: 99 ML/MIN/1.73 M 2
GLOBULIN SER CALC-MCNC: 2.5 G/DL (ref 1.9–3.5)
GLUCOSE SERPL-MCNC: 182 MG/DL (ref 65–99)
HBA1C MFR BLD: 8.3 % (ref 4–5.6)
HCT VFR BLD AUTO: 49 % (ref 37–47)
HDLC SERPL-MCNC: 59 MG/DL
HGB BLD-MCNC: 15.5 G/DL (ref 12–16)
LDLC SERPL CALC-MCNC: 47 MG/DL
MCH RBC QN AUTO: 30.8 PG (ref 27–33)
MCHC RBC AUTO-ENTMCNC: 31.6 G/DL (ref 33.6–35)
MCV RBC AUTO: 97.4 FL (ref 81.4–97.8)
PLATELET # BLD AUTO: 261 K/UL (ref 164–446)
PMV BLD AUTO: 10.8 FL (ref 9–12.9)
POTASSIUM SERPL-SCNC: 4.3 MMOL/L (ref 3.6–5.5)
PROT SERPL-MCNC: 7.2 G/DL (ref 6–8.2)
RBC # BLD AUTO: 5.03 M/UL (ref 4.2–5.4)
SODIUM SERPL-SCNC: 140 MMOL/L (ref 135–145)
TRIGL SERPL-MCNC: 82 MG/DL (ref 0–149)
TSH SERPL DL<=0.005 MIU/L-ACNC: 1.37 UIU/ML (ref 0.38–5.33)
WBC # BLD AUTO: 8.9 K/UL (ref 4.8–10.8)

## 2022-03-19 PROCEDURE — 36415 COLL VENOUS BLD VENIPUNCTURE: CPT

## 2022-03-19 PROCEDURE — 83036 HEMOGLOBIN GLYCOSYLATED A1C: CPT

## 2022-03-19 PROCEDURE — 84443 ASSAY THYROID STIM HORMONE: CPT

## 2022-03-19 PROCEDURE — 80061 LIPID PANEL: CPT

## 2022-03-19 PROCEDURE — 85027 COMPLETE CBC AUTOMATED: CPT

## 2022-03-19 PROCEDURE — 80053 COMPREHEN METABOLIC PANEL: CPT

## 2022-03-22 ENCOUNTER — TELEPHONE (OUTPATIENT)
Dept: CARDIOLOGY | Facility: MEDICAL CENTER | Age: 67
End: 2022-03-22
Payer: MEDICARE

## 2022-03-22 NOTE — TELEPHONE ENCOUNTER
----- Message from Beck Man M.D. sent at 3/22/2022 11:22 AM PDT -----  Please notify Mary that her cholesterol is normal  No new recommendations     Sarecycline Pregnancy And Lactation Text: This medication is Pregnancy Category D and not consider safe during pregnancy. It is also excreted in breast milk. Doxycycline Pregnancy And Lactation Text: This medication is Pregnancy Category D and not consider safe during pregnancy. It is also excreted in breast milk but is considered safe for shorter treatment courses. Benzoyl Peroxide Pregnancy And Lactation Text: This medication is Pregnancy Category C. It is unknown if benzoyl peroxide is excreted in breast milk. Minocycline Counseling: Patient advised regarding possible photosensitivity and discoloration of the teeth, skin, lips, tongue and gums.  Patient instructed to avoid sunlight, if possible.  When exposed to sunlight, patients should wear protective clothing, sunglasses, and sunscreen.  The patient was instructed to call the office immediately if the following severe adverse effects occur:  hearing changes, easy bruising/bleeding, severe headache, or vision changes.  The patient verbalized understanding of the proper use and possible adverse effects of minocycline.  All of the patient's questions and concerns were addressed. Tazorac Counseling:  Patient advised that medication is irritating and drying.  Patient may need to apply sparingly and wash off after an hour before eventually leaving it on overnight.  The patient verbalized understanding of the proper use and possible adverse effects of tazorac.  All of the patient's questions and concerns were addressed. Spironolactone Counseling: Patient advised regarding risks of diarrhea, abdominal pain, hyperkalemia, birth defects (for female patients), liver toxicity and renal toxicity. The patient may need blood work to monitor liver and kidney function and potassium levels while on therapy. The patient verbalized understanding of the proper use and possible adverse effects of spironolactone.  All of the patient's questions and concerns were addressed. Include Pregnancy/Lactation Warning?: No Bactrim Counseling:  I discussed with the patient the risks of sulfa antibiotics including but not limited to GI upset, allergic reaction, drug rash, diarrhea, dizziness, photosensitivity, and yeast infections.  Rarely, more serious reactions can occur including but not limited to aplastic anemia, agranulocytosis, methemoglobinemia, blood dyscrasias, liver or kidney failure, lung infiltrates or desquamative/blistering drug rashes. Topical Retinoid Pregnancy And Lactation Text: This medication is Pregnancy Category C. It is unknown if this medication is excreted in breast milk. Isotretinoin Pregnancy And Lactation Text: This medication is Pregnancy Category X and is considered extremely dangerous during pregnancy. It is unknown if it is excreted in breast milk. Topical Retinoid counseling:  Patient advised to apply a pea-sized amount only at bedtime and wait 30 minutes after washing their face before applying.  If too drying, patient may add a non-comedogenic moisturizer. The patient verbalized understanding of the proper use and possible adverse effects of retinoids.  All of the patient's questions and concerns were addressed. High Dose Vitamin A Pregnancy And Lactation Text: High dose vitamin A therapy is contraindicated during pregnancy and breast feeding. Topical Sulfur Applications Pregnancy And Lactation Text: This medication is Pregnancy Category C and has an unknown safety profile during pregnancy. It is unknown if this topical medication is excreted in breast milk. High Dose Vitamin A Counseling: Side effects reviewed, pt to contact office should one occur. Isotretinoin Counseling: Patient should get monthly blood tests, not donate blood, not drive at night if vision affected, not share medication, and not undergo elective surgery for 6 months after tx completed. Side effects reviewed, pt to contact office should one occur. Erythromycin Counseling:  I discussed with the patient the risks of erythromycin including but not limited to GI upset, allergic reaction, drug rash, diarrhea, increase in liver enzymes, and yeast infections. Topical Clindamycin Counseling: Patient counseled that this medication may cause skin irritation or allergic reactions.  In the event of skin irritation, the patient was advised to reduce the amount of the drug applied or use it less frequently.   The patient verbalized understanding of the proper use and possible adverse effects of clindamycin.  All of the patient's questions and concerns were addressed. Detail Level: Detailed Erythromycin Pregnancy And Lactation Text: This medication is Pregnancy Category B and is considered safe during pregnancy. It is also excreted in breast milk. Topical Sulfur Applications Counseling: Topical Sulfur Counseling: Patient counseled that this medication may cause skin irritation or allergic reactions.  In the event of skin irritation, the patient was advised to reduce the amount of the drug applied or use it less frequently.   The patient verbalized understanding of the proper use and possible adverse effects of topical sulfur application.  All of the patient's questions and concerns were addressed. Dapsone Pregnancy And Lactation Text: This medication is Pregnancy Category C and is not considered safe during pregnancy or breast feeding. Birth Control Pills Counseling: Birth Control Pill Counseling: I discussed with the patient the potential side effects of OCPs including but not limited to increased risk of stroke, heart attack, thrombophlebitis, deep venous thrombosis, hepatic adenomas, breast changes, GI upset, headaches, and depression.  The patient verbalized understanding of the proper use and possible adverse effects of OCPs. All of the patient's questions and concerns were addressed. Sarecycline Counseling: Patient advised regarding possible photosensitivity and discoloration of the teeth, skin, lips, tongue and gums.  Patient instructed to avoid sunlight, if possible.  When exposed to sunlight, patients should wear protective clothing, sunglasses, and sunscreen.  The patient was instructed to call the office immediately if the following severe adverse effects occur:  hearing changes, easy bruising/bleeding, severe headache, or vision changes.  The patient verbalized understanding of the proper use and possible adverse effects of sarecycline.  All of the patient's questions and concerns were addressed. Tetracycline Counseling: Patient counseled regarding possible photosensitivity and increased risk for sunburn.  Patient instructed to avoid sunlight, if possible.  When exposed to sunlight, patients should wear protective clothing, sunglasses, and sunscreen.  The patient was instructed to call the office immediately if the following severe adverse effects occur:  hearing changes, easy bruising/bleeding, severe headache, or vision changes.  The patient verbalized understanding of the proper use and possible adverse effects of tetracycline.  All of the patient's questions and concerns were addressed. Patient understands to avoid pregnancy while on therapy due to potential birth defects. Tazorac Pregnancy And Lactation Text: This medication is not safe during pregnancy. It is unknown if this medication is excreted in breast milk. Dapsone Counseling: I discussed with the patient the risks of dapsone including but not limited to hemolytic anemia, agranulocytosis, rashes, methemoglobinemia, kidney failure, peripheral neuropathy, headaches, GI upset, and liver toxicity.  Patients who start dapsone require monitoring including baseline LFTs and weekly CBCs for the first month, then every month thereafter.  The patient verbalized understanding of the proper use and possible adverse effects of dapsone.  All of the patient's questions and concerns were addressed. Doxycycline Counseling:  Patient counseled regarding possible photosensitivity and increased risk for sunburn.  Patient instructed to avoid sunlight, if possible.  When exposed to sunlight, patients should wear protective clothing, sunglasses, and sunscreen.  The patient was instructed to call the office immediately if the following severe adverse effects occur:  hearing changes, easy bruising/bleeding, severe headache, or vision changes.  The patient verbalized understanding of the proper use and possible adverse effects of doxycycline.  All of the patient's questions and concerns were addressed. Bactrim Pregnancy And Lactation Text: This medication is Pregnancy Category D and is known to cause fetal risk.  It is also excreted in breast milk. Birth Control Pills Pregnancy And Lactation Text: This medication should be avoided if pregnant and for the first 30 days post-partum. Topical Clindamycin Pregnancy And Lactation Text: This medication is Pregnancy Category B and is considered safe during pregnancy. It is unknown if it is excreted in breast milk. Spironolactone Pregnancy And Lactation Text: This medication can cause feminization of the male fetus and should be avoided during pregnancy. The active metabolite is also found in breast milk. Benzoyl Peroxide Counseling: Patient counseled that medicine may cause skin irritation and bleach clothing.  In the event of skin irritation, the patient was advised to reduce the amount of the drug applied or use it less frequently.   The patient verbalized understanding of the proper use and possible adverse effects of benzoyl peroxide.  All of the patient's questions and concerns were addressed. Azithromycin Pregnancy And Lactation Text: This medication is considered safe during pregnancy and is also secreted in breast milk. Azithromycin Counseling:  I discussed with the patient the risks of azithromycin including but not limited to GI upset, allergic reaction, drug rash, diarrhea, and yeast infections.

## 2022-03-23 NOTE — TELEPHONE ENCOUNTER
Pt returned call. Tried reaching nurse, but unavailable. Please call Pt back at [phone].    Thank you

## 2022-03-23 NOTE — TELEPHONE ENCOUNTER
Received call from Vanesa coordinator patient calling back.     Spoke to the patient and relayed SW results and recommendations. Answered all questions and concerns, appreciative of call.

## 2022-03-23 NOTE — TELEPHONE ENCOUNTER
Brandon Smith  Pt is returning call.     [facility] - [phone]    Thank you,   Onelia MARIN

## 2022-03-24 ENCOUNTER — APPOINTMENT (OUTPATIENT)
Dept: INTERNAL MEDICINE | Facility: OTHER | Age: 67
End: 2022-03-24
Payer: MEDICARE

## 2022-04-01 ENCOUNTER — HOSPITAL ENCOUNTER (OUTPATIENT)
Dept: RADIOLOGY | Facility: MEDICAL CENTER | Age: 67
End: 2022-04-01
Attending: STUDENT IN AN ORGANIZED HEALTH CARE EDUCATION/TRAINING PROGRAM
Payer: MEDICARE

## 2022-04-01 DIAGNOSIS — R63.4 WEIGHT LOSS: ICD-10-CM

## 2022-04-01 PROCEDURE — 700117 HCHG RX CONTRAST REV CODE 255: Performed by: STUDENT IN AN ORGANIZED HEALTH CARE EDUCATION/TRAINING PROGRAM

## 2022-04-01 PROCEDURE — 71250 CT THORAX DX C-: CPT | Mod: MG

## 2022-04-01 RX ADMIN — IOHEXOL 25 ML: 240 INJECTION, SOLUTION INTRATHECAL; INTRAVASCULAR; INTRAVENOUS; ORAL at 15:00

## 2022-04-25 ENCOUNTER — OFFICE VISIT (OUTPATIENT)
Dept: INTERNAL MEDICINE | Facility: OTHER | Age: 67
End: 2022-04-25
Payer: MEDICARE

## 2022-04-25 VITALS
HEART RATE: 55 BPM | OXYGEN SATURATION: 96 % | SYSTOLIC BLOOD PRESSURE: 167 MMHG | DIASTOLIC BLOOD PRESSURE: 85 MMHG | HEIGHT: 68 IN | TEMPERATURE: 97.2 F | BODY MASS INDEX: 18.7 KG/M2 | WEIGHT: 123.4 LBS

## 2022-04-25 DIAGNOSIS — Z01.419 WELL FEMALE EXAM WITH ROUTINE GYNECOLOGICAL EXAM: ICD-10-CM

## 2022-04-25 DIAGNOSIS — R63.4 UNEXPLAINED WEIGHT LOSS: ICD-10-CM

## 2022-04-25 DIAGNOSIS — R53.82 CHRONIC FATIGUE: ICD-10-CM

## 2022-04-25 DIAGNOSIS — Z79.899 MEDICATION MANAGEMENT: ICD-10-CM

## 2022-04-25 DIAGNOSIS — R06.83 SNORING: ICD-10-CM

## 2022-04-25 DIAGNOSIS — I25.10 CORONARY ARTERY DISEASE, NON-OCCLUSIVE: ICD-10-CM

## 2022-04-25 DIAGNOSIS — Z91.89 AT RISK FOR SLEEP APNEA: ICD-10-CM

## 2022-04-25 DIAGNOSIS — R91.8 LUNG NODULE, MULTIPLE: ICD-10-CM

## 2022-04-25 DIAGNOSIS — Z72.0 TOBACCO USE: ICD-10-CM

## 2022-04-25 DIAGNOSIS — Z79.4 TYPE 2 DIABETES MELLITUS WITH COMPLICATION, WITH LONG-TERM CURRENT USE OF INSULIN (HCC): ICD-10-CM

## 2022-04-25 DIAGNOSIS — F32.1 CURRENT MODERATE EPISODE OF MAJOR DEPRESSIVE DISORDER, UNSPECIFIED WHETHER RECURRENT (HCC): ICD-10-CM

## 2022-04-25 DIAGNOSIS — E11.8 TYPE 2 DIABETES MELLITUS WITH COMPLICATION, WITH LONG-TERM CURRENT USE OF INSULIN (HCC): ICD-10-CM

## 2022-04-25 DIAGNOSIS — G47.33 OBSTRUCTIVE SLEEP APNEA (ADULT) (PEDIATRIC): ICD-10-CM

## 2022-04-25 PROCEDURE — 99214 OFFICE O/P EST MOD 30 MIN: CPT | Mod: GC | Performed by: INTERNAL MEDICINE

## 2022-04-25 RX ORDER — EMPAGLIFLOZIN 10 MG/1
10 TABLET, FILM COATED ORAL DAILY
Qty: 30 TABLET | Refills: 1 | Status: SHIPPED | OUTPATIENT
Start: 2022-04-25 | End: 2022-04-28 | Stop reason: SDUPTHER

## 2022-04-25 RX ORDER — BUPROPION HYDROCHLORIDE 300 MG/1
300 TABLET ORAL EVERY MORNING
Qty: 60 TABLET | Refills: 1 | Status: SHIPPED | OUTPATIENT
Start: 2022-04-25 | End: 2022-06-23

## 2022-04-25 RX ORDER — BUPROPION HYDROCHLORIDE 150 MG/1
150 TABLET ORAL EVERY MORNING
Qty: 30 TABLET | Status: CANCELLED | OUTPATIENT
Start: 2022-04-25

## 2022-04-25 ASSESSMENT — ENCOUNTER SYMPTOMS: WEIGHT LOSS: 1

## 2022-04-25 ASSESSMENT — FIBROSIS 4 INDEX: FIB4 SCORE: 1.14

## 2022-04-25 NOTE — PROGRESS NOTES
"Subjective     Mary Moore is a 66 y.o. female who presents with Follow-Up and Weight Loss            #Weight loss:   -Has lost 30 lbs unintentionally since 2019.   -No change in diet, has good appetite.  -Has daytime fatigue, falls asleep easily.  -Underwent lab testing: CBC, CMP, TSH: All within normal limits  -CT chest, abdomen and pelvis for malignancy screening: Multiple pulmonary nodules noted, which are unchanged from prior  Imaging    #Type 2 diabetes:  -A1c has worsened from 6.5->8.3, as patient was trying to eat a carb rich diet (high in rice, potatoes, sweets) in order to gain weight.  -She is compliant with medications.    #Depression:  -Patient is depressed that she is unable to gain weight, and unable to quit smoking.  -She denies SI/HI        Review of Systems   Constitutional: Positive for malaise/fatigue and weight loss. Negative for chills and fever.   HENT: Negative for congestion and sore throat.    Eyes: Negative for blurred vision and double vision.   Respiratory: Negative for cough, sputum production and shortness of breath.    Cardiovascular: Negative for chest pain, palpitations and leg swelling.   Gastrointestinal: Negative for abdominal pain, blood in stool, constipation, diarrhea, heartburn, melena, nausea and vomiting.   Genitourinary: Negative for dysuria and frequency.   Musculoskeletal: Negative for back pain and myalgias.   Neurological: Negative for dizziness, sensory change, speech change, focal weakness and headaches.   Psychiatric/Behavioral: Positive for depression and substance abuse. Negative for suicidal ideas.              Objective     BP (!) 167/85 (BP Location: Left arm, Patient Position: Sitting, BP Cuff Size: Adult)   Pulse (!) 55   Temp 36.2 °C (97.2 °F) (Temporal)   Ht 1.727 m (5' 8\")   Wt 56 kg (123 lb 6.4 oz)   SpO2 96%   BMI 18.76 kg/m²      Physical Exam  Constitutional:       General: She is not in acute distress.  HENT:      Head: Normocephalic and " atraumatic.      Nose: Nose normal.      Mouth/Throat:      Mouth: Mucous membranes are moist.      Comments: MP 3  Eyes:      Extraocular Movements: Extraocular movements intact.   Cardiovascular:      Rate and Rhythm: Normal rate and regular rhythm.      Pulses: Normal pulses.      Heart sounds: Normal heart sounds.   Pulmonary:      Effort: Pulmonary effort is normal.      Breath sounds: Normal breath sounds. No wheezing or rales.   Abdominal:      Palpations: Abdomen is soft.   Musculoskeletal:         General: Normal range of motion.      Cervical back: Normal range of motion and neck supple.      Right lower leg: No edema.      Left lower leg: No edema.   Skin:     Capillary Refill: Capillary refill takes less than 2 seconds.   Neurological:      General: No focal deficit present.      Mental Status: She is alert and oriented to person, place, and time.   Psychiatric:         Mood and Affect: Mood normal.         Behavior: Behavior normal.         Thought Content: Thought content does not include homicidal or suicidal ideation.                             Assessment & Plan        1. Unexplained weight loss  No abnormality on labs, imaging shows stable pulmonary nodules.  Will refer to pulmonology for follow-up lung cough lung nodules.  We will start patient on Wellbutrin which will help with weight gain, depression and smoking cessation.  - Referral to Pulmonary and Sleep Medicine  - buPROPion (WELLBUTRIN XL) 300 MG XL tablet; Take 1 Tablet by mouth every morning.  Dispense: 60 Tablet; Refill: 1    2. Current moderate episode of major depressive disorder, unspecified whether recurrent ([facility])  No SI/HI.  Start by Wellbutrin, no prior history of seizures.  - buPROPion (WELLBUTRIN XL) 300 MG XL tablet; Take 1 Tablet by mouth every morning.  Dispense: 60 Tablet; Refill: 1    3. Type 2 diabetes mellitus with complication, with long-term current use of insulin ([facility])    - Uncontrolled A1c 6.5->8.3, Start SGLT2  inhibitor  - Current regimen: Metformin 1000 mg BID  - Denies polyuria/ polydipsia, weight loss, changes in vision, numbness/tingling, ulcerations or poor healing wounds.  - Inspects feet daily   - Last lipid profile 3/22(TC: 122, LDL 47). On statin: Atorvastatin 40mg  - Urine micro-albumin creatinine ratio 28 in 4/20. Next due Now. On ACE/ARB Lisinopril 40mg    - Empagliflozin (JARDIANCE) 10 MG Tab; Take 10 mg by mouth every day.  Dispense: 30 Tablet; Refill: 1    4. Tobacco use  -Tobacco cessation counseling and education offered.  Smoking cessation class referral offered, patient declined at this time.  - buPROPion (WELLBUTRIN XL) 300 MG XL tablet; Take 1 Tablet by mouth every morning.  Dispense: 60 Tablet; Refill: 1    5. Coronary artery disease, non-occlusive  -Continue follow-up with cardiology  - Empagliflozin (JARDIANCE) 10 MG Tab; Take 10 mg by mouth every day.  Dispense: 30 Tablet; Refill: 1    6. Lung nodule, multiple  - Referral to Pulmonary and Sleep Medicine    7. At risk for sleep apnea  High STOP-BANG score, daytime fatigue and sleepiness, Mallampati 3.  - Referral to Pulmonary and Sleep Medicine  - Polysomnography, 4 or More; Future    8. Well female exam with routine gynecological exam  Requests referral to gynecology for Pap smear and other female health follow-up.  - Referral to Gynecology    9. Snoring  - Referral to Pulmonary and Sleep Medicine    10. Chronic fatigue  - Referral to Pulmonary and Sleep Medicine    11. Obstructive sleep apnea (adult) (pediatric)   - Polysomnography, 4 or More; Future

## 2022-04-25 NOTE — PATIENT INSTRUCTIONS
For your depression, I am referring you to a psychologist.  Please call [phone] at [facility] to schedule appointment.  I am starting you on a medication to help with depression, smoking cessation.  Please check your blood pressure every day and bring back blood pressure log at next visit.  For concern of sleep apnea, I am referring you for sleep study.  I am referring you to lung doctor for the lung nodules.  I am referring you to gynecologist for Pap smear.

## 2022-04-26 ENCOUNTER — TELEPHONE (OUTPATIENT)
Dept: INTERNAL MEDICINE | Facility: OTHER | Age: 67
End: 2022-04-26
Payer: MEDICARE

## 2022-04-26 DIAGNOSIS — F32.1 CURRENT MODERATE EPISODE OF MAJOR DEPRESSIVE DISORDER, UNSPECIFIED WHETHER RECURRENT (HCC): ICD-10-CM

## 2022-04-26 PROBLEM — J80: Status: RESOLVED | Noted: 2017-06-02 | Resolved: 2022-04-26

## 2022-04-26 ASSESSMENT — ENCOUNTER SYMPTOMS
NAUSEA: 0
HEADACHES: 0
DIZZINESS: 0
FOCAL WEAKNESS: 0
DIARRHEA: 0
SORE THROAT: 0
COUGH: 0
MYALGIAS: 0
SENSORY CHANGE: 0
SPUTUM PRODUCTION: 0
SHORTNESS OF BREATH: 0
PALPITATIONS: 0
BLURRED VISION: 0
CONSTIPATION: 0
FEVER: 0
BACK PAIN: 0
CHILLS: 0
VOMITING: 0
DOUBLE VISION: 0
ABDOMINAL PAIN: 0
HEARTBURN: 0
DEPRESSION: 1
BLOOD IN STOOL: 0
SPEECH CHANGE: 0

## 2022-04-26 ASSESSMENT — LIFESTYLE VARIABLES: SUBSTANCE_ABUSE: 1

## 2022-04-27 DIAGNOSIS — I10 ESSENTIAL HYPERTENSION: Chronic | ICD-10-CM

## 2022-04-27 RX ORDER — LISINOPRIL 40 MG/1
TABLET ORAL
Qty: 100 TABLET | Refills: 3 | Status: SHIPPED | OUTPATIENT
Start: 2022-04-27 | End: 2023-03-02 | Stop reason: SDUPTHER

## 2022-04-28 DIAGNOSIS — I25.10 CORONARY ARTERY DISEASE, NON-OCCLUSIVE: ICD-10-CM

## 2022-04-28 DIAGNOSIS — E11.8 TYPE 2 DIABETES MELLITUS WITH COMPLICATION, WITH LONG-TERM CURRENT USE OF INSULIN (HCC): ICD-10-CM

## 2022-04-28 DIAGNOSIS — Z79.4 TYPE 2 DIABETES MELLITUS WITH COMPLICATION, WITH LONG-TERM CURRENT USE OF INSULIN (HCC): ICD-10-CM

## 2022-04-28 RX ORDER — EMPAGLIFLOZIN 10 MG/1
10 TABLET, FILM COATED ORAL DAILY
Qty: 100 TABLET | Refills: 0 | Status: SHIPPED | OUTPATIENT
Start: 2022-04-28

## 2022-04-28 NOTE — TELEPHONE ENCOUNTER
Received a fax from insurance company requesting a 100 day supply to be sent to pharmacy for jardiance to better assist the patient. PCP please advise if this is appropriate and send to pharmacy

## 2022-05-17 DIAGNOSIS — Z79.899 MEDICATION MANAGEMENT: ICD-10-CM

## 2022-05-17 DIAGNOSIS — I25.10 CORONARY ARTERY DISEASE, NON-OCCLUSIVE: ICD-10-CM

## 2022-05-17 NOTE — TELEPHONE ENCOUNTER
Metformin Refill    Last seen: 4/25/22 by Dr. Perez  Next appt: None    Was the patient seen in the last year in this department? Yes   Does patient have an active prescription for medications requested? No   Received Request Via: Pharmacy

## 2022-06-07 DIAGNOSIS — I25.10 CORONARY ARTERY DISEASE, NON-OCCLUSIVE: ICD-10-CM

## 2022-06-07 NOTE — TELEPHONE ENCOUNTER
Last seen: 4/25/22 by Dr. Perez   Next appt: None     Was the patient seen in the last year in this department? Yes   Does patient have an active prescription for medications requested? No   Received Request Via: Pharmacy

## 2022-06-22 DIAGNOSIS — Z72.0 TOBACCO USE: ICD-10-CM

## 2022-06-22 DIAGNOSIS — F32.1 CURRENT MODERATE EPISODE OF MAJOR DEPRESSIVE DISORDER, UNSPECIFIED WHETHER RECURRENT (HCC): ICD-10-CM

## 2022-06-22 DIAGNOSIS — R63.4 UNEXPLAINED WEIGHT LOSS: ICD-10-CM

## 2022-06-22 NOTE — TELEPHONE ENCOUNTER
Bupropion Refill    Last seen: 4/25/22 by Dr. Perez  Next appt: None    Was the patient seen in the last year in this department? Yes   Does patient have an active prescription for medications requested? No   Received Request Via: Pharmacy

## 2022-06-23 RX ORDER — BUPROPION HYDROCHLORIDE 300 MG/1
TABLET ORAL
Qty: 60 TABLET | Refills: 1 | Status: SHIPPED | OUTPATIENT
Start: 2022-06-23 | End: 2022-11-15

## 2022-07-26 ENCOUNTER — TELEPHONE (OUTPATIENT)
Dept: HEALTH INFORMATION MANAGEMENT | Facility: OTHER | Age: 67
End: 2022-07-26

## 2022-07-26 ENCOUNTER — TELEPHONE (OUTPATIENT)
Dept: HEALTH INFORMATION MANAGEMENT | Facility: OTHER | Age: 67
End: 2022-07-26
Payer: MEDICARE

## 2022-07-26 NOTE — TELEPHONE ENCOUNTER
Scheduled Comprehensive Health Assessment on 08/3/22 at 3 pm with Dr. Godfrey at [address] [facility]. Also scheduled Mary with a New Patient appointment with Jarrod Palma on 08/26/22 at 11:40 am at [address]. No exposure to Covid, no other questions or concerns.

## 2022-08-03 ENCOUNTER — OFFICE VISIT (OUTPATIENT)
Dept: SLEEP MEDICINE | Facility: MEDICAL CENTER | Age: 67
End: 2022-08-03
Payer: MEDICARE

## 2022-08-03 VITALS
DIASTOLIC BLOOD PRESSURE: 72 MMHG | BODY MASS INDEX: 19.13 KG/M2 | RESPIRATION RATE: 16 BRPM | HEIGHT: 66 IN | SYSTOLIC BLOOD PRESSURE: 108 MMHG | HEART RATE: 71 BPM | OXYGEN SATURATION: 96 % | WEIGHT: 119 LBS

## 2022-08-03 DIAGNOSIS — G47.30 SLEEP DISORDER BREATHING: Primary | ICD-10-CM

## 2022-08-03 DIAGNOSIS — Z79.4 TYPE 2 DIABETES MELLITUS WITH COMPLICATION, WITH LONG-TERM CURRENT USE OF INSULIN (HCC): ICD-10-CM

## 2022-08-03 DIAGNOSIS — F51.04 CHRONIC INSOMNIA: ICD-10-CM

## 2022-08-03 DIAGNOSIS — E11.8 TYPE 2 DIABETES MELLITUS WITH COMPLICATION, WITH LONG-TERM CURRENT USE OF INSULIN (HCC): ICD-10-CM

## 2022-08-03 PROBLEM — F32.9 MAJOR DEPRESSIVE DISORDER: Status: ACTIVE | Noted: 2022-08-03

## 2022-08-03 PROBLEM — E78.5 DYSLIPIDEMIA ASSOCIATED WITH TYPE 2 DIABETES MELLITUS (HCC): Status: ACTIVE | Noted: 2022-08-03

## 2022-08-03 PROBLEM — F32.9 MAJOR DEPRESSIVE DISORDER: Status: RESOLVED | Noted: 2022-08-03 | Resolved: 2022-08-03

## 2022-08-03 PROBLEM — E11.69 DYSLIPIDEMIA ASSOCIATED WITH TYPE 2 DIABETES MELLITUS (HCC): Status: ACTIVE | Noted: 2022-08-03

## 2022-08-03 PROBLEM — F33.42 RECURRENT MAJOR DEPRESSIVE DISORDER, IN FULL REMISSION (HCC): Status: ACTIVE | Noted: 2022-08-03

## 2022-08-03 PROCEDURE — 99204 OFFICE O/P NEW MOD 45 MIN: CPT | Performed by: STUDENT IN AN ORGANIZED HEALTH CARE EDUCATION/TRAINING PROGRAM

## 2022-08-03 ASSESSMENT — FIBROSIS 4 INDEX: FIB4 SCORE: 1.14

## 2022-08-03 NOTE — PROGRESS NOTES
[facility] Consult Note     Date: 8/3/2022 / Time: 2:15 PM      Thank you for requesting a sleep medicine consultation on Mary Moore at the sleep center. Presents today with the chief complaints of snoring,  occasional excessive daytime sleepiness. She is referred by Adrianne Perez M.D.  [address] [facility] [place] [address] for evaluation and treatment of sleep disorder breathing .     HISTORY OF PRESENT ILLNESS:     Mary Moore is a 66 y.o. female with depression, fibromyalgia, hypertension, hyperlipidemia, type 2 diabetes mellitus, snoring and chronic insomnia.  Presents to Sleep Clinic for evaluation of poor sleep.    She reports she is been having increased trouble with sleep over the last few months.  She generally can fall asleep initially fairly quickly but she will awaken after about 10 to 20 minutes and have a hard time getting back to sleep.  It will then take her 30 minutes to an hour to fall back asleep.  When she is asleep she will have 2 more awakenings on average at night but no trouble getting back to sleep.  She is finding that her sleep is nonrestorative.  She does occasionally wake feeling that she is short of breath and has to catch her breath.  She is tired at times during the day.  She does have mental fog during the day, irritability at times.    As per supplemental questionnaire to be scanned or imported into chart:    Sleep Schedule  Bedtime: Weekday & Weekend 10pm  Wake time: Weekday & Weekend 730am  Sleep-onset latency: 30-1hour   Awakenings from sleep: 2  Difficulty falling back asleep: generally   Bedroom partner: none  Naps: No     DAYTIME SYMPTOMS:   Excessive daytime sleepiness: Yes  Daytime fatigue: Yes  Difficulty concentrating: Yes  Memory problems: Yes  Irritability:Yes short tempered   Work/school performance issues: No   Sleepiness with driving: No   Caffeine/stimulant use: Yes  Alcohol use:No     SLEEP RELATED SYMPTOMS  Snoring: Yes  Witnessed apnea  "or gasping/choking: Yes, pauses in breathing a few times in the past.   Dry mouth or mouth breathing: No   Sweating: Yes  Teeth grinding/biting: No   Morning headaches: No   Refreshed Upon Awakening: No      SLEEP RELATED BEHAVIORS:  Parasomnias (walking, talking, eating, violence): No   Leg kicking: Yes  Restless legs - \"urge to move\": No   Nightmares: No  Recurrent: No   Dream enactment: No      NARCOLEPSY:  Cataplexy: No   Sleep paralysis: No   Sleep attacks: No   Hypnagogic/hypnopompic hallucinations: No     MEDICAL HISTORY  Past Medical History:   Diagnosis Date   • Anxiety 2016   • Chest pain 2016   • Constipation 2016   • Dyslipidemia 2016   • Epigastric pain 2016   • Facial rash 2016   • Fibromyalgia 2016   • GERD (gastroesophageal reflux disease) 2016   • HTN (hypertension) 2016   • Hypercalcemia 2016   • Hyperlipidemia    • Hypertension     bordeline    • Insomnia     chronic   • Lentigo 2016   • Lupus erythematosus 2016   • Multiple pulmonary nodules 2016   • Urinary incontinence 2016   • Vitamin D deficiency 2016        SURGICAL HISTORY  Past Surgical History:   Procedure Laterality Date   • RECOVERY  2016    Procedure: CATH LAB-[facility] W/POSSIBLE-RITU;  Surgeon: Recoveryonly Surgery;  [place] [facility]  Service:         FAMILY HISTORY  Family History   Problem Relation Age of Onset   • Heart Disease Father    • Stroke Father        SOCIAL HISTORY  Social History     Socioeconomic History   • Marital status:    Tobacco Use   • Smoking status: Current Some Day Smoker     Packs/day: 0.25     Years: 40.00     Pack years: 10.00     Types: Cigarettes     Last attempt to quit: 2017     Years since quittin.3   • Smokeless tobacco: Never Used   • Tobacco comment: Infrequest ciggarette smoking.    Vaping Use   • Vaping Use: Never used   Substance and Sexual Activity   • Alcohol use: No     " "Alcohol/week: 0.0 oz   • Drug use: No   • Sexual activity: Not Currently     Partners: Male        Occupation:  1pm-5pm M, Tues, thurs     CURRENT MEDICATIONS  Current Outpatient Medications   Medication Sig Dispense Refill   • buPROPion (WELLBUTRIN XL) 300 MG XL tablet TAKE 1 TABLET BY MOUTH EVERY DAY IN THE MORNING 60 Tablet 1   • metoprolol tartrate (LOPRESSOR) 25 MG Tab TAKE 1/2 TABLET BY MOUTH 2 TIMES A DAY 90 Tablet 0   • metformin (GLUCOPHAGE) 1000 MG tablet Take 1 Tablet by mouth 2 times a day. 200 Tablet 0   • Empagliflozin (JARDIANCE) 10 MG Tab Take 10 mg by mouth every day. 100 Tablet 0   • lisinopril (PRINIVIL) 40 MG tablet TAKE 1 TABLET BY MOUTH EVERY  Tablet 3   • atorvastatin (LIPITOR) 40 MG Tab TAKE 1 TABLET BY MOUTH EVERY  Tablet 3   • mirtazapine (REMERON) 15 MG Tab Take 1 Tablet by mouth at bedtime. 30 Tablet 1   • Cholecalciferol (VITAMIN D3) 2000 UNIT Cap Take 1 Cap by mouth every day. 90 Cap 3   • aspirin EC (ECOTRIN) 81 MG Tablet Delayed Response Take 81 mg by mouth every day.       No current facility-administered medications for this visit.       REVIEW OF SYSTEMS  Constitutional: Denies fevers, Denies weight changes  Ears/Nose/Throat/Mouth: Denies nasal congestion or sore throat   Cardiovascular: Denies chest pain  Respiratory: Denies shortness of breath, Denies cough  Gastrointestinal/Hepatic: Denies nausea, vomiting  Sleep: see HPI    Physical Examination:  Vitals/ General Appearance:   Weight/BMI: Body mass index is 19.21 kg/m².  /72 (BP Location: Left arm, Patient Position: Sitting, BP Cuff Size: Adult)   Pulse 71   Resp 16   Ht 1.676 m (5' 6\")   Wt 54 kg (119 lb)   SpO2 96%   Vitals:    08/03/22 1418   BP: 108/72   BP Location: Left arm   Patient Position: Sitting   BP Cuff Size: Adult   Pulse: 71   Resp: 16   SpO2: 96%   Weight: 54 kg (119 lb)   Height: 1.676 m (5' 6\")       Pt. is alert and oriented to time, place and person. Cooperative and in no " apparent distress.     Constitutional: Alert, no distress, well-groomed.  Skin: No rashes in visible areas.  Eye: Round. Conjunctiva clear, lids normal. No icterus.   ENT EXAM  Nasal alae/valves collapsible: No   Nasal septum deviation: Yes  Nasal turbinate hypertrophy: Left: Grade 1   Right: Grade 1  Hard palate narrow: No   Hard palate high: No   Soft palate/uvula (Mallampati score): 3  Tongue Scalloping: No   Retrognathia: No   Micrognathia: No   Cardiovascular:no murmus/gallops/rubs, normal S1 and S2 heart sounds  Pulmonary:Clear to auscultation, No wheezes, No crackles.  Neurologic:Awake, alert and oriented x 3, Normal age appropriate gait, No involuntary motions.  Extremities: No clubbing, cyanosis, or edema       ASSESSMENT AND PLAN   Mary Moore is a 66 y.o. female with depression, fibromyalgia, hypertension, hyperlipidemia, type 2 diabetes mellitus, snoring and chronic insomnia.  Presents to Sleep Clinic for evaluation of poor sleep.    1. Mary Moore  has symptoms of Obstructive Sleep Apnea (GRACE). Mary Moore has symptoms of snoring, apneas, dry mouth, morning headaches, unrefreshed upon awakening. These  interfere with activities of daily living.     Pt has risk factors for GRACE include age and crowded oropharynx.     The pathophysiology of GRACE and the increased risk of cardiovascular morbidity from untreated GRACE is discussed in detail with the patient. She  also has HTN, type 2 diabetes mellitus, depression, chronic insomnia which can be worsened by GRACE.      We have discussed diagnostic options including in-laboratory, attended polysomnography and home sleep testing. We have also discussed treatment options including airway pressurization, reconstructive otolaryngologic surgery, dental appliances and weight management.     Subsequently,treatment options will be discussed based on the diagnostic study. Meanwhile, She is urged to avoid supine sleep, weight gain and alcoholic beverages  since all of these can worsen GRACE. She is cautioned against drowsy driving. If She feels sleepy while driving, advised must pull over for a break/nap, rather than persist on the road, in the interest of Pt's own safety and that of others on the road.    Plan  -  She  will be scheduled for an overnight PSG to assess sleep related breathing disorder.  - Follow up 1-2 weeks after sleep study to discuss results and treatment options moving forward   -Advised to reach out via MyChart or by phone with any questions or concerns.     2.  Chronic Insomnia   With awakenings with difficulty falling back asleep and feeling this is impacting daily functioning for 4 months meets criteria for chronic insomnia. Discussed potential causes of insomnia and importance of having a routine around bedtime.   Discussed potential that this may be due to untreated sleep apnea.  Advised that would benefit from treating sleep apnea first and seeing if insomnia improves.  If her insomnia persists or if there is no sleep apnea would likely benefit from cognitive behavioral therapy for insomnia    Regarding treatment of other past medical problems and general health maintenance,  Pt is urged to follow up with PCP.      Please note portions of this record was created using voice recognition software. I have made every reasonable attempt to correct obvious errors, but I expect that there are errors of grammar and possibly content I did not discover before finalizing the note.

## 2022-08-24 ENCOUNTER — SLEEP STUDY (OUTPATIENT)
Dept: SLEEP MEDICINE | Facility: MEDICAL CENTER | Age: 67
End: 2022-08-24
Attending: STUDENT IN AN ORGANIZED HEALTH CARE EDUCATION/TRAINING PROGRAM
Payer: MEDICARE

## 2022-08-24 DIAGNOSIS — G47.30 SLEEP DISORDER BREATHING: ICD-10-CM

## 2022-08-24 DIAGNOSIS — F51.04 CHRONIC INSOMNIA: ICD-10-CM

## 2022-08-24 DIAGNOSIS — Z79.4 TYPE 2 DIABETES MELLITUS WITH COMPLICATION, WITH LONG-TERM CURRENT USE OF INSULIN (HCC): ICD-10-CM

## 2022-08-24 DIAGNOSIS — E11.8 TYPE 2 DIABETES MELLITUS WITH COMPLICATION, WITH LONG-TERM CURRENT USE OF INSULIN (HCC): ICD-10-CM

## 2022-08-24 PROCEDURE — 95811 POLYSOM 6/>YRS CPAP 4/> PARM: CPT | Performed by: STUDENT IN AN ORGANIZED HEALTH CARE EDUCATION/TRAINING PROGRAM

## 2022-08-26 ENCOUNTER — OFFICE VISIT (OUTPATIENT)
Dept: MEDICAL GROUP | Facility: MEDICAL CENTER | Age: 67
End: 2022-08-26
Payer: MEDICARE

## 2022-08-26 VITALS
HEIGHT: 66 IN | HEART RATE: 70 BPM | SYSTOLIC BLOOD PRESSURE: 132 MMHG | OXYGEN SATURATION: 97 % | DIASTOLIC BLOOD PRESSURE: 72 MMHG | RESPIRATION RATE: 16 BRPM | BODY MASS INDEX: 19.61 KG/M2 | WEIGHT: 122 LBS

## 2022-08-26 DIAGNOSIS — I15.2 HYPERTENSION ASSOCIATED WITH DIABETES (HCC): Chronic | ICD-10-CM

## 2022-08-26 DIAGNOSIS — E11.65 TYPE 2 DIABETES MELLITUS WITH HYPERGLYCEMIA, WITHOUT LONG-TERM CURRENT USE OF INSULIN (HCC): ICD-10-CM

## 2022-08-26 DIAGNOSIS — E11.69 DYSLIPIDEMIA ASSOCIATED WITH TYPE 2 DIABETES MELLITUS (HCC): Chronic | ICD-10-CM

## 2022-08-26 DIAGNOSIS — E11.59 HYPERTENSION ASSOCIATED WITH DIABETES (HCC): Chronic | ICD-10-CM

## 2022-08-26 DIAGNOSIS — Z12.31 ENCOUNTER FOR SCREENING MAMMOGRAM FOR MALIGNANT NEOPLASM OF BREAST: ICD-10-CM

## 2022-08-26 DIAGNOSIS — E78.5 DYSLIPIDEMIA ASSOCIATED WITH TYPE 2 DIABETES MELLITUS (HCC): Chronic | ICD-10-CM

## 2022-08-26 PROBLEM — F33.42 RECURRENT MAJOR DEPRESSIVE DISORDER, IN FULL REMISSION (HCC): Chronic | Status: ACTIVE | Noted: 2022-08-03

## 2022-08-26 LAB
HBA1C MFR BLD: 8.1 % (ref 0–5.6)
INT CON NEG: NEGATIVE
INT CON POS: POSITIVE

## 2022-08-26 PROCEDURE — 83036 HEMOGLOBIN GLYCOSYLATED A1C: CPT | Performed by: FAMILY MEDICINE

## 2022-08-26 PROCEDURE — 99214 OFFICE O/P EST MOD 30 MIN: CPT | Performed by: FAMILY MEDICINE

## 2022-08-26 RX ORDER — MIRTAZAPINE 15 MG/1
15 TABLET, FILM COATED ORAL
Qty: 100 TABLET | Refills: 3 | Status: SHIPPED | OUTPATIENT
Start: 2022-08-26 | End: 2023-12-11

## 2022-08-26 ASSESSMENT — FIBROSIS 4 INDEX: FIB4 SCORE: 1.14

## 2022-08-26 NOTE — ASSESSMENT & PLAN NOTE
Point-of-care A1c 8.1  Patient frustrated is still elevated.  Discussed improving diet choices by decreasing simple sugars including not using cranberry juice but perhaps cranberry extract pills if necessary.  However she is not having symptoms may not need to continue with that.  Because she got a yeast infection we will hold off on going up on Jardiance, will continue metformin as it is.  Patient would prefer not to be on injectable we will try to do semaglutide start with 3 mg tabs and move our way up.  If not well covered by her insurance we will look at DPP 4 injectable once a week semaglutide or a GLP-1 daily injectable.  Patient is on ACE inhibitor and statin as well as aspirin.  Foot exam is concerning for some neuropathy.  Discussed importance of moisturizing feet.

## 2022-08-26 NOTE — PROGRESS NOTES
Subjective:     CC:  Diagnoses of Type 2 diabetes mellitus with hyperglycemia, without long-term current use of insulin ([facility]), Hypertension associated with diabetes (HCC), Dyslipidemia associated with type 2 diabetes mellitus ([facility]), and Encounter for screening mammogram for malignant neoplasm of breast were pertinent to this visit.    HISTORY OF THE PRESENT ILLNESS: Patient is a 66 y.o. female. This pleasant patient is here today to establish care and discuss diabetes management.      Diabetes:  - goes up an down  - has seen endocrinologist but not anymore, he moved  - was on insulin and A1c came down and since has stopped  - did get yeast infection when she first started Jardiance  - no longer having a burning itch, tells me she is drinking a lot of cranberry juice  - has had weight loss, so started eating more, admits that is always the best choices  - no personal or family history of thyroid disease or cancer  - no personal or family history of pancreatic cancer    Problem   Dyslipidemia Associated With Type 2 Diabetes Mellitus (Hcc)         Recurrent Major Depressive Disorder, in Full Remission ([facility])   Hypertension Associated With Diabetes ([facility])    8/26/2022: 132/72  Lisinopril 40 mg, metoprolol 25 mg twice daily     Systemic Lupus Erythematosus ([facility])    Previously followed by Dr Delvalle, Rheumatologist      Type 2 Diabetes Mellitus With Hyperglycemia, Without Long-Term Current Use of Insulin ([facility])    8/26/2022: A1c 8.1  Metformin 1000 mg twice daily, empagliflozin 10 mg, lisinopril 40 mg, atorvastatin 40 mg, aspirin 81 mg, adding semaglutide         Current Outpatient Medications Ordered in Epic   Medication Sig Dispense Refill    Semaglutide 3 MG Tab Take 3 mg by mouth every day. 30 Tablet 0    mirtazapine (REMERON) 15 MG Tab Take 1 Tablet by mouth at bedtime. 100 Tablet 3    buPROPion (WELLBUTRIN XL) 300 MG XL tablet TAKE 1 TABLET BY MOUTH EVERY DAY IN THE MORNING 60 Tablet 1    metoprolol tartrate (LOPRESSOR)  "25 MG Tab TAKE 1/2 TABLET BY MOUTH 2 TIMES A DAY 90 Tablet 0    metformin (GLUCOPHAGE) 1000 MG tablet Take 1 Tablet by mouth 2 times a day. 200 Tablet 0    Empagliflozin (JARDIANCE) 10 MG Tab Take 10 mg by mouth every day. 100 Tablet 0    lisinopril (PRINIVIL) 40 MG tablet TAKE 1 TABLET BY MOUTH EVERY  Tablet 3    atorvastatin (LIPITOR) 40 MG Tab TAKE 1 TABLET BY MOUTH EVERY  Tablet 3    Cholecalciferol (VITAMIN D3) 2000 UNIT Cap Take 1 Cap by mouth every day. 90 Cap 3    aspirin EC (ECOTRIN) 81 MG Tablet Delayed Response Take 81 mg by mouth every day.       No current [facility]-ordered facility-administered medications on file.       Health Maintenance: A1c and monofilament as well as order for mammogram, counseled on making PAP appointment    ROS:   ROS see HPI      Objective:       Exam: /72 (BP Location: Right arm, Patient Position: Sitting, BP Cuff Size: Adult)   Pulse 70   Resp 16   Ht 1.676 m (5' 6\")   Wt 55.3 kg (122 lb)   SpO2 97%  Body mass index is 19.69 kg/m².    Physical Exam  Vitals reviewed.   Constitutional:       General: She is not in acute distress.     Appearance: Normal appearance.   HENT:      Head: Normocephalic and atraumatic.   Cardiovascular:      Rate and Rhythm: Normal rate and regular rhythm.      Pulses:           Dorsalis pedis pulses are 2+ on the right side and 2+ on the left side.        Posterior tibial pulses are 1+ on the right side and 1+ on the left side.      Heart sounds: Normal heart sounds.   Pulmonary:      Effort: Pulmonary effort is normal.      Breath sounds: Normal breath sounds.   Feet:      Right foot:      Protective Sensation: 4 sites tested.   1 site sensed.     Skin integrity: Callus and dry skin present.      Toenail Condition: Right toenails are normal.      Left foot:      Protective Sensation: 4 sites tested.  3 sites sensed.      Skin integrity: Callus and dry skin present.      Toenail Condition: Left toenails are normal.   Skin:     " Capillary Refill: Capillary refill takes less than 2 seconds.   Neurological:      Mental Status: She is alert. Mental status is at baseline.   Psychiatric:         Mood and Affect: Mood normal.         Behavior: Behavior normal.         Assessment & Plan:   66 y.o. female with the following -    Problem List Items Addressed This Visit       Type 2 diabetes mellitus with hyperglycemia, without long-term current use of insulin (HCC) (Chronic)     Point-of-care A1c 8.1  Patient frustrated is still elevated.  Discussed improving diet choices by decreasing simple sugars including not using cranberry juice but perhaps cranberry extract pills if necessary.  However she is not having symptoms may not need to continue with that.  Because she got a yeast infection we will hold off on going up on Jardiance, will continue metformin as it is.  Patient would prefer not to be on injectable we will try to do semaglutide start with 3 mg tabs and move our way up.  If not well covered by her insurance we will look at DPP 4 injectable once a week semaglutide or a GLP-1 daily injectable.  Patient is on ACE inhibitor and statin as well as aspirin.  Foot exam is concerning for some neuropathy.  Discussed importance of moisturizing feet.         Relevant Medications    Semaglutide 3 MG Tab    Other Relevant Orders    Diabetic Monofilament LE Exam (Completed)    POCT  A1C (Completed)    Hypertension associated with diabetes (HCC) (Chronic)     This is a chronic condition, controlled.  Blood pressure is at goal under 140/90 in the office today.  We will continue the current regimen.           Relevant Medications    Semaglutide 3 MG Tab    Dyslipidemia associated with type 2 diabetes mellitus (HCC) (Chronic)     Lipid panel good control, patient to continue current management we will check at annual lab time.         Relevant Medications    Semaglutide 3 MG Tab     Other Visit Diagnoses       Encounter for screening mammogram for malignant  neoplasm of breast        Relevant Orders    MA-SCREENING MAMMO BILAT W/TOMOSYNTHESIS W/CAD                Return in about 4 weeks (around 9/23/2022).    Please note that this dictation was created using voice recognition software. I have made every reasonable attempt to correct obvious errors, but I expect that there are errors of grammar and possibly content that I did not discover before finalizing the note.

## 2022-08-26 NOTE — ASSESSMENT & PLAN NOTE
Lipid panel good control, patient to continue current management we will check at annual lab time.

## 2022-08-29 DIAGNOSIS — E11.65 TYPE 2 DIABETES MELLITUS WITH HYPERGLYCEMIA, WITHOUT LONG-TERM CURRENT USE OF INSULIN (HCC): ICD-10-CM

## 2022-08-30 NOTE — PROCEDURES
MONTAGE: Standard  STUDY TYPE: Split Night  RECORDING TECHNIQUE:   After the scalp was prepared, gold plated electrodes were applied to the scalp according to the International 10-20 System. EEG (electroencephalogram) was continuously monitored from the O1-M2, O2-M1, C3-M2, C4-M1, F3-M2, and F4-M1. EOGs (electrooculograms) were monitored by electrodes placed at the left and right outer canthi. Chin EMG (electromyogram) was monitored by electrodes placed on the mentalis and sub-mentalis muscles. Nasal and oral airflow were monitored using a triple port thermocouple as well as oronasal pressure transducer. Respiratory effort was measured by inductive plethysmography technology employing abdominal and thoracic belts. Blood oxygen saturation and pulse were monitored by pulse oximetry. Heart rhythm was monitored by surface electrocardiogram. Leg EMG was studied using surface electrodes placed on left and right anterior tibialis. A microphone was used to monitor tracheal sounds and snoring. Body position was monitored and documented by technician observation.   SCORING CRITERIA:   A modification of the AASM manual for scoring of sleep and associated events was used. Obstructive apneas were scored by cessation of airflow for at least 10 seconds with continuing respiratory effort. Central apneas were scored by cessation of airflow for at least 10 seconds with no respiratory effort. Hypopneas were scored by a 30% or more reduction in airflow for at least 10 seconds accompanied by arterial oxygen desaturation of 3% or an arousal. For CMS (Medicare) patients, per AASM rule 1B, hypopneas are scored by 30% with mild reduction in airflow for at least 10 seconds accompanied by arterial saturation decreased at 4%.    DIAGNOSTIC  Study start time was 09:04:10 PM. Diagnostic recording time was 323 minutes with a total sleep time of 169 minutes resulting in a sleep efficiency of 52.24%%. Sleep latency from the start of the study was  66 minutes and the latency from sleep to REM was 212 minutes. In total,55 arousals were scored for an arousal index of 19.5.  Respiratory:  There were a total of 43 apneas consisting of 43 obstructive apneas, 0 mixed apneas, and 0 central apneas. A total of 23 hypopneas were scored. The apnea index was 15.27 per hour and the hypopnea index was 8.17 per hour resulting in an overall AHI of 23.43. AHI during rem was 2.4 and AHI while supine was 77.23.  Oximetry:  There was a mean oxygen saturation of 94.0%. The minimum oxygen saturation during NREM sleep was84.0% and in REM was 85.0%. Time spent during sleep with oxygen saturations <88% was 3.1 minutes.   Cardiac:  The highest heart rate seen while awake was 98 BPM while the highest heart rate during sleep was 80 BPM with an average sleeping heart rate of 66 BPM.  Limb Movements:  There were a total of 162 PLMs during sleep, which resulted in a PLM index of 57.5. There were 13 PLMs associated with arousals which resulted in a PLMS arousal index of 4.6.    TREATMENT:  Treatment recording time was 3h 18.0m (198 minutes) with a total sleep time of 2h 16.5m (136 minutes) resulting in a sleep efficiency of 68.9%. Sleep latency from the start of treatment was 05 minutes and REM latency from sleep onset was 2h 19.5m. The patient had 43 arousals in total for an arousal index of 18.9.  Respiratory:   There were 1 apneas in total consisting of 0 obstructive apneas, 1 central apneas, and 0 mixed apneas for an apnea index of 0.44. The patient had 0 hypopneas in total, which resulted in a hypopnea index of 0.00. The overall AHI was 0.44, with a REM AHI of 0.00, and a supine AHI of 0.57.   Oximetry:  The mean SaO2 during treatment was 94.0%. The minimum oxygen saturation in NREM was90.0 % and in REM was 90.0%. Patient spent 0 minutes of TST with SaO2 <88%.  Cardiac:  The highest heart rate during sleep was 68 BPM with an average sleeping heart rate of 63BPM.  Limb Movements:  There  were a total of 4 PLMS during titration sleep time that resulted in an index of 1.8. There were 3 PLMS associated with arousals. This resulted in a PLM arousal index of 1.3.  Titration:   CPAP was tried from 5 to 10cm H2O.  This was a fully attended sleep study. This test was technically adequate. This patient was titrated on CPAP starting at 5 cm of water pressure. Patient was titrated up to 10 cm of water pressure. Patient did best at between 6-10 cm of water pressure. AHI was 0 which is considered treated obstructive sleep apnea.     Impression:  1.  Moderate obstructive sleep apnea overall AHI 23.43  2.  Respiratory events worse in supine sleep  3.  Oxygenation decreased with recurrent respiratory events minimum oxygen saturation 84%  4.  Responded well to CPAP therapy  5.  No supine REM sleep was seen during study    Recommendations:  I recommend auto CPAP 5-9 cm.  Patient used a medium AirFit F 20 mask during study.      I also recommend 30 day compliance download to assess the efficacy to the recommended pressure, measure leak, apnea hypopnea index and compliance for further outpatient monitoring and management of CPAP therapy. In some cases alternative treatment options may be proven effective in resolving sleep apnea. These options include upper airway surgery, the use of a dental orthotic, weight loss orpositional therapy. Clinical correlation is required. In general patients with sleep apnea are advised to avoid alcohol, sedatives and not to operate a motor vehicle while drowsy.  Untreated sleep apnea increases the risk for cardiovascular and neurovascular disease.

## 2022-09-08 ENCOUNTER — OFFICE VISIT (OUTPATIENT)
Dept: SLEEP MEDICINE | Facility: MEDICAL CENTER | Age: 67
End: 2022-09-08
Payer: MEDICARE

## 2022-09-08 ENCOUNTER — APPOINTMENT (RX ONLY)
Dept: URBAN - METROPOLITAN AREA CLINIC 6 | Facility: CLINIC | Age: 67
Setting detail: DERMATOLOGY
End: 2022-09-08

## 2022-09-08 VITALS
DIASTOLIC BLOOD PRESSURE: 82 MMHG | SYSTOLIC BLOOD PRESSURE: 128 MMHG | HEART RATE: 77 BPM | BODY MASS INDEX: 19.37 KG/M2 | OXYGEN SATURATION: 97 % | WEIGHT: 120.5 LBS | HEIGHT: 66 IN

## 2022-09-08 DIAGNOSIS — Z72.0 TOBACCO USE: ICD-10-CM

## 2022-09-08 DIAGNOSIS — R63.4 UNEXPLAINED WEIGHT LOSS: ICD-10-CM

## 2022-09-08 DIAGNOSIS — Z23 NEED FOR VACCINATION: ICD-10-CM

## 2022-09-08 DIAGNOSIS — L82.1 OTHER SEBORRHEIC KERATOSIS: ICD-10-CM

## 2022-09-08 DIAGNOSIS — L81.4 OTHER MELANIN HYPERPIGMENTATION: ICD-10-CM

## 2022-09-08 DIAGNOSIS — R91.8 LUNG NODULE, MULTIPLE: ICD-10-CM

## 2022-09-08 PROCEDURE — G0009 ADMIN PNEUMOCOCCAL VACCINE: HCPCS | Performed by: INTERNAL MEDICINE

## 2022-09-08 PROCEDURE — ? PRESCRIPTION

## 2022-09-08 PROCEDURE — 90677 PCV20 VACCINE IM: CPT | Performed by: INTERNAL MEDICINE

## 2022-09-08 PROCEDURE — ? COUNSELING

## 2022-09-08 PROCEDURE — 99204 OFFICE O/P NEW MOD 45 MIN: CPT | Mod: 25 | Performed by: INTERNAL MEDICINE

## 2022-09-08 PROCEDURE — 99203 OFFICE O/P NEW LOW 30 MIN: CPT

## 2022-09-08 RX ORDER — FLUOCINOLONE ACETONIDE, HYDROQUINONE, AND TRETINOIN .1; 40; .5 MG/G; MG/G; MG/G
1 CREAM TOPICAL
Qty: 30 | Refills: 3 | Status: ERX | COMMUNITY
Start: 2022-09-08

## 2022-09-08 RX ADMIN — FLUOCINOLONE ACETONIDE, HYDROQUINONE, AND TRETINOIN 1: .1; 40; .5 CREAM TOPICAL at 00:00

## 2022-09-08 ASSESSMENT — LOCATION DETAILED DESCRIPTION DERM
LOCATION DETAILED: LEFT CENTRAL MALAR CHEEK
LOCATION DETAILED: LEFT INFERIOR CENTRAL MALAR CHEEK
LOCATION DETAILED: RIGHT CENTRAL MALAR CHEEK

## 2022-09-08 ASSESSMENT — ENCOUNTER SYMPTOMS
DIZZINESS: 0
CHILLS: 0
STRIDOR: 0
FOCAL WEAKNESS: 0
PSYCHIATRIC NEGATIVE: 1
MYALGIAS: 0
SPUTUM PRODUCTION: 0
COUGH: 0
FEVER: 0
SORE THROAT: 0
SENSORY CHANGE: 0
HEADACHES: 0
ABDOMINAL PAIN: 0
NAUSEA: 0
VOMITING: 0
SINUS PAIN: 0
ORTHOPNEA: 0
EYE PAIN: 0
LOSS OF CONSCIOUSNESS: 0
DIARRHEA: 0
WHEEZING: 0
EYE DISCHARGE: 0
SHORTNESS OF BREATH: 0
WEIGHT LOSS: 1

## 2022-09-08 ASSESSMENT — LOCATION SIMPLE DESCRIPTION DERM
LOCATION SIMPLE: LEFT CHEEK
LOCATION SIMPLE: RIGHT CHEEK

## 2022-09-08 ASSESSMENT — FIBROSIS 4 INDEX: FIB4 SCORE: 1.14

## 2022-09-08 ASSESSMENT — LOCATION ZONE DERM: LOCATION ZONE: FACE

## 2022-09-08 NOTE — ASSESSMENT & PLAN NOTE
She has tried quitting smoking before with chantix, nicotine replacement and wellbutrin with no success.  -- referred to smoking cessation program

## 2022-09-08 NOTE — ASSESSMENT & PLAN NOTE
Wt 146 in 2019 -> 120# today.  Thyroid function has been checked and is reportedly normal.  Denies any diarrhea, in fact reports intermittent constipation.  No nausea no changes in appetite. CT chest/abd/pelvis 4/2022 reassuring  -- f/u PCP for continued investigation  -- pulm nodules not the cause of unexplained weight loss

## 2022-09-08 NOTE — PROGRESS NOTES
Pulmonary Clinic- Initial Consult    Date of Service: 9/8/2022    Referring Physician: Adrianne Perez M.D.    Reason for Consult: Multiple lung nodules, unexplained weight loss    Chief Complaint:   Chief Complaint   Patient presents with    Establish Care     Referred by Dr Perez for Lung Nodule, multiple/unexplained wt loss    Other     CT-Chest 01/14/22     HPI:   Mary Moore is a very pleasant 66 y.o. female active tobacco smoker 1/2 ppd, 20 pack years, who is followed by Adrianne Perez M.D. and is referred to the pulmonary clinic for lung nodules and weight loss.     Seen by PCP in April, reported 30 pound unintentional loss over the last few years.  No changes in appetite/diet.  CT chest/abdomen/pelvis for malignancy screening revealed multiple pulmonary nodules.  Past medical history notable for depression, type 2 diabetes, A1c 8.3%, and recently diagnosed moderate GRACE, AHI 23.    CT chest 4/2022 reviewed, multiple bilateral subcentimeter nodules unchanged compared to CT from 2016. No calcifications or mediastinal adenopathy on my review    No prior PFTs    Wt 146 in 2019 -> 120# today.  Thyroid function has been checked and is reportedly normal.  Denies any diarrhea, in fact reports intermittent constipation.  No nausea no changes in appetite.    Functionally, Mary reports rare cough, no dyspnea.     She has tried quitting smoking before with chantix, nicotine replacement and wellbutrin with no success.    Past Medical History:   Diagnosis Date    Anxiety 06/23/2016    Chest pain 06/23/2016    Chest tightness     Constipation 06/23/2016    Cough     Dyslipidemia 06/23/2016    Epigastric pain 06/23/2016    Essential hypertension, benign 11/14/2016    Facial rash 06/23/2016    Fibromyalgia 06/23/2016    GERD (gastroesophageal reflux disease) 06/23/2016    HTN (hypertension) 06/23/2016    Hypercalcemia 06/23/2016    Hyperlipidemia     Hypertension     bordeline     Insomnia     chronic    Lentigo 06/23/2016     Lupus erythematosus 2016    Multiple pulmonary nodules 2016    Sputum production     Urinary incontinence 2016    Vitamin D deficiency 2016    Wheezing        Past Surgical History:   Procedure Laterality Date    RECOVERY  2016    Procedure: CATH LAB-C W/POSSIBLE-RITU;  Surgeon: Recoveryondiogenes Surgery;  [place] [facility] [place]  Service:     PRIMARY C SECTION  1980, ,        Social History     Socioeconomic History    Marital status:      Spouse name: Not on file    Number of children: Not on file    Years of education: Not on file    Highest education level: Not on file   Occupational History    Not on file   Tobacco Use    Smoking status: Some Days     Packs/day: 0.25     Years: 40.00     Pack years: 10.00     Types: Cigarettes     Last attempt to quit: 2017     Years since quittin.4    Smokeless tobacco: Never    Tobacco comments:     Infrequest ciggarette smoking.    Vaping Use    Vaping Use: Never used   Substance and Sexual Activity    Alcohol use: No     Alcohol/week: 0.0 oz    Drug use: No    Sexual activity: Not Currently     Partners: Male   Other Topics Concern    Not on file   Social History Narrative    Not on file     Social Determinants of Health     Financial Resource Strain: Not on file   Food Insecurity: Not on file   Transportation Needs: Not on file   Physical Activity: Not on file   Stress: Not on file   Social Connections: Not on file   Intimate Partner Violence: Not on file   Housing Stability: Not on file          Family History   Problem Relation Age of Onset    Diabetes Mother     Diabetes Father     Heart Disease Father     Stroke Father     Diabetes Son        Current Outpatient Medications on File Prior to Visit   Medication Sig Dispense Refill    Semaglutide 3 MG Tab Take 3 mg by mouth every day. 30 Tablet 0    mirtazapine (REMERON) 15 MG Tab Take 1 Tablet by mouth at bedtime. 100 Tablet 3    buPROPion  "(WELLBUTRIN XL) 300 MG XL tablet TAKE 1 TABLET BY MOUTH EVERY DAY IN THE MORNING 60 Tablet 1    metoprolol tartrate (LOPRESSOR) 25 MG Tab TAKE 1/2 TABLET BY MOUTH 2 TIMES A DAY 90 Tablet 0    metformin (GLUCOPHAGE) 1000 MG tablet Take 1 Tablet by mouth 2 times a day. 200 Tablet 0    Empagliflozin (JARDIANCE) 10 MG Tab Take 10 mg by mouth every day. 100 Tablet 0    lisinopril (PRINIVIL) 40 MG tablet TAKE 1 TABLET BY MOUTH EVERY  Tablet 3    atorvastatin (LIPITOR) 40 MG Tab TAKE 1 TABLET BY MOUTH EVERY  Tablet 3    Cholecalciferol (VITAMIN D3) 2000 UNIT Cap Take 1 Cap by mouth every day. 90 Cap 3    aspirin EC (ECOTRIN) 81 MG Tablet Delayed Response Take 81 mg by mouth every day.       No current facility-administered medications on file prior to visit.       Allergies: Patient has no known allergies.      ROS:   Review of Systems   Constitutional:  Positive for weight loss. Negative for chills and fever.   HENT:  Negative for congestion, sinus pain and sore throat.    Eyes:  Negative for pain and discharge.   Respiratory:  Negative for cough, sputum production, shortness of breath, wheezing and stridor.    Cardiovascular:  Negative for chest pain, orthopnea and leg swelling.   Gastrointestinal:  Negative for abdominal pain, diarrhea, nausea and vomiting.   Genitourinary:  Negative for dysuria, frequency and urgency.   Musculoskeletal:  Negative for myalgias.   Skin:  Negative for rash.   Neurological:  Negative for dizziness, sensory change, focal weakness, loss of consciousness and headaches.   Psychiatric/Behavioral: Negative.     All other systems reviewed and are negative.    Vitals:  /82 (BP Location: Right arm, Patient Position: Sitting, BP Cuff Size: Adult)   Pulse 77   Ht 1.676 m (5' 6\")   Wt 54.7 kg (120 lb 8 oz)   SpO2 97%     Physical Exam:  Physical Exam  Vitals and nursing note reviewed.   Constitutional:       General: She is not in acute distress.     Appearance: Normal " appearance. She is well-developed. She is not ill-appearing or diaphoretic.      Comments: thin   Eyes:      General: No scleral icterus.        Right eye: No discharge.         Left eye: No discharge.      Conjunctiva/sclera: Conjunctivae normal.      Pupils: Pupils are equal, round, and reactive to light.   Neck:      Thyroid: No thyromegaly.      Vascular: No JVD.   Cardiovascular:      Rate and Rhythm: Normal rate and regular rhythm.      Heart sounds: Normal heart sounds. No murmur heard.    No gallop.   Pulmonary:      Effort: Pulmonary effort is normal. No respiratory distress.      Breath sounds: Normal breath sounds. No wheezing or rales.   Abdominal:      General: There is no distension.      Palpations: Abdomen is soft.      Tenderness: There is no abdominal tenderness. There is no guarding.   Musculoskeletal:         General: No tenderness.   Lymphadenopathy:      Cervical: No cervical adenopathy.   Skin:     General: Skin is warm.      Capillary Refill: Capillary refill takes less than 2 seconds.      Findings: No erythema or rash.   Neurological:      Mental Status: She is alert and oriented to person, place, and time.      Cranial Nerves: No cranial nerve deficit.      Sensory: No sensory deficit.      Motor: No abnormal muscle tone.   Psychiatric:         Behavior: Behavior normal.     Laboratory Data:    PFTs as reviewed by me personally show:  None for review at time of consultation    Imaging as reviewed by me personally show:    CT chest 4/2022 reviewed, multiple bilateral subcentimeter nodules unchanged compared to CT from 2016. No calcifications or mediastinal adenopathy on my review    Assessment/Plan:    Problem List Items Addressed This Visit       Lung nodule, multiple     Stable since 2016  No further workup indicated  Not the cause of unexplained weight loss  Continue annual low dose CT (next due in 4/2023) given smoking history         Tobacco use     She has tried quitting smoking  before with chantix, nicotine replacement and wellbutrin with no success.  -- referred to smoking cessation program         Relevant Orders    Referral to Tobacco Cessation Program    Unexplained weight loss     Wt 146 in 2019 -> 120# today.  Thyroid function has been checked and is reportedly normal.  Denies any diarrhea, in fact reports intermittent constipation.  No nausea no changes in appetite. CT chest/abd/pelvis 4/2022 reassuring  -- f/u PCP for continued investigation  -- pulm nodules not the cause of unexplained weight loss          Other Visit Diagnoses       Need for vaccination        Relevant Orders    Pneumococcal Conjugate Vaccine 20-Valent (19 yrs+)    Referral to Tobacco Cessation Program          Return in about 6 months (around 3/8/2023) for APRN followup for lung cancer screening.     This note was generated using voice recognition software which has a chance of producing errors of grammar and possibly content.  I have made every reasonable attempt to find and correct any obvious errors, but it should be expected that some may not be found prior to finalization of this note.    Time spent in record review prior to patient arrival, reviewing results, and in face-to-face encounter totaled 56 min, excluding any procedures if performed.  __________  Nick Asencio MD  Pulmonary and Critical Care Medicine  [facility]    normal...

## 2022-09-08 NOTE — PROCEDURE: MIPS QUALITY
Quality 226: Preventive Care And Screening: Tobacco Use: Screening And Cessation Intervention: Patient screened for tobacco use and is an ex/non-smoker
Quality 431: Preventive Care And Screening: Unhealthy Alcohol Use - Screening: Patient not identified as an unhealthy alcohol user when screened for unhealthy alcohol use using a systematic screening method
Detail Level: Detailed
No

## 2022-09-08 NOTE — ASSESSMENT & PLAN NOTE
Stable since 2016  No further workup indicated  Not the cause of unexplained weight loss  Continue annual low dose CT (next due in 4/2023) given smoking history

## 2022-09-22 ENCOUNTER — OFFICE VISIT (OUTPATIENT)
Dept: MEDICAL GROUP | Facility: MEDICAL CENTER | Age: 67
End: 2022-09-22
Payer: MEDICARE

## 2022-09-22 ENCOUNTER — HOSPITAL ENCOUNTER (OUTPATIENT)
Facility: MEDICAL CENTER | Age: 67
End: 2022-09-22
Attending: STUDENT IN AN ORGANIZED HEALTH CARE EDUCATION/TRAINING PROGRAM
Payer: MEDICARE

## 2022-09-22 VITALS
OXYGEN SATURATION: 97 % | RESPIRATION RATE: 16 BRPM | HEIGHT: 69 IN | DIASTOLIC BLOOD PRESSURE: 74 MMHG | BODY MASS INDEX: 18.07 KG/M2 | SYSTOLIC BLOOD PRESSURE: 126 MMHG | HEART RATE: 72 BPM | WEIGHT: 122 LBS | TEMPERATURE: 98.2 F

## 2022-09-22 DIAGNOSIS — N81.10 VAGINAL PROLAPSE: ICD-10-CM

## 2022-09-22 DIAGNOSIS — Z78.0 MENOPAUSE: ICD-10-CM

## 2022-09-22 DIAGNOSIS — N89.8 VAGINAL IRRITATION: ICD-10-CM

## 2022-09-22 DIAGNOSIS — Z11.51 SCREENING FOR HPV (HUMAN PAPILLOMAVIRUS): ICD-10-CM

## 2022-09-22 DIAGNOSIS — Z01.419 WELL WOMAN EXAM: ICD-10-CM

## 2022-09-22 DIAGNOSIS — Z12.4 CERVICAL CANCER SCREENING: ICD-10-CM

## 2022-09-22 PROCEDURE — 87510 GARDNER VAG DNA DIR PROBE: CPT

## 2022-09-22 PROCEDURE — 87480 CANDIDA DNA DIR PROBE: CPT

## 2022-09-22 PROCEDURE — 99397 PER PM REEVAL EST PAT 65+ YR: CPT | Performed by: STUDENT IN AN ORGANIZED HEALTH CARE EDUCATION/TRAINING PROGRAM

## 2022-09-22 PROCEDURE — 87660 TRICHOMONAS VAGIN DIR PROBE: CPT

## 2022-09-22 PROCEDURE — 87624 HPV HI-RISK TYP POOLED RSLT: CPT

## 2022-09-22 PROCEDURE — 88175 CYTOPATH C/V AUTO FLUID REDO: CPT

## 2022-09-22 ASSESSMENT — PATIENT HEALTH QUESTIONNAIRE - PHQ9
8. MOVING OR SPEAKING SO SLOWLY THAT OTHER PEOPLE COULD HAVE NOTICED. OR THE OPPOSITE, BEING SO FIGETY OR RESTLESS THAT YOU HAVE BEEN MOVING AROUND A LOT MORE THAN USUAL: NOT AT ALL
1. LITTLE INTEREST OR PLEASURE IN DOING THINGS: NOT AT ALL
SUM OF ALL RESPONSES TO PHQ9 QUESTIONS 1 AND 2: 0
3. TROUBLE FALLING OR STAYING ASLEEP OR SLEEPING TOO MUCH: NOT AT ALL
4. FEELING TIRED OR HAVING LITTLE ENERGY: NOT AT ALL
9. THOUGHTS THAT YOU WOULD BE BETTER OFF DEAD, OR OF HURTING YOURSELF: NOT AT ALL
7. TROUBLE CONCENTRATING ON THINGS, SUCH AS READING THE NEWSPAPER OR WATCHING TELEVISION: NOT AT ALL
6. FEELING BAD ABOUT YOURSELF - OR THAT YOU ARE A FAILURE OR HAVE LET YOURSELF OR YOUR FAMILY DOWN: NOT AL ALL
5. POOR APPETITE OR OVEREATING: NOT AT ALL
SUM OF ALL RESPONSES TO PHQ QUESTIONS 1-9: 0
2. FEELING DOWN, DEPRESSED, IRRITABLE, OR HOPELESS: NOT AT ALL

## 2022-09-22 ASSESSMENT — FIBROSIS 4 INDEX: FIB4 SCORE: 1.14

## 2022-09-22 NOTE — PROGRESS NOTES
Subjective:     CC:   Chief Complaint   Patient presents with    Gynecologic Exam       HPI:   Mary Moore is a 66 y.o. female who presents for annual exam. She is feeling well and denies any complaints.    Ob-Gyn/ History:    Patient has GYN provider: no  /Para:    Last Pap Smear: , no history of abnormal pap smears.  Gyn Surgery: No.  Yes currently sexually active, monogamous with .  Last menstrual period: Age 56. She does not take OTC analgesics for cramps.  No significant bloating/fluid retention, pelvic pain, or dyspareunia. No vaginal discharge  Post-menopausal bleeding: None  Urinary incontinence: Patient notes frequency but no incontinence.      Patient notes that after starting on one of her medications, likely Jardiance she noticed some itching and vaginal discharge.  Patient notes that since then it comes and goes.  Patient notes that she has never sought treatment for this.  We will test vaginal pathogen swab today to further evaluate.    Health Maintenance  Osteoporosis Screen/ DEXA: Patient due for bone density and mammogram.  PT/vit D for falls prevention: Patient taking vitamin D supplement.  Cholesterol Screening: 3/2022 within normal limits  Diabetes Screening: Patient being followed by PCP for diabetes.  Last A1c 8.1%.    Diet: Patient continues to work on diet.  Exercise: Patient notes that she gets exercise by going on walks.  Substance Abuse: No concern about substance abuse.  Safe in relationship.   Seat belts, bike helmet, gun safety discussed.  Sun protection used.    Cancer screening  Cervical Cancer Screening:   Breast Cancer Screening:     Infectious disease screening/Immunizations  --STI Screening  --Practices safe sex.    Review of Systems  Constitutional: Negative for fever, chills and malaise/fatigue.   Respiratory: Negative for cough and shortness of breath.  Cardiovascular: Negative for leg swelling.   Gastrointestinal: Negative for nausea, vomiting,  "abdominal pain and diarrhea.   Genitourinary: Negative for dysuria and hematuria.   Skin: Negative for rash.     Objective:     /74 (BP Location: Left arm, Patient Position: Sitting, BP Cuff Size: Adult)   Pulse 72   Temp 36.8 °C (98.2 °F) (Temporal)   Resp 16   Ht 1.753 m (5' 9\")   Wt 55.3 kg (122 lb)   SpO2 97%   BMI 18.02 kg/m²   Body mass index is 18.02 kg/m².  Wt Readings from Last 4 Encounters:   09/22/22 55.3 kg (122 lb)   09/08/22 54.7 kg (120 lb 8 oz)   08/26/22 55.3 kg (122 lb)   08/03/22 54.4 kg (120 lb)       Physical Exam:  Constitutional: Well-developed and well-nourished. Not diaphoretic. No distress.   Skin: Skin is warm and dry. No rash noted.  Head: Atraumatic without lesions.  Neck: Supple, trachea midline.   Cardiovascular: Regular rate and rhythm, S1 and S2 without murmur, rubs, or gallops.  Lungs: Normal inspiratory effort, CTA bilaterally, no wheezes/rhonchi/rales  Abdomen: Soft, non tender, and without distention  :Perineum and external genitalia normal without rash. Vagina with normal and physiologic discharge. Cervix without visible lesions or discharge. Bimanual exam without adnexal masses or cervical motion tenderness.  Patient with slight vaginal prolapse, cervix visible from external genitalia.        Assessment and Plan:     1. Well woman exam  THINPREP PAP W/HPV       2. Screening for HPV (human papillomavirus)  THINPREP PAP W/HPV       3. Cervical cancer screening  THINPREP PAP W/HPV       4. Vaginal irritation  VAGINAL PATHOGENS DNA PANEL      5. Menopause  DS-BONE DENSITY STUDY (DEXA)      6. Vaginal prolapse            HCM:   Patient advised need for mammogram and bone density scan.  Patient has follow-up with PCP scheduled for next week to further evaluate POCT retinal eye exam and microalbumin.    Labs per orders  Immunizations per orders  Patient counseled about skin care, diet, supplements, prenatal vitamins, safe sex and exercise.      Follow-up: Return if " symptoms worsen or fail to improve.

## 2022-09-23 ENCOUNTER — HOSPITAL ENCOUNTER (OUTPATIENT)
Dept: RADIOLOGY | Facility: MEDICAL CENTER | Age: 67
End: 2022-09-23
Attending: FAMILY MEDICINE
Payer: MEDICARE

## 2022-09-23 DIAGNOSIS — Z12.4 CERVICAL CANCER SCREENING: ICD-10-CM

## 2022-09-23 DIAGNOSIS — Z11.51 SCREENING FOR HPV (HUMAN PAPILLOMAVIRUS): ICD-10-CM

## 2022-09-23 DIAGNOSIS — N89.8 VAGINAL IRRITATION: ICD-10-CM

## 2022-09-23 DIAGNOSIS — Z01.419 WELL WOMAN EXAM: ICD-10-CM

## 2022-09-23 DIAGNOSIS — Z12.31 ENCOUNTER FOR SCREENING MAMMOGRAM FOR MALIGNANT NEOPLASM OF BREAST: ICD-10-CM

## 2022-09-23 LAB
CANDIDA DNA VAG QL PROBE+SIG AMP: NEGATIVE
CYTOLOGY REG CYTOL: NORMAL
G VAGINALIS DNA VAG QL PROBE+SIG AMP: NEGATIVE
HPV HR 12 DNA CVX QL NAA+PROBE: NEGATIVE
HPV16 DNA SPEC QL NAA+PROBE: NEGATIVE
HPV18 DNA SPEC QL NAA+PROBE: NEGATIVE
SPECIMEN SOURCE: NORMAL
T VAGINALIS DNA VAG QL PROBE+SIG AMP: NEGATIVE

## 2022-09-23 PROCEDURE — 77063 BREAST TOMOSYNTHESIS BI: CPT

## 2022-09-27 NOTE — RESULT ENCOUNTER NOTE
Can you call patient and let her know results of Pap smear and vaginal pathogen.  No significant findings, normal results.  Pap smear was negative for intraepithelial lesion or malignancy, negative for HPV.  Vaginal pathogen with no significant findings, no yeast, trichomonas or BV.

## 2022-09-29 ENCOUNTER — OFFICE VISIT (OUTPATIENT)
Dept: MEDICAL GROUP | Facility: MEDICAL CENTER | Age: 67
End: 2022-09-29
Payer: MEDICARE

## 2022-09-29 ENCOUNTER — HOSPITAL ENCOUNTER (OUTPATIENT)
Facility: MEDICAL CENTER | Age: 67
End: 2022-09-29
Attending: FAMILY MEDICINE
Payer: MEDICARE

## 2022-09-29 VITALS
OXYGEN SATURATION: 96 % | WEIGHT: 121.2 LBS | SYSTOLIC BLOOD PRESSURE: 118 MMHG | HEART RATE: 72 BPM | DIASTOLIC BLOOD PRESSURE: 72 MMHG | BODY MASS INDEX: 17.95 KG/M2 | TEMPERATURE: 99.7 F | HEIGHT: 69 IN

## 2022-09-29 DIAGNOSIS — L98.9 SKIN LESION: ICD-10-CM

## 2022-09-29 DIAGNOSIS — E11.65 TYPE 2 DIABETES MELLITUS WITH HYPERGLYCEMIA, WITHOUT LONG-TERM CURRENT USE OF INSULIN (HCC): ICD-10-CM

## 2022-09-29 DIAGNOSIS — Z12.31 ENCOUNTER FOR SCREENING MAMMOGRAM FOR MALIGNANT NEOPLASM OF BREAST: ICD-10-CM

## 2022-09-29 PROCEDURE — 99214 OFFICE O/P EST MOD 30 MIN: CPT | Performed by: FAMILY MEDICINE

## 2022-09-29 PROCEDURE — 82570 ASSAY OF URINE CREATININE: CPT

## 2022-09-29 PROCEDURE — 82043 UR ALBUMIN QUANTITATIVE: CPT

## 2022-09-29 PROCEDURE — 92250 FUNDUS PHOTOGRAPHY W/I&R: CPT | Mod: TC | Performed by: FAMILY MEDICINE

## 2022-09-29 ASSESSMENT — ENCOUNTER SYMPTOMS
POLYDIPSIA: 0
WEIGHT LOSS: 0
ABDOMINAL PAIN: 0
SHORTNESS OF BREATH: 0
BLURRED VISION: 0
TINGLING: 1
FLANK PAIN: 0

## 2022-09-29 ASSESSMENT — FIBROSIS 4 INDEX: FIB4 SCORE: 1.16

## 2022-09-29 NOTE — ASSESSMENT & PLAN NOTE
Tolerating medication well will go up to 7 mg of PO semaglutide and recheck A1C in 2 months when due. Possible signs of neuropathy in hands, discussed trial of gabapentin, patient declines for now, could also consider neurology referral in future.

## 2022-09-29 NOTE — PROGRESS NOTES
"Subjective:     CC: \"diabetes follow up\"    HPI:   Mary presents today with:    Feels like she is tolerating medicine well. No vaginal or urinary symptoms currently. Patient has had negative vaginal pathogens panel with recent pap smear. Has been on Semaglutide 3 mg daily for 1 month and is tolerating it well.    Pap smear showed negative HPV and negative for intraepithelial lesions as well.    Mammogram does not show any obvious masses but noted to have dense breast tissue.    Feeling so-so  Skin lesion, stable for 3-4 months, no bleeding, skin is tender  Saw a dermatologist but is not sure about treatment choice and would like a second opinion.    Problem   Type 2 Diabetes Mellitus With Hyperglycemia, Without Long-Term Current Use of Insulin ([facility])    8/26/2022: A1c 8.1  Metformin 1000 mg twice daily, empagliflozin 10 mg, lisinopril 40 mg, atorvastatin 40 mg, aspirin 81 mg, adding semaglutide         Current Outpatient Medications Ordered in Epic   Medication Sig Dispense Refill    Semaglutide 7 MG Tab Take 7 mg by mouth every day. 90 Tablet 0    mirtazapine (REMERON) 15 MG Tab Take 1 Tablet by mouth at bedtime. 100 Tablet 3    buPROPion (WELLBUTRIN XL) 300 MG XL tablet TAKE 1 TABLET BY MOUTH EVERY DAY IN THE MORNING 60 Tablet 1    metoprolol tartrate (LOPRESSOR) 25 MG Tab TAKE 1/2 TABLET BY MOUTH 2 TIMES A DAY 90 Tablet 0    metformin (GLUCOPHAGE) 1000 MG tablet Take 1 Tablet by mouth 2 times a day. 200 Tablet 0    Empagliflozin (JARDIANCE) 10 MG Tab Take 10 mg by mouth every day. 100 Tablet 0    lisinopril (PRINIVIL) 40 MG tablet TAKE 1 TABLET BY MOUTH EVERY  Tablet 3    atorvastatin (LIPITOR) 40 MG Tab TAKE 1 TABLET BY MOUTH EVERY  Tablet 3    Cholecalciferol (VITAMIN D3) 2000 UNIT Cap Take 1 Cap by mouth every day. 90 Cap 3    aspirin EC (ECOTRIN) 81 MG Tablet Delayed Response Take 81 mg by mouth every day.       No current [facility]-ordered facility-administered medications on file.       Health " "Maintenance: microalbumin, retinal eye exam    ROS:  Review of Systems   Constitutional:  Negative for weight loss.   Eyes:  Negative for blurred vision.   Respiratory:  Negative for shortness of breath.    Cardiovascular:  Negative for chest pain.   Gastrointestinal:  Negative for abdominal pain.   Genitourinary:  Negative for dysuria, flank pain, frequency and urgency.   Musculoskeletal:  Positive for joint pain.   Neurological:  Positive for tingling.   Endo/Heme/Allergies:  Negative for polydipsia.      Objective:     Exam:  /72   Pulse 72   Temp 37.6 °C (99.7 °F) (Temporal)   Ht 1.753 m (5' 9\")   Wt 55 kg (121 lb 3.2 oz)   SpO2 96%   BMI 17.90 kg/m²  Body mass index is 17.9 kg/m².    Physical Exam  Vitals reviewed.   Constitutional:       General: She is not in acute distress.     Appearance: Normal appearance.   HENT:      Head: Normocephalic and atraumatic.   Cardiovascular:      Rate and Rhythm: Normal rate and regular rhythm.      Heart sounds: Normal heart sounds.   Pulmonary:      Effort: Pulmonary effort is normal.      Breath sounds: Normal breath sounds.   Musculoskeletal:      Comments: Negative tinels, phalen's and shake test   Skin:     General: Skin is warm and dry.      Findings: Lesion (raised irregular nevi on left cheek) present.   Neurological:      Mental Status: She is alert. Mental status is at baseline.   Psychiatric:         Mood and Affect: Mood normal.         Behavior: Behavior normal.         Assessment & Plan:     67 y.o. female with the following -     Problem List Items Addressed This Visit       Type 2 diabetes mellitus with hyperglycemia, without long-term current use of insulin (HCC) (Chronic)     Tolerating medication well will go up to 7 mg of PO semaglutide and recheck A1C in 2 months when due. Possible signs of neuropathy in hands, discussed trial of gabapentin, patient declines for now, could also consider neurology referral in future.          Relevant " Medications    Semaglutide 7 MG Tab    Other Relevant Orders    POCT Retinal Eye Exam    MICROALBUMIN CREAT RATIO URINE     Other Visit Diagnoses       Encounter for screening mammogram for malignant neoplasm of breast      Mammogram showed dense breast tissue, discussed options with patient and she would like to move forward with sonocine screening.    Relevant Orders    US-SCREENING WHOLE BREAST BILATERAL (3D SCREENING)    Skin lesion      Stable raised irregular lesion on right cheek, patient would like to see dermatology for this.    Relevant Orders    Referral to Dermatology            Return in about 2 months (around 11/29/2022) for After November 26th, Diabetes F/U.    Please note that this dictation was created using voice recognition software. I have made every reasonable attempt to correct obvious errors, but I expect that there are errors of grammar and possibly content that I did not discover before finalizing the note.

## 2022-09-30 LAB
CREAT UR-MCNC: 69.62 MG/DL
MICROALBUMIN UR-MCNC: <1.2 MG/DL
MICROALBUMIN/CREAT UR: NORMAL MG/G (ref 0–30)

## 2022-10-06 ENCOUNTER — TELEPHONE (OUTPATIENT)
Dept: SLEEP MEDICINE | Facility: MEDICAL CENTER | Age: 67
End: 2022-10-06
Payer: MEDICARE

## 2022-10-06 NOTE — TELEPHONE ENCOUNTER
10-06-22  1st NO SHOW  Date of No Show: 10-5-22  Provider: Dr. Elliott  Reason For Visit: SS Results  Outcome of call:  no answer LVM.  Left call back for scheduling [phone].

## 2022-10-10 DIAGNOSIS — Z79.899 MEDICATION MANAGEMENT: ICD-10-CM

## 2022-10-12 ENCOUNTER — APPOINTMENT (OUTPATIENT)
Dept: SLEEP MEDICINE | Facility: MEDICAL CENTER | Age: 67
End: 2022-10-12
Payer: MEDICARE

## 2022-10-13 DIAGNOSIS — Z12.31 ENCOUNTER FOR SCREENING MAMMOGRAM FOR MALIGNANT NEOPLASM OF BREAST: ICD-10-CM

## 2022-10-13 DIAGNOSIS — R92.30 DENSE BREAST TISSUE ON MAMMOGRAM: ICD-10-CM

## 2022-11-15 DIAGNOSIS — R63.4 UNEXPLAINED WEIGHT LOSS: ICD-10-CM

## 2022-11-15 DIAGNOSIS — F32.1 CURRENT MODERATE EPISODE OF MAJOR DEPRESSIVE DISORDER, UNSPECIFIED WHETHER RECURRENT (HCC): ICD-10-CM

## 2022-11-15 DIAGNOSIS — Z72.0 TOBACCO USE: ICD-10-CM

## 2022-11-15 RX ORDER — BUPROPION HYDROCHLORIDE 300 MG/1
TABLET ORAL
Qty: 90 TABLET | Refills: 1 | Status: SHIPPED | OUTPATIENT
Start: 2022-11-15 | End: 2023-02-27

## 2022-11-22 DIAGNOSIS — I25.10 CORONARY ARTERY DISEASE, NON-OCCLUSIVE: ICD-10-CM

## 2022-11-30 ENCOUNTER — DOCUMENTATION (OUTPATIENT)
Dept: HEALTH INFORMATION MANAGEMENT | Facility: OTHER | Age: 67
End: 2022-11-30
Payer: MEDICARE

## 2022-12-21 DIAGNOSIS — I25.10 CORONARY ARTERY DISEASE, NON-OCCLUSIVE: ICD-10-CM

## 2022-12-24 DIAGNOSIS — I10 ESSENTIAL HYPERTENSION: Chronic | ICD-10-CM

## 2022-12-27 RX ORDER — ATORVASTATIN CALCIUM 40 MG/1
TABLET, FILM COATED ORAL
Qty: 100 TABLET | Refills: 3 | OUTPATIENT
Start: 2022-12-27

## 2022-12-27 NOTE — TELEPHONE ENCOUNTER
Is the patient due for a refill? No    Was the patient seen the past year? Yes    Date of last office visit: 3/11/2022    Does the patient have an upcoming appointment?  Yes   If yes, When? 3/2/2023    Provider to refill:BILLY    Does the patients insurance require a 100 day supply?  Yes

## 2023-02-25 DIAGNOSIS — F32.1 CURRENT MODERATE EPISODE OF MAJOR DEPRESSIVE DISORDER, UNSPECIFIED WHETHER RECURRENT (HCC): ICD-10-CM

## 2023-02-25 DIAGNOSIS — I25.10 CORONARY ARTERY DISEASE, NON-OCCLUSIVE: ICD-10-CM

## 2023-02-25 DIAGNOSIS — R63.4 UNEXPLAINED WEIGHT LOSS: ICD-10-CM

## 2023-02-25 DIAGNOSIS — Z72.0 TOBACCO USE: ICD-10-CM

## 2023-02-27 RX ORDER — BUPROPION HYDROCHLORIDE 300 MG/1
TABLET ORAL
Qty: 90 TABLET | Refills: 1 | Status: SHIPPED | OUTPATIENT
Start: 2023-02-27

## 2023-03-02 ENCOUNTER — OFFICE VISIT (OUTPATIENT)
Dept: CARDIOLOGY | Facility: MEDICAL CENTER | Age: 68
End: 2023-03-02
Payer: MEDICARE

## 2023-03-02 VITALS
SYSTOLIC BLOOD PRESSURE: 138 MMHG | HEART RATE: 68 BPM | RESPIRATION RATE: 14 BRPM | OXYGEN SATURATION: 97 % | HEIGHT: 67 IN | BODY MASS INDEX: 19.78 KG/M2 | WEIGHT: 126 LBS | DIASTOLIC BLOOD PRESSURE: 88 MMHG

## 2023-03-02 DIAGNOSIS — I25.10 CORONARY ARTERY DISEASE, NON-OCCLUSIVE: ICD-10-CM

## 2023-03-02 DIAGNOSIS — E78.5 DYSLIPIDEMIA ASSOCIATED WITH TYPE 2 DIABETES MELLITUS (HCC): Chronic | ICD-10-CM

## 2023-03-02 DIAGNOSIS — E11.69 DYSLIPIDEMIA ASSOCIATED WITH TYPE 2 DIABETES MELLITUS (HCC): Chronic | ICD-10-CM

## 2023-03-02 DIAGNOSIS — E11.59 HYPERTENSION ASSOCIATED WITH DIABETES (HCC): Chronic | ICD-10-CM

## 2023-03-02 DIAGNOSIS — I15.2 HYPERTENSION ASSOCIATED WITH DIABETES (HCC): Chronic | ICD-10-CM

## 2023-03-02 DIAGNOSIS — I10 ESSENTIAL HYPERTENSION: Chronic | ICD-10-CM

## 2023-03-02 PROCEDURE — 99214 OFFICE O/P EST MOD 30 MIN: CPT | Mod: 25 | Performed by: INTERNAL MEDICINE

## 2023-03-02 PROCEDURE — 99406 BEHAV CHNG SMOKING 3-10 MIN: CPT | Performed by: INTERNAL MEDICINE

## 2023-03-02 RX ORDER — LISINOPRIL 40 MG/1
40 TABLET ORAL
Qty: 100 TABLET | Refills: 3 | Status: SHIPPED | OUTPATIENT
Start: 2023-03-02

## 2023-03-02 RX ORDER — ATORVASTATIN CALCIUM 40 MG/1
40 TABLET, FILM COATED ORAL
Qty: 100 TABLET | Refills: 3 | Status: SHIPPED | OUTPATIENT
Start: 2023-03-02

## 2023-03-02 RX ORDER — MONTELUKAST SODIUM 4 MG/1
TABLET, CHEWABLE ORAL
COMMUNITY
Start: 2023-01-09

## 2023-03-02 ASSESSMENT — ENCOUNTER SYMPTOMS
DIZZINESS: 0
LOSS OF CONSCIOUSNESS: 0
MYALGIAS: 0
COUGH: 0
SHORTNESS OF BREATH: 0
PALPITATIONS: 0

## 2023-03-02 ASSESSMENT — FIBROSIS 4 INDEX: FIB4 SCORE: 1.16

## 2023-03-02 NOTE — PROGRESS NOTES
Chief Complaint   Patient presents with    Coronary Artery Disease     F/V Dx: Coronary artery disease, non-occlusive       Subjective     Mary Rodriguez is a 67 y.o. female who presents today for follow-up cardiac care.    The patient has non obstructive CAD, hypertension, hyperlipidemia, DM chronic tobacco use, adult situational stress.    Since 3/11/2022 appointment the patient has had no cardiac symptoms including chest pain, palpitations, shortness of breath.  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  Has gained weight.  Taking a multivitamin.  Continues to smoke quarter of a pack a day.    Past Medical History:   Diagnosis Date    Anxiety 06/23/2016    Chest pain 06/23/2016    Chest tightness     Constipation 06/23/2016    Cough     Dyslipidemia 06/23/2016    Epigastric pain 06/23/2016    Essential hypertension, benign 11/14/2016    Facial rash 06/23/2016    Fibromyalgia 06/23/2016    GERD (gastroesophageal reflux disease) 06/23/2016    HTN (hypertension) 06/23/2016    Hypercalcemia 06/23/2016    Hyperlipidemia     Hypertension     bordeline     Insomnia     chronic    Lentigo 06/23/2016    Lupus erythematosus 06/23/2016    Multiple pulmonary nodules 06/23/2016    Sputum production     Urinary incontinence 06/23/2016    Vitamin D deficiency 06/23/2016    Wheezing      Past Surgical History:   Procedure Laterality Date    RECOVERY  08/25/2016    Procedure: CATH LAB-[facility] W/POSSIBLE-RITU;  Surgeon: Recoveryonly Surgery;  Location: SURGERY PRE-POST PROC UNIT [facility];  Service:     PRIMARY C SECTION  1980 1980, 1984, 1987     Family History   Problem Relation Age of Onset    Diabetes Mother     Diabetes Father     Heart Disease Father     Stroke Father     Diabetes Son      Social History     Socioeconomic History    Marital status:      Spouse name: Not on file    Number of children: Not on file    Years of education: Not on file    Highest education level: Not on file    Occupational History    Not on file   Tobacco Use    Smoking status: Every Day     Packs/day: 0.25     Years: 40.00     Pack years: 10.00     Types: Cigarettes     Last attempt to quit: 2017     Years since quittin.9    Smokeless tobacco: Never    Tobacco comments:      ciggarette smoking.    Vaping Use    Vaping Use: Never used   Substance and Sexual Activity    Alcohol use: No     Alcohol/week: 0.0 oz    Drug use: No    Sexual activity: Not Currently     Partners: Male   Other Topics Concern    Not on file   Social History Narrative    Not on file     Social Determinants of Health     Financial Resource Strain: Not on file   Food Insecurity: Not on file   Transportation Needs: Not on file   Physical Activity: Not on file   Stress: Not on file   Social Connections: Not on file   Intimate Partner Violence: Not on file   Housing Stability: Not on file     No Known Allergies  Outpatient Encounter Medications as of 3/2/2023   Medication Sig Dispense Refill    colestipol (COLESTID) 1 GM Tab TAKE 1 TABLET (1 G TOTAL) BY MOUTH 2 TIMES A DAY.      metoprolol tartrate (LOPRESSOR) 25 MG Tab Take 0.5 Tablets by mouth 2 times a day. 90 Tablet 3    atorvastatin (LIPITOR) 40 MG Tab Take 1 Tablet by mouth every day. 100 Tablet 3    lisinopril (PRINIVIL) 40 MG tablet Take 1 Tablet by mouth every day. 100 Tablet 3    buPROPion (WELLBUTRIN XL) 300 MG XL tablet TAKE 1 TABLET BY MOUTH EVERY DAY IN THE MORNING 90 Tablet 1    metformin (GLUCOPHAGE) 1000 MG tablet TAKE 1 TABLET BY MOUTH TWICE A  Tablet 0    mirtazapine (REMERON) 15 MG Tab Take 1 Tablet by mouth at bedtime. 100 Tablet 3    Empagliflozin (JARDIANCE) 10 MG Tab Take 10 mg by mouth every day. 100 Tablet 0    Cholecalciferol (VITAMIN D3) 2000 UNIT Cap Take 1 Cap by mouth every day. 90 Cap 3    aspirin EC (ECOTRIN) 81 MG Tablet Delayed Response Take 81 mg by mouth every day.      [DISCONTINUED] metoprolol tartrate (LOPRESSOR) 25 MG Tab TAKE 1/2 TABLET BY MOUTH  "TWICE A DAY 90 Tablet 0    Semaglutide 7 MG Tab Take 7 mg by mouth every day. (Patient not taking: Reported on 3/2/2023) 90 Tablet 0    [DISCONTINUED] lisinopril (PRINIVIL) 40 MG tablet TAKE 1 TABLET BY MOUTH EVERY  Tablet 3    [DISCONTINUED] atorvastatin (LIPITOR) 40 MG Tab TAKE 1 TABLET BY MOUTH EVERY  Tablet 3     No facility-administered encounter medications on file as of 3/2/2023.     Review of Systems   Respiratory:  Negative for cough and shortness of breath.    Cardiovascular:  Negative for chest pain and palpitations.   Musculoskeletal:  Negative for myalgias.   Neurological:  Negative for dizziness and loss of consciousness.            Objective     /88 (BP Location: Right arm, Patient Position: Sitting, BP Cuff Size: Adult)   Pulse 68   Resp 14   Ht 1.702 m (5' 7\")   Wt 57.2 kg (126 lb)   SpO2 97%   BMI 19.73 kg/m²     Physical Exam  Vitals reviewed.   Constitutional:       General: She is not in acute distress.     Appearance: She is well-developed.   Neck:      Vascular: No JVD.   Cardiovascular:      Rate and Rhythm: Normal rate and regular rhythm.      Pulses:           Carotid pulses are 2+ on the right side and 2+ on the left side.     Heart sounds: Normal heart sounds. No murmur heard.    No friction rub. No gallop.   Pulmonary:      Effort: Pulmonary effort is normal. No accessory muscle usage or respiratory distress.      Breath sounds: Normal breath sounds. No wheezing or rales.   Abdominal:      General: There is no distension.      Palpations: Abdomen is soft. There is no mass.      Tenderness: There is no abdominal tenderness.   Musculoskeletal:      Cervical back: Normal range of motion and neck supple.      Right lower leg: No edema.      Left lower leg: No edema.   Skin:     General: Skin is warm and dry.      Findings: No rash.      Nails: There is no clubbing.   Neurological:      Mental Status: She is alert and oriented to person, place, and time. "   Psychiatric:         Behavior: Behavior normal.       11/16/2009 ECHOCARDIOGRAM  EF 65%.  No valvular disease.  Pulmonary artery pressure 25-30 mmHg.     11/17/2009 CARDIAC CATHETERIZATION  EF 75%.  Minimal coronary artery disease.  Medical therapy and smoking cessation recommended.     CARDIAC CATHETERIZATION 08/25/2016  A.  Left heart catheterization.  B.  Left ventriculography.  C.  Selective coronary artery.  D.  Right radial artery approach.  1.  EF 64%.  Normal wall motion.  2.  Proximal LAD at 20% ostial.  3.  Ostial D1 50%-60%.     06/13/2017 MPI   No reversible defects that would indicate ischemia.    Normal left ventricular size, ejection fraction, and wall motion.    ECG INTERPRETATION   Negative stress ECG for ischemia.    Assessment & Plan     1. Coronary artery disease, non-occlusive  metoprolol tartrate (LOPRESSOR) 25 MG Tab      2. Hypertension associated with diabetes (HCC)        3. Dyslipidemia associated with type 2 diabetes mellitus (HCC)        4. Essential hypertension  atorvastatin (LIPITOR) 40 MG Tab    lisinopril (PRINIVIL) 40 MG tablet          Medical Decision Making: Today's Assessment/Status/Plan:   Assessment  CAD.  Hypertension.    Hyperlipidemia.   Diabetes mellitus.  Chronic tobacco use.  Insomnia/sleep disorder.  Adult situational stress syndrome.     Recommendations/Discussion:  CAD: Clinically stable, continue aspirin, atorvastatin, metoprolol.  Hypertension: BP acceptable, at goal, continue lisinopril.  Dyslipidemia: Prior LDL 47, at goal, continue atorvastatin.    Diabetes mellitus: Most recent 8.1%, on metformin, empagliflozin, defer to PCP Jarrod Palma, DO  Smoking cessation: Discussed smoking sensation at least 5 minutes had undergone previous smoking cessation classes temporarily stop for a month, has tried Chantix without success currently on Wellbutrin, continue smoking cessation efforts   RTC 1 year, sooner if necessary.

## 2023-03-08 ENCOUNTER — APPOINTMENT (OUTPATIENT)
Dept: SLEEP MEDICINE | Facility: MEDICAL CENTER | Age: 68
End: 2023-03-08
Attending: PHYSICIAN ASSISTANT
Payer: MEDICAID

## 2023-03-15 DIAGNOSIS — Z11.59 NEED FOR HEPATITIS C SCREENING TEST: ICD-10-CM

## 2023-03-15 DIAGNOSIS — E11.65 TYPE 2 DIABETES MELLITUS WITH HYPERGLYCEMIA, WITHOUT LONG-TERM CURRENT USE OF INSULIN (HCC): Chronic | ICD-10-CM

## 2023-03-15 DIAGNOSIS — E55.9 VITAMIN D DEFICIENCY: ICD-10-CM

## 2023-03-15 DIAGNOSIS — E11.69 DYSLIPIDEMIA ASSOCIATED WITH TYPE 2 DIABETES MELLITUS (HCC): Chronic | ICD-10-CM

## 2023-03-15 DIAGNOSIS — I15.2 HYPERTENSION ASSOCIATED WITH DIABETES (HCC): Chronic | ICD-10-CM

## 2023-03-15 DIAGNOSIS — E11.59 HYPERTENSION ASSOCIATED WITH DIABETES (HCC): Chronic | ICD-10-CM

## 2023-03-15 DIAGNOSIS — E53.8 VITAMIN B12 DEFICIENCY: ICD-10-CM

## 2023-03-15 DIAGNOSIS — E78.5 DYSLIPIDEMIA ASSOCIATED WITH TYPE 2 DIABETES MELLITUS (HCC): Chronic | ICD-10-CM

## 2023-03-15 DIAGNOSIS — Z79.899 MEDICATION MANAGEMENT: ICD-10-CM

## 2023-04-05 DIAGNOSIS — Z79.899 MEDICATION MANAGEMENT: ICD-10-CM

## 2023-04-12 ENCOUNTER — TELEPHONE (OUTPATIENT)
Dept: MEDICAL GROUP | Facility: MEDICAL CENTER | Age: 68
End: 2023-04-12
Payer: MEDICARE

## 2023-08-28 DIAGNOSIS — I25.10 CORONARY ARTERY DISEASE, NON-OCCLUSIVE: ICD-10-CM

## 2023-09-11 ENCOUNTER — TELEPHONE (OUTPATIENT)
Dept: CARDIOLOGY | Facility: MEDICAL CENTER | Age: 68
End: 2023-09-11
Payer: MEDICARE

## 2023-09-11 NOTE — TELEPHONE ENCOUNTER
BILLY     Caller: Mary Moore    Topic/issue: Patient is calling to inform office that she will be completing lab work at her primary care office, outside of [facility]. Patient states she will be completing a LIPID profile and would like the cardiology office to know of results. Patient would also like to have a recommendation of cardiologist to follow up with, as her insurance is not accepted by [facility]. Please advise.     Callback Number: [phone] (home)     Thank you,   Lisa VELA

## 2023-09-18 NOTE — TELEPHONE ENCOUNTER
To SW: any recommendations for a cardiologist in the [place] area (outside of [facility])?

## 2023-09-18 NOTE — TELEPHONE ENCOUNTER
Called pt back [phone] and left detailed msg with the 3 cardiologists that SW recommended. Advised pt to call back if she had any further questions.

## 2023-12-11 RX ORDER — MIRTAZAPINE 15 MG/1
15 TABLET, FILM COATED ORAL
Qty: 100 TABLET | Refills: 3 | Status: SHIPPED | OUTPATIENT
Start: 2023-12-11

## 2024-04-03 ENCOUNTER — DOCUMENTATION (OUTPATIENT)
Dept: HEALTH INFORMATION MANAGEMENT | Facility: OTHER | Age: 69
End: 2024-04-03
Payer: MEDICARE

## 2024-10-01 NOTE — CARE PLAN
Problem: Communication  Goal: The ability to communicate needs accurately and effectively will improve  Outcome: PROGRESSING AS EXPECTED  Pt's white board is updated. Pt has been updated on POC. All questions have been answered at this time.    Problem: Knowledge Deficit  Goal: Knowledge of disease process/condition, treatment plan, diagnostic tests, and medications will improve  Outcome: PROGRESSING AS EXPECTED  Discussed POC and medications with patient, pt verbalized understanding.        No